# Patient Record
Sex: FEMALE | Race: ASIAN | NOT HISPANIC OR LATINO | URBAN - METROPOLITAN AREA
[De-identification: names, ages, dates, MRNs, and addresses within clinical notes are randomized per-mention and may not be internally consistent; named-entity substitution may affect disease eponyms.]

---

## 2021-06-26 ENCOUNTER — INPATIENT (INPATIENT)
Facility: HOSPITAL | Age: 51
LOS: 1 days | Discharge: HOME | DRG: 392 | End: 2021-06-28
Attending: HOSPITALIST | Admitting: HOSPITALIST
Payer: MEDICAID

## 2021-06-26 VITALS
HEART RATE: 80 BPM | SYSTOLIC BLOOD PRESSURE: 148 MMHG | WEIGHT: 154.1 LBS | TEMPERATURE: 98 F | RESPIRATION RATE: 16 BRPM | OXYGEN SATURATION: 100 % | DIASTOLIC BLOOD PRESSURE: 88 MMHG

## 2021-06-26 LAB
ALBUMIN SERPL ELPH-MCNC: 3.1 G/DL — LOW (ref 3.5–5)
ALP SERPL-CCNC: 78 U/L — SIGNIFICANT CHANGE UP (ref 40–120)
ALT FLD-CCNC: 20 U/L DA — SIGNIFICANT CHANGE UP (ref 10–60)
ANION GAP SERPL CALC-SCNC: 9 MMOL/L — SIGNIFICANT CHANGE UP (ref 5–17)
ANISOCYTOSIS BLD QL: SLIGHT — SIGNIFICANT CHANGE UP
APPEARANCE UR: CLEAR — SIGNIFICANT CHANGE UP
AST SERPL-CCNC: 12 U/L — SIGNIFICANT CHANGE UP (ref 10–40)
BACTERIA # UR AUTO: ABNORMAL /HPF
BASOPHILS # BLD AUTO: 0.07 K/UL — SIGNIFICANT CHANGE UP (ref 0–0.2)
BASOPHILS NFR BLD AUTO: 0.7 % — SIGNIFICANT CHANGE UP (ref 0–2)
BILIRUB SERPL-MCNC: 0.3 MG/DL — SIGNIFICANT CHANGE UP (ref 0.2–1.2)
BILIRUB UR-MCNC: NEGATIVE — SIGNIFICANT CHANGE UP
BUN SERPL-MCNC: 6 MG/DL — LOW (ref 7–18)
CALCIUM SERPL-MCNC: 8.9 MG/DL — SIGNIFICANT CHANGE UP (ref 8.4–10.5)
CHLORIDE SERPL-SCNC: 107 MMOL/L — SIGNIFICANT CHANGE UP (ref 96–108)
CO2 SERPL-SCNC: 25 MMOL/L — SIGNIFICANT CHANGE UP (ref 22–31)
COLOR SPEC: YELLOW — SIGNIFICANT CHANGE UP
CREAT SERPL-MCNC: 0.68 MG/DL — SIGNIFICANT CHANGE UP (ref 0.5–1.3)
D DIMER BLD IA.RAPID-MCNC: 201 NG/ML DDU — SIGNIFICANT CHANGE UP
DIFF PNL FLD: ABNORMAL
ELLIPTOCYTES BLD QL SMEAR: SLIGHT — SIGNIFICANT CHANGE UP
EOSINOPHIL # BLD AUTO: 0.37 K/UL — SIGNIFICANT CHANGE UP (ref 0–0.5)
EOSINOPHIL NFR BLD AUTO: 3.4 % — SIGNIFICANT CHANGE UP (ref 0–6)
EPI CELLS # UR: SIGNIFICANT CHANGE UP /HPF
GLUCOSE SERPL-MCNC: 106 MG/DL — HIGH (ref 70–99)
GLUCOSE UR QL: NEGATIVE — SIGNIFICANT CHANGE UP
HCT VFR BLD CALC: 30.2 % — LOW (ref 34.5–45)
HCT VFR BLD CALC: 31.6 % — LOW (ref 34.5–45)
HGB BLD-MCNC: 8.8 G/DL — LOW (ref 11.5–15.5)
HGB BLD-MCNC: 9.2 G/DL — LOW (ref 11.5–15.5)
HYPOCHROMIA BLD QL: SLIGHT — SIGNIFICANT CHANGE UP
IMM GRANULOCYTES NFR BLD AUTO: 0.5 % — SIGNIFICANT CHANGE UP (ref 0–1.5)
KETONES UR-MCNC: NEGATIVE — SIGNIFICANT CHANGE UP
LEUKOCYTE ESTERASE UR-ACNC: NEGATIVE — SIGNIFICANT CHANGE UP
LIDOCAIN IGE QN: 123 U/L — SIGNIFICANT CHANGE UP (ref 73–393)
LYMPHOCYTES # BLD AUTO: 28.2 % — SIGNIFICANT CHANGE UP (ref 13–44)
LYMPHOCYTES # BLD AUTO: 3.03 K/UL — SIGNIFICANT CHANGE UP (ref 1–3.3)
MAGNESIUM SERPL-MCNC: 2.5 MG/DL — SIGNIFICANT CHANGE UP (ref 1.6–2.6)
MANUAL SMEAR VERIFICATION: SIGNIFICANT CHANGE UP
MCHC RBC-ENTMCNC: 20.4 PG — LOW (ref 27–34)
MCHC RBC-ENTMCNC: 20.5 PG — LOW (ref 27–34)
MCHC RBC-ENTMCNC: 29.1 GM/DL — LOW (ref 32–36)
MCHC RBC-ENTMCNC: 29.1 GM/DL — LOW (ref 32–36)
MCV RBC AUTO: 70.2 FL — LOW (ref 80–100)
MCV RBC AUTO: 70.2 FL — LOW (ref 80–100)
MICROCYTES BLD QL: SLIGHT — SIGNIFICANT CHANGE UP
MONOCYTES # BLD AUTO: 1.01 K/UL — HIGH (ref 0–0.9)
MONOCYTES NFR BLD AUTO: 9.4 % — SIGNIFICANT CHANGE UP (ref 2–14)
NEUTROPHILS # BLD AUTO: 6.2 K/UL — SIGNIFICANT CHANGE UP (ref 1.8–7.4)
NEUTROPHILS NFR BLD AUTO: 57.8 % — SIGNIFICANT CHANGE UP (ref 43–77)
NITRITE UR-MCNC: NEGATIVE — SIGNIFICANT CHANGE UP
NRBC # BLD: 0 /100 WBCS — SIGNIFICANT CHANGE UP (ref 0–0)
NRBC # BLD: 0 /100 WBCS — SIGNIFICANT CHANGE UP (ref 0–0)
OB PNL STL: POSITIVE
OVALOCYTES BLD QL SMEAR: SLIGHT — SIGNIFICANT CHANGE UP
PH UR: 5 — SIGNIFICANT CHANGE UP (ref 5–8)
PLAT MORPH BLD: NORMAL — SIGNIFICANT CHANGE UP
PLATELET # BLD AUTO: 332 K/UL — SIGNIFICANT CHANGE UP (ref 150–400)
PLATELET # BLD AUTO: 362 K/UL — SIGNIFICANT CHANGE UP (ref 150–400)
POIKILOCYTOSIS BLD QL AUTO: SLIGHT — SIGNIFICANT CHANGE UP
POLYCHROMASIA BLD QL SMEAR: SLIGHT — SIGNIFICANT CHANGE UP
POTASSIUM SERPL-MCNC: 3.8 MMOL/L — SIGNIFICANT CHANGE UP (ref 3.5–5.3)
POTASSIUM SERPL-SCNC: 3.8 MMOL/L — SIGNIFICANT CHANGE UP (ref 3.5–5.3)
PROT SERPL-MCNC: 8.5 G/DL — HIGH (ref 6–8.3)
PROT UR-MCNC: NEGATIVE — SIGNIFICANT CHANGE UP
RBC # BLD: 4.3 M/UL — SIGNIFICANT CHANGE UP (ref 3.8–5.2)
RBC # BLD: 4.5 M/UL — SIGNIFICANT CHANGE UP (ref 3.8–5.2)
RBC # FLD: 17.4 % — HIGH (ref 10.3–14.5)
RBC # FLD: 17.4 % — HIGH (ref 10.3–14.5)
RBC BLD AUTO: ABNORMAL
RBC CASTS # UR COMP ASSIST: ABNORMAL /HPF (ref 0–2)
SODIUM SERPL-SCNC: 141 MMOL/L — SIGNIFICANT CHANGE UP (ref 135–145)
SP GR SPEC: 1.01 — SIGNIFICANT CHANGE UP (ref 1.01–1.02)
TROPONIN I SERPL-MCNC: <0.015 NG/ML — SIGNIFICANT CHANGE UP (ref 0–0.04)
UROBILINOGEN FLD QL: NEGATIVE — SIGNIFICANT CHANGE UP
WBC # BLD: 10.73 K/UL — HIGH (ref 3.8–10.5)
WBC # BLD: 9.67 K/UL — SIGNIFICANT CHANGE UP (ref 3.8–10.5)
WBC # FLD AUTO: 10.73 K/UL — HIGH (ref 3.8–10.5)
WBC # FLD AUTO: 9.67 K/UL — SIGNIFICANT CHANGE UP (ref 3.8–10.5)
WBC UR QL: SIGNIFICANT CHANGE UP /HPF (ref 0–5)

## 2021-06-26 PROCEDURE — 99285 EMERGENCY DEPT VISIT HI MDM: CPT

## 2021-06-26 PROCEDURE — 93010 ELECTROCARDIOGRAM REPORT: CPT

## 2021-06-26 PROCEDURE — 71045 X-RAY EXAM CHEST 1 VIEW: CPT | Mod: 26

## 2021-06-26 RX ORDER — KETOROLAC TROMETHAMINE 30 MG/ML
30 SYRINGE (ML) INJECTION ONCE
Refills: 0 | Status: DISCONTINUED | OUTPATIENT
Start: 2021-06-26 | End: 2021-06-26

## 2021-06-26 RX ORDER — IBUPROFEN 200 MG
1 TABLET ORAL
Qty: 21 | Refills: 0
Start: 2021-06-26 | End: 2021-07-02

## 2021-06-26 RX ORDER — FAMOTIDINE 10 MG/ML
20 INJECTION INTRAVENOUS ONCE
Refills: 0 | Status: COMPLETED | OUTPATIENT
Start: 2021-06-26 | End: 2021-06-26

## 2021-06-26 RX ORDER — FAMOTIDINE 10 MG/ML
1 INJECTION INTRAVENOUS
Qty: 60 | Refills: 0
Start: 2021-06-26 | End: 2021-07-25

## 2021-06-26 RX ORDER — ASPIRIN/CALCIUM CARB/MAGNESIUM 324 MG
162 TABLET ORAL ONCE
Refills: 0 | Status: COMPLETED | OUTPATIENT
Start: 2021-06-26 | End: 2021-06-26

## 2021-06-26 RX ADMIN — FAMOTIDINE 20 MILLIGRAM(S): 10 INJECTION INTRAVENOUS at 19:41

## 2021-06-26 RX ADMIN — Medication 30 MILLIGRAM(S): at 19:41

## 2021-06-26 RX ADMIN — Medication 162 MILLIGRAM(S): at 19:41

## 2021-06-26 RX ADMIN — Medication 30 MILLIGRAM(S): at 20:11

## 2021-06-26 NOTE — ED PROVIDER NOTE - PATIENT PORTAL LINK FT
You can access the FollowMyHealth Patient Portal offered by [facility] by registering at the following website: [email or web address]. By joining [employer]’s FollowMyHealth portal, you will also be able to view your health information using other applications (apps) compatible with our system.

## 2021-06-26 NOTE — ED PROVIDER NOTE - OBJECTIVE STATEMENT
50 y.o. female postmenopausal, c/o constant nonradiating MSCP tightness/burning since yest., pain worse with deep inspiration, dizziness, no sob, fever, coughing, palpitations, diaphoresis.  Pt completed COVID vaccine.  No FH of CAD.  Belching helps relief pain.  Pt had colonoscopy 3 yrs ago-neg. 50 y.o. female postmenopausal, c/o constant nonradiating MSCP tightness/burning since yest., pain worse with deep inspiration, dizziness, no sob, fever, coughing, palpitations, diaphoresis.  Pt completed COVID vaccine.  No FH of CAD.  Belching helps relief pain.  Pt had colonoscopy 3 yrs ago-?colitis, UC, pt doesn't know.

## 2021-06-26 NOTE — ED PROVIDER NOTE - CARE PLAN
Principal Discharge DX:	Atypical chest pain  Secondary Diagnosis:	GERD (gastroesophageal reflux disease)  Secondary Diagnosis:	Anemia   Principal Discharge DX:	Atypical chest pain  Secondary Diagnosis:	GERD (gastroesophageal reflux disease)  Secondary Diagnosis:	Anemia  Secondary Diagnosis:	GIB (gastrointestinal bleeding)   Principal Discharge DX:	GIB (gastrointestinal bleeding)  Secondary Diagnosis:	GERD (gastroesophageal reflux disease)  Secondary Diagnosis:	Anemia  Secondary Diagnosis:	Atypical chest pain

## 2021-06-26 NOTE — ED PROVIDER NOTE - PROGRESS NOTE DETAILS
pt in no distress, feeling much better, will d/c home.  Advised to f/u with PMD Pt with guaiac positive, case explained to pt's daughter, will repeat CBC, if stable, then GI consult next week. Hgb dropped to 8.8 from 9.2 in 4 hours, case d/w Dr. Art, will admit pt for GIB

## 2021-06-27 DIAGNOSIS — K92.2 GASTROINTESTINAL HEMORRHAGE, UNSPECIFIED: ICD-10-CM

## 2021-06-27 DIAGNOSIS — Z29.9 ENCOUNTER FOR PROPHYLACTIC MEASURES, UNSPECIFIED: ICD-10-CM

## 2021-06-27 DIAGNOSIS — J45.909 UNSPECIFIED ASTHMA, UNCOMPLICATED: ICD-10-CM

## 2021-06-27 DIAGNOSIS — R07.9 CHEST PAIN, UNSPECIFIED: ICD-10-CM

## 2021-06-27 LAB
ALBUMIN SERPL ELPH-MCNC: 3 G/DL — LOW (ref 3.5–5)
ALP SERPL-CCNC: 67 U/L — SIGNIFICANT CHANGE UP (ref 40–120)
ALT FLD-CCNC: 19 U/L DA — SIGNIFICANT CHANGE UP (ref 10–60)
ANION GAP SERPL CALC-SCNC: 10 MMOL/L — SIGNIFICANT CHANGE UP (ref 5–17)
AST SERPL-CCNC: 16 U/L — SIGNIFICANT CHANGE UP (ref 10–40)
BASOPHILS # BLD AUTO: 0.07 K/UL — SIGNIFICANT CHANGE UP (ref 0–0.2)
BASOPHILS NFR BLD AUTO: 0.7 % — SIGNIFICANT CHANGE UP (ref 0–2)
BILIRUB SERPL-MCNC: 0.4 MG/DL — SIGNIFICANT CHANGE UP (ref 0.2–1.2)
BUN SERPL-MCNC: 8 MG/DL — SIGNIFICANT CHANGE UP (ref 7–18)
CALCIUM SERPL-MCNC: 9.1 MG/DL — SIGNIFICANT CHANGE UP (ref 8.4–10.5)
CHLORIDE SERPL-SCNC: 108 MMOL/L — SIGNIFICANT CHANGE UP (ref 96–108)
CHOLEST SERPL-MCNC: 232 MG/DL — HIGH
CO2 SERPL-SCNC: 24 MMOL/L — SIGNIFICANT CHANGE UP (ref 22–31)
CREAT SERPL-MCNC: 0.64 MG/DL — SIGNIFICANT CHANGE UP (ref 0.5–1.3)
EOSINOPHIL # BLD AUTO: 0.29 K/UL — SIGNIFICANT CHANGE UP (ref 0–0.5)
EOSINOPHIL NFR BLD AUTO: 2.9 % — SIGNIFICANT CHANGE UP (ref 0–6)
FERRITIN SERPL-MCNC: 18 NG/ML — SIGNIFICANT CHANGE UP (ref 15–150)
FOLATE SERPL-MCNC: >20 NG/ML — SIGNIFICANT CHANGE UP
GLUCOSE SERPL-MCNC: 85 MG/DL — SIGNIFICANT CHANGE UP (ref 70–99)
HCG UR QL: NEGATIVE — SIGNIFICANT CHANGE UP
HCT VFR BLD CALC: 30.8 % — LOW (ref 34.5–45)
HDLC SERPL-MCNC: 85 MG/DL — SIGNIFICANT CHANGE UP
HGB BLD-MCNC: 8.9 G/DL — LOW (ref 11.5–15.5)
IMM GRANULOCYTES NFR BLD AUTO: 0.6 % — SIGNIFICANT CHANGE UP (ref 0–1.5)
IRON SATN MFR SERPL: 20 UG/DL — LOW (ref 40–150)
IRON SATN MFR SERPL: 6 % — LOW (ref 15–50)
LIPID PNL WITH DIRECT LDL SERPL: 137 MG/DL — HIGH
LYMPHOCYTES # BLD AUTO: 2.06 K/UL — SIGNIFICANT CHANGE UP (ref 1–3.3)
LYMPHOCYTES # BLD AUTO: 20.8 % — SIGNIFICANT CHANGE UP (ref 13–44)
MAGNESIUM SERPL-MCNC: 2.4 MG/DL — SIGNIFICANT CHANGE UP (ref 1.6–2.6)
MCHC RBC-ENTMCNC: 20.6 PG — LOW (ref 27–34)
MCHC RBC-ENTMCNC: 28.9 GM/DL — LOW (ref 32–36)
MCV RBC AUTO: 71.3 FL — LOW (ref 80–100)
MONOCYTES # BLD AUTO: 0.71 K/UL — SIGNIFICANT CHANGE UP (ref 0–0.9)
MONOCYTES NFR BLD AUTO: 7.2 % — SIGNIFICANT CHANGE UP (ref 2–14)
NEUTROPHILS # BLD AUTO: 6.73 K/UL — SIGNIFICANT CHANGE UP (ref 1.8–7.4)
NEUTROPHILS NFR BLD AUTO: 67.8 % — SIGNIFICANT CHANGE UP (ref 43–77)
NON HDL CHOLESTEROL: 147 MG/DL — HIGH
NRBC # BLD: 0 /100 WBCS — SIGNIFICANT CHANGE UP (ref 0–0)
PHOSPHATE SERPL-MCNC: 3.8 MG/DL — SIGNIFICANT CHANGE UP (ref 2.5–4.5)
PLATELET # BLD AUTO: 300 K/UL — SIGNIFICANT CHANGE UP (ref 150–400)
POTASSIUM SERPL-MCNC: 3.7 MMOL/L — SIGNIFICANT CHANGE UP (ref 3.5–5.3)
POTASSIUM SERPL-SCNC: 3.7 MMOL/L — SIGNIFICANT CHANGE UP (ref 3.5–5.3)
PROT SERPL-MCNC: 7.6 G/DL — SIGNIFICANT CHANGE UP (ref 6–8.3)
RBC # BLD: 4.32 M/UL — SIGNIFICANT CHANGE UP (ref 3.8–5.2)
RBC # BLD: 4.32 M/UL — SIGNIFICANT CHANGE UP (ref 3.8–5.2)
RBC # FLD: 17.6 % — HIGH (ref 10.3–14.5)
RETICS #: 65.2 K/UL — SIGNIFICANT CHANGE UP (ref 25–125)
RETICS/RBC NFR: 1.5 % — SIGNIFICANT CHANGE UP (ref 0.5–2.5)
SARS-COV-2 RNA SPEC QL NAA+PROBE: SIGNIFICANT CHANGE UP
SODIUM SERPL-SCNC: 142 MMOL/L — SIGNIFICANT CHANGE UP (ref 135–145)
TIBC SERPL-MCNC: 359 UG/DL — SIGNIFICANT CHANGE UP (ref 250–450)
TRIGL SERPL-MCNC: 50 MG/DL — SIGNIFICANT CHANGE UP
TROPONIN I SERPL-MCNC: <0.015 NG/ML — SIGNIFICANT CHANGE UP (ref 0–0.04)
TSH SERPL-MCNC: 1.47 UU/ML — SIGNIFICANT CHANGE UP (ref 0.34–4.82)
UIBC SERPL-MCNC: 339 UG/DL — SIGNIFICANT CHANGE UP (ref 110–370)
VIT B12 SERPL-MCNC: 291 PG/ML — SIGNIFICANT CHANGE UP (ref 232–1245)
WBC # BLD: 9.92 K/UL — SIGNIFICANT CHANGE UP (ref 3.8–10.5)
WBC # FLD AUTO: 9.92 K/UL — SIGNIFICANT CHANGE UP (ref 3.8–10.5)

## 2021-06-27 PROCEDURE — 99223 1ST HOSP IP/OBS HIGH 75: CPT

## 2021-06-27 PROCEDURE — 12345: CPT | Mod: NC

## 2021-06-27 PROCEDURE — 74177 CT ABD & PELVIS W/CONTRAST: CPT | Mod: 26

## 2021-06-27 RX ORDER — SODIUM CHLORIDE 9 MG/ML
1000 INJECTION INTRAMUSCULAR; INTRAVENOUS; SUBCUTANEOUS
Refills: 0 | Status: DISCONTINUED | OUTPATIENT
Start: 2021-06-27 | End: 2021-06-27

## 2021-06-27 RX ORDER — MONTELUKAST 4 MG/1
10 TABLET, CHEWABLE ORAL DAILY
Refills: 0 | Status: DISCONTINUED | OUTPATIENT
Start: 2021-06-27 | End: 2021-06-28

## 2021-06-27 RX ORDER — PANTOPRAZOLE SODIUM 20 MG/1
40 TABLET, DELAYED RELEASE ORAL EVERY 12 HOURS
Refills: 0 | Status: DISCONTINUED | OUTPATIENT
Start: 2021-06-27 | End: 2021-06-27

## 2021-06-27 RX ORDER — PANTOPRAZOLE SODIUM 20 MG/1
40 TABLET, DELAYED RELEASE ORAL
Refills: 0 | Status: DISCONTINUED | OUTPATIENT
Start: 2021-06-27 | End: 2021-06-28

## 2021-06-27 RX ORDER — ALBUTEROL 90 UG/1
2 AEROSOL, METERED ORAL EVERY 6 HOURS
Refills: 0 | Status: DISCONTINUED | OUTPATIENT
Start: 2021-06-27 | End: 2021-06-28

## 2021-06-27 RX ORDER — MONTELUKAST 4 MG/1
1 TABLET, CHEWABLE ORAL
Qty: 0 | Refills: 0 | DISCHARGE

## 2021-06-27 RX ORDER — IOHEXOL 300 MG/ML
30 INJECTION, SOLUTION INTRAVENOUS ONCE
Refills: 0 | Status: COMPLETED | OUTPATIENT
Start: 2021-06-27 | End: 2021-06-27

## 2021-06-27 RX ORDER — FLUTICASONE PROPIONATE AND SALMETEROL 50; 250 UG/1; UG/1
1 POWDER ORAL; RESPIRATORY (INHALATION)
Qty: 0 | Refills: 0 | DISCHARGE

## 2021-06-27 RX ORDER — SODIUM CHLORIDE 9 MG/ML
1000 INJECTION INTRAMUSCULAR; INTRAVENOUS; SUBCUTANEOUS
Refills: 0 | Status: DISCONTINUED | OUTPATIENT
Start: 2021-06-27 | End: 2021-06-28

## 2021-06-27 RX ORDER — ALBUTEROL 90 UG/1
2 AEROSOL, METERED ORAL
Qty: 0 | Refills: 0 | DISCHARGE

## 2021-06-27 RX ADMIN — SODIUM CHLORIDE 80 MILLILITER(S): 9 INJECTION INTRAMUSCULAR; INTRAVENOUS; SUBCUTANEOUS at 01:14

## 2021-06-27 RX ADMIN — MONTELUKAST 10 MILLIGRAM(S): 4 TABLET, CHEWABLE ORAL at 11:52

## 2021-06-27 RX ADMIN — IOHEXOL 30 MILLILITER(S): 300 INJECTION, SOLUTION INTRAVENOUS at 16:24

## 2021-06-27 RX ADMIN — PANTOPRAZOLE SODIUM 40 MILLIGRAM(S): 20 TABLET, DELAYED RELEASE ORAL at 06:06

## 2021-06-27 NOTE — CONSULT NOTE ADULT - NEGATIVE GASTROINTESTINAL SYMPTOMS
no nausea/no vomiting/no diarrhea/no abdominal pain/no melena/no hematochezia/no steatorrhea/no jaundice

## 2021-06-27 NOTE — CONSULT NOTE ADULT - NEGATIVE ENMT SYMPTOMS
no hearing difficulty/no vertigo/no sinus symptoms/no nose bleeds/no gum bleeding/no dry mouth/no throat pain/no dysphagia

## 2021-06-27 NOTE — H&P ADULT - PROBLEM SELECTOR PLAN 2
p/w hb 9.2>8.8  ? H/O colitis   FOBT positive  Vitals stable  Started on protonix 40 iv bid  f/u anemia panel  Monitor CBC daily   GI consult p/w hb 9.2>8.8  FOBT positive  Vitals stable  H/O Ulcerative Colitis diagnosed 15 yrs ago, last colonoscopy 3-4 yrs ago  Started on protonix 40 iv bid  f/u anemia panel  Monitor CBC daily   GI consult p/w hb 9.2>8.8  FOBT positive  Vitals stable  H/O Ulcerative Colitis diagnosed 15 yrs ago, last colonoscopy 3-4 yrs ago  Started on protonix 40 iv bid  f/u anemia panel  Monitor CBC daily   GI

## 2021-06-27 NOTE — CONSULT NOTE ADULT - ASSESSMENT
1. Anemia  2. Positive occult blood in stool  3. Ulcerative colitis  4. R/o peptic ulcer disease  5. R/o colorectal neoplasm    Suggestions:    1. Monitor H/H  2. Transfuse PRBC as needed  3. Protonix 40mg daily  4. Avoid NSAID  5. EGD and colonoscopy out patient  6. Diet as tolerated  7. DVT prophylaxis 1. Anemia  2. Positive occult blood in stool  3. Ulcerative colitis  4. R/o peptic ulcer disease  5. R/o colorectal neoplasm    Suggestions:    1. Monitor H/H  2. Transfuse PRBC as needed  3. Protonix 40mg daily  4. Avoid NSAID  5. EGD and colonoscopy out patient  6. Diet as tolerated  7. CT-Scan of abdomen and pelvis  8. DVT prophylaxis

## 2021-06-27 NOTE — H&P ADULT - ATTENDING COMMENTS
Patient is a 50 year old female with a PMH of ?Colitis who presented with a chief complaint of chest pain.  Patient states that beginning on the day of current presentation she began to experience left upper chest pain, radiating to her mid-chest and down to her upper abdomen, worsened with laying in certain positions and relieved with belching.    Of note, patient states that she has intermittently noticed her stools being blood-streaked.  Patient endorses that she followed-up with GI as an outpatient 3-4 years ago and was told she has "colitis".    At time of examination in the ED, patient denies any headache, dizziness, palpitations, shortness of breath, abdominal pain, nausea/vomiting/diarrhea.    T(C): 36.9 (06-26-21 @ 22:42), Max: 36.9 (06-26-21 @ 22:42)  T(F): 98.5 (06-26-21 @ 22:42), Max: 98.5 (06-26-21 @ 22:42)  HR: 75 (06-26-21 @ 22:42) (75 - 80)  BP: 140/80 (06-26-21 @ 22:42) (140/80 - 148/88)  RR: 18 (06-26-21 @ 22:42) (16 - 18)  SpO2: 99% (06-26-21 @ 22:42) (99% - 100%)  Wt(kg): --    P/E: As above MAR    A/P:    Atypical Chest Pain r/o ACS:  -HEART Score= 0, Low Score, Risk of MACE of 0.9-1.7%.  -FLORENCIO Score= 0 points, 5% risk at 14 days of: all-cause mortality, new or recurrent MI, or severe recurrent ischemia requiring urgent revascularization.  -Will admit to Telemetry for continuous cardiac monitoring  -Will continue to trend troponin with simultaneous acquisition of EKG  -TTE  -In light of possible AGIB, will HOLD Aspirin until patient is evaluated by GI    GI Bleeding:  -FOBT= Positive  -Hgb= 8.8 (Baseline= 9.2 on 6/26/2021)  -Will send Iron Panel (Iron, TIBC, Ferritin) and Retic Count as add-ons to already resulted CBC  -Will continue with IVF hydration  -Will send H.pylori stool antigen testing  -PPI BID (should ideally hold until testing for H.pylori sent)  -Will ask GI to evaluate patient for further recommendations such as possible EGD/Colonoscopy    Microcytic Anemia:  -As above    Uncontrolled Blood Glucose:  -Hemoglobin A1c with AM labs  -Blood Glucose Monitoring ACHS:  -Regular Insulin Sliding Scale ACHS  -NPO (except meds), for now until Speech and Swallow eval    Elevated Total Protein:  -Gamma Gap= 5.4 (Total Protein= 8.5, Albumin= 3.1)  -If clinically warranted, can consider testing for HIV, Blood Cell Dyscrasias, etc.    GI/DVT PPx:  -PPI BID (should ideally hold until testing for H.pylori sent)  -Intermittent Compression Stockings

## 2021-06-27 NOTE — H&P ADULT - ASSESSMENT
50 year old female with a PMH of ?Colitis and Asthma presents to ED with complaint of chest pain.        Pt is admitted for GI Bleed and Chest pain  50 year old female with a PMH of ?Ulcerative Colitis (diagnosed 15 yrs ago, last colonoscopy 3-4 yrs ago) and Asthma presents to ED with complaint of chest pain since 1 day.        Pt is admitted for GI Bleed and Chest pain

## 2021-06-27 NOTE — H&P ADULT - HISTORY OF PRESENT ILLNESS
50 year old female with a PMH of ?Colitis and Asthma presents to ED with complaint of chest pain. Pt reports left sided chest pain that began yesterday, radiates to mid chest, aggravates with laying down and gets relieved with belching. Pt states she has occasionally noticed blood streaked stools. Pt denies any cough, fever, palpitations, shortness of breath, N/V/D, abdominal pain, urinary symptoms or any other acute complaints.  50 year old female with a PMH of ?Ulcerative Colitis (diagnosed 15 yrs ago, last colonoscopy 3-4 yrs ago) and Asthma presents to ED with complaint of chest pain since 1 day. Pt reports left sided burning chest pain that began yesterday, radiates to mid chest, aggravates with laying down on the left side and gets relieved with belching. Pt took aspirin which help relieve the pain temporarily. Pt states she has occasionally noticed blood streaked stools. Pt was diagnosed with ulcerative colitis 15 yrs ago and had 3 colonoscopies with last colonoscopy 3-4 yrs ago.Pt denies any cough, fever, palpitations, shortness of breath, N/V/D, abdominal pain, urinary symptoms or any other acute complaints.

## 2021-06-27 NOTE — CONSULT NOTE ADULT - SUBJECTIVE AND OBJECTIVE BOX
[  ] STAT REQUEST              [  ]ROUTINE REQUEST    Patient is a 50 year old female with anemia and positive occult blood stool. GI consulted to evaluate.        HPI:  50 year old female with past medical history significant for Ulcerative Colitis and Asthma presents to ED with complaint of chest pain since 1 day. Pt reports left sided burning chest pain that began yesterday, radiates to mid chest, aggravates with laying down on the left side and gets relieved with belching. Pt took aspirin which help relieve the pain temporarily. Pt states she has occasionally noticed blood streaked stools. Pt was diagnosed with ulcerative colitis 15 yrs ago and had 3 colonoscopies with last colonoscopy 3-4 yrs ago. Pt denies abdominal pain, nausea, vomiting, hematemesis, melena, fever, chills, palpitations, shortness of breath, hematuria, dysuria or diarrhea.         PAIN MANAGEMENT:  Pain Scale:                0 /10  Pain Location:      Prior Colonoscopy:    PAST MEDICAL HISTORY  Asthma  Ulcerative colitis        PAST SURGICAL HISTORY  No significant past surgical history        Allergies    No Known Allergies    Intolerances  None         MEDICATIONS  (STANDING):  montelukast 10 milliGRAM(s) Oral daily  pantoprazole  Injectable 40 milliGRAM(s) IV Push every 12 hours  sodium chloride 0.9%. 1000 milliLiter(s) (80 mL/Hr) IV Continuous <Continuous>    MEDICATIONS  (PRN):  ALBUTerol    90 MICROgram(s) HFA Inhaler 2 Puff(s) Inhalation every 6 hours PRN Shortness of Breath and/or Wheezing      SOCIAL HISTORY  Advanced Directives:       [ X ] Full Code       [  ] DNR  Marital Status:         [  ] M      [ X ] S      [  ] D       [  ] W  Children:       [X  ] Yes      [  ] No  Occupation:        [  ] Employed       [ X ] Unemployed       [  ] Retired  Diet:       [ X ] Regular       [  ] PEG feeding          [  ] NG tube feeding  Drug Use:           [ X ] Patient denied          [  ] Yes  Alcohol:           [X  ] No             [  ] Yes (socially)         [  ] Yes (chronic)  Tobacco:           [  ] Yes           [ X ] No      FAMILY HISTORY  [ X ] Heart Disease            [ X ] Diabetes             [ X ] HTN             [  ] Colon Cancer             [  ] Stomach Cancer              [  ] Pancreatic Cancer      VITAL SIGNS   Vital Signs Last 24 Hrs  T(C): 36.3 (2021 04:55), Max: 36.9 (2021 22:42)  T(F): 97.4 (2021 04:55), Max: 98.5 (2021 22:42)  HR: 63 (2021 04:55) (61 - 80)  BP: 124/79 (2021 04:55) (124/79 - 148/88)   RR: 18 (2021 04:55) (16 - 18)  SpO2: 98% (2021 04:55) (98% - 100%)  Daily Weight in k.1 (2021 04:55)         CBC Full  -  ( 2021 07:54 )  WBC Count : 9.92 K/uL  RBC Count : 4.32 M/uL  Hemoglobin : 8.9 g/dL  Hematocrit : 30.8 %  Platelet Count - Automated : 300 K/uL  Mean Cell Volume : 71.3 fl  Mean Cell Hemoglobin : 20.6 pg  Mean Cell Hemoglobin Concentration : 28.9 gm/dL  Auto Neutrophil # : 6.73 K/uL  Auto Lymphocyte # : 2.06 K/uL  Auto Monocyte # : 0.71 K/uL  Auto Eosinophil # : 0.29 K/uL  Auto Basophil # : 0.07 K/uL  Auto Neutrophil % : 67.8 %  Auto Lymphocyte % : 20.8 %  Auto Monocyte % : 7.2 %  Auto Eosinophil % : 2.9 %  Auto Basophil % : 0.7 %          142  |  108  |  8   ----------------------------<  85  3.7   |  24  |  0.64    Ca    9.1      2021 07:54  Phos  3.8       Mg     2.4         TPro  7.6  /  Alb  3.0<L>  /  TBili  0.4  /  DBili  x   /  AST  16  /  ALT  19  /  AlkPhos  67      Lipase, Serum: 123 U/L ( @ 19:22)        Occult Blood, Feces (21 @ 21:57)   Occult Blood, Feces: Positive Iron with Total Binding Capacity (21 @ 07:54)   % Saturation, Iron: 6 %   Iron - Total Binding Capacity.: 359 ug/dL   Iron Total, Serum: 20 ug/dL   Unsaturated Iron Binding Capacity: 339 ug/dL     Urinalysis (21 @ 19:54)   Glucose Qualitative, Urine: Negative   Blood, Urine: Trace   pH Urine: 5.0   Color: Yellow   Urine Appearance: Clear   Bilirubin: Negative   Ketone - Urine: Negative   Specific Gravity: 1.010   Protein, Urine: Negative   Urobilinogen: Negative   Nitrite: Negative   Leukocyte Esterase Concentration: Negative     `    RADIOLOGY/IMAGING                EXAM:  XR CHEST PORTABLE URGENT 1V                            PROCEDURE DATE:  2021          INTERPRETATION:  Chest pain.    AP chest.    Heart and mediastinum normal.  Lungs clear.  Costophrenic angles sharp bilaterally.    IMPRESSION: Normal chest

## 2021-06-27 NOTE — H&P ADULT - PROBLEM SELECTOR PLAN 4
RISK                                                          Points  [  ] Previous VTE                                                3  [  ] Thrombophilia                                             2  [  ] Lower limb paralysis                                   2        (unable to hold up >15 seconds)    [  ] Current Cancer                                             2         (within 6 months)  [ x ] Immobilization > 24 hrs                              1  [  ] ICU/CCU stay > 24 hours                             1  [ ] Age > 60                                                         1    IMPROVE VTE Score: 1  will hold chemiprophylaxis due to GI bleed  scd boots

## 2021-06-27 NOTE — CHART NOTE - NSCHARTNOTEFT_GEN_A_CORE
BRIEF NOTE:      Admission H&P from this morning reviewed. Patient admitted for chest pain, which appears GERD like, and lower GI bleed. GI consulted, Dr. Mcwilliams, recommending oral PPI, CT a/p, and outpatient EGD/Colonscopy. CT a/p with IV and PO contrast ordered, will start on clear liquid diet today and advance as tolerated tomorrow. Hold aspirin, monitoring on tele, serial trops, and obtain echo.      Rest as per admission H&P

## 2021-06-27 NOTE — H&P ADULT - PROBLEM SELECTOR PLAN 1
p/w atypical left sided chest pain  Troponin X1 negative  EKG NSR  FLORENCIO Score= 0 points, 5% risk at 14 days of: all-cause mortality, new or recurrent MI, or severe recurrent ischemia requiring urgent revascularization.  Will hold aspirin due to GI bleed  f/u serial troponins p/w atypical left sided chest pain since 1 day that worsens with laying on left sode and improves with belching  Troponin X1 negative  EKG NSR  FLORENCIO Score= 0 points, 5% risk at 14 days of: all-cause mortality, new or recurrent MI, or severe recurrent ischemia requiring urgent revascularization.  Will hold aspirin due to GI bleed  f/u serial troponins

## 2021-06-28 VITALS
DIASTOLIC BLOOD PRESSURE: 70 MMHG | RESPIRATION RATE: 18 BRPM | TEMPERATURE: 98 F | HEART RATE: 77 BPM | OXYGEN SATURATION: 100 % | SYSTOLIC BLOOD PRESSURE: 115 MMHG

## 2021-06-28 LAB
ALBUMIN SERPL ELPH-MCNC: 2.8 G/DL — LOW (ref 3.5–5)
ALP SERPL-CCNC: 68 U/L — SIGNIFICANT CHANGE UP (ref 40–120)
ALT FLD-CCNC: 17 U/L DA — SIGNIFICANT CHANGE UP (ref 10–60)
ANION GAP SERPL CALC-SCNC: 6 MMOL/L — SIGNIFICANT CHANGE UP (ref 5–17)
AST SERPL-CCNC: 12 U/L — SIGNIFICANT CHANGE UP (ref 10–40)
BASOPHILS # BLD AUTO: 0.07 K/UL — SIGNIFICANT CHANGE UP (ref 0–0.2)
BASOPHILS NFR BLD AUTO: 0.9 % — SIGNIFICANT CHANGE UP (ref 0–2)
BILIRUB SERPL-MCNC: 0.4 MG/DL — SIGNIFICANT CHANGE UP (ref 0.2–1.2)
BUN SERPL-MCNC: 6 MG/DL — LOW (ref 7–18)
CALCIUM SERPL-MCNC: 9 MG/DL — SIGNIFICANT CHANGE UP (ref 8.4–10.5)
CHLORIDE SERPL-SCNC: 106 MMOL/L — SIGNIFICANT CHANGE UP (ref 96–108)
CO2 SERPL-SCNC: 27 MMOL/L — SIGNIFICANT CHANGE UP (ref 22–31)
COVID-19 SPIKE DOMAIN AB INTERP: POSITIVE
COVID-19 SPIKE DOMAIN ANTIBODY RESULT: >250 U/ML — HIGH
CREAT SERPL-MCNC: 0.6 MG/DL — SIGNIFICANT CHANGE UP (ref 0.5–1.3)
EOSINOPHIL # BLD AUTO: 0.38 K/UL — SIGNIFICANT CHANGE UP (ref 0–0.5)
EOSINOPHIL NFR BLD AUTO: 4.9 % — SIGNIFICANT CHANGE UP (ref 0–6)
GLUCOSE SERPL-MCNC: 74 MG/DL — SIGNIFICANT CHANGE UP (ref 70–99)
HCT VFR BLD CALC: 31.1 % — LOW (ref 34.5–45)
HGB BLD-MCNC: 9.1 G/DL — LOW (ref 11.5–15.5)
IMM GRANULOCYTES NFR BLD AUTO: 0.4 % — SIGNIFICANT CHANGE UP (ref 0–1.5)
LYMPHOCYTES # BLD AUTO: 2.2 K/UL — SIGNIFICANT CHANGE UP (ref 1–3.3)
LYMPHOCYTES # BLD AUTO: 28.5 % — SIGNIFICANT CHANGE UP (ref 13–44)
MAGNESIUM SERPL-MCNC: 2.3 MG/DL — SIGNIFICANT CHANGE UP (ref 1.6–2.6)
MCHC RBC-ENTMCNC: 20.5 PG — LOW (ref 27–34)
MCHC RBC-ENTMCNC: 29.3 GM/DL — LOW (ref 32–36)
MCV RBC AUTO: 70 FL — LOW (ref 80–100)
MONOCYTES # BLD AUTO: 0.62 K/UL — SIGNIFICANT CHANGE UP (ref 0–0.9)
MONOCYTES NFR BLD AUTO: 8 % — SIGNIFICANT CHANGE UP (ref 2–14)
NEUTROPHILS # BLD AUTO: 4.41 K/UL — SIGNIFICANT CHANGE UP (ref 1.8–7.4)
NEUTROPHILS NFR BLD AUTO: 57.3 % — SIGNIFICANT CHANGE UP (ref 43–77)
NRBC # BLD: 0 /100 WBCS — SIGNIFICANT CHANGE UP (ref 0–0)
PHOSPHATE SERPL-MCNC: 4.3 MG/DL — SIGNIFICANT CHANGE UP (ref 2.5–4.5)
PLATELET # BLD AUTO: 321 K/UL — SIGNIFICANT CHANGE UP (ref 150–400)
POTASSIUM SERPL-MCNC: 4.1 MMOL/L — SIGNIFICANT CHANGE UP (ref 3.5–5.3)
POTASSIUM SERPL-SCNC: 4.1 MMOL/L — SIGNIFICANT CHANGE UP (ref 3.5–5.3)
PROT SERPL-MCNC: 7.7 G/DL — SIGNIFICANT CHANGE UP (ref 6–8.3)
RBC # BLD: 4.44 M/UL — SIGNIFICANT CHANGE UP (ref 3.8–5.2)
RBC # FLD: 17.3 % — HIGH (ref 10.3–14.5)
SARS-COV-2 IGG+IGM SERPL QL IA: >250 U/ML — HIGH
SARS-COV-2 IGG+IGM SERPL QL IA: POSITIVE
SODIUM SERPL-SCNC: 139 MMOL/L — SIGNIFICANT CHANGE UP (ref 135–145)
WBC # BLD: 7.71 K/UL — SIGNIFICANT CHANGE UP (ref 3.8–10.5)
WBC # FLD AUTO: 7.71 K/UL — SIGNIFICANT CHANGE UP (ref 3.8–10.5)

## 2021-06-28 PROCEDURE — 85045 AUTOMATED RETICULOCYTE COUNT: CPT

## 2021-06-28 PROCEDURE — 96374 THER/PROPH/DIAG INJ IV PUSH: CPT

## 2021-06-28 PROCEDURE — 82272 OCCULT BLD FECES 1-3 TESTS: CPT

## 2021-06-28 PROCEDURE — 82728 ASSAY OF FERRITIN: CPT

## 2021-06-28 PROCEDURE — 96375 TX/PRO/DX INJ NEW DRUG ADDON: CPT

## 2021-06-28 PROCEDURE — 99285 EMERGENCY DEPT VISIT HI MDM: CPT

## 2021-06-28 PROCEDURE — 71045 X-RAY EXAM CHEST 1 VIEW: CPT

## 2021-06-28 PROCEDURE — 93306 TTE W/DOPPLER COMPLETE: CPT

## 2021-06-28 PROCEDURE — 87635 SARS-COV-2 COVID-19 AMP PRB: CPT

## 2021-06-28 PROCEDURE — 80061 LIPID PANEL: CPT

## 2021-06-28 PROCEDURE — 99239 HOSP IP/OBS DSCHRG MGMT >30: CPT | Mod: GC

## 2021-06-28 PROCEDURE — 83735 ASSAY OF MAGNESIUM: CPT

## 2021-06-28 PROCEDURE — 82607 VITAMIN B-12: CPT

## 2021-06-28 PROCEDURE — 85379 FIBRIN DEGRADATION QUANT: CPT

## 2021-06-28 PROCEDURE — 84443 ASSAY THYROID STIM HORMONE: CPT

## 2021-06-28 PROCEDURE — 86769 SARS-COV-2 COVID-19 ANTIBODY: CPT

## 2021-06-28 PROCEDURE — 83550 IRON BINDING TEST: CPT

## 2021-06-28 PROCEDURE — 74177 CT ABD & PELVIS W/CONTRAST: CPT

## 2021-06-28 PROCEDURE — 93005 ELECTROCARDIOGRAM TRACING: CPT

## 2021-06-28 PROCEDURE — 84484 ASSAY OF TROPONIN QUANT: CPT

## 2021-06-28 PROCEDURE — 82746 ASSAY OF FOLIC ACID SERUM: CPT

## 2021-06-28 PROCEDURE — 80053 COMPREHEN METABOLIC PANEL: CPT

## 2021-06-28 PROCEDURE — 81001 URINALYSIS AUTO W/SCOPE: CPT

## 2021-06-28 PROCEDURE — 85027 COMPLETE CBC AUTOMATED: CPT

## 2021-06-28 PROCEDURE — 83690 ASSAY OF LIPASE: CPT

## 2021-06-28 PROCEDURE — 84100 ASSAY OF PHOSPHORUS: CPT

## 2021-06-28 PROCEDURE — 36415 COLL VENOUS BLD VENIPUNCTURE: CPT

## 2021-06-28 PROCEDURE — 83540 ASSAY OF IRON: CPT

## 2021-06-28 PROCEDURE — 85025 COMPLETE CBC W/AUTO DIFF WBC: CPT

## 2021-06-28 PROCEDURE — 81025 URINE PREGNANCY TEST: CPT

## 2021-06-28 RX ORDER — ATORVASTATIN CALCIUM 80 MG/1
40 TABLET, FILM COATED ORAL AT BEDTIME
Refills: 0 | Status: DISCONTINUED | OUTPATIENT
Start: 2021-06-28 | End: 2021-06-28

## 2021-06-28 RX ORDER — PANTOPRAZOLE SODIUM 20 MG/1
1 TABLET, DELAYED RELEASE ORAL
Qty: 30 | Refills: 0
Start: 2021-06-28 | End: 2021-07-27

## 2021-06-28 RX ORDER — FERROUS SULFATE 325(65) MG
1 TABLET ORAL
Qty: 60 | Refills: 0
Start: 2021-06-28 | End: 2021-07-27

## 2021-06-28 RX ADMIN — MONTELUKAST 10 MILLIGRAM(S): 4 TABLET, CHEWABLE ORAL at 12:00

## 2021-06-28 RX ADMIN — PANTOPRAZOLE SODIUM 40 MILLIGRAM(S): 20 TABLET, DELAYED RELEASE ORAL at 06:52

## 2021-06-28 NOTE — PROGRESS NOTE ADULT - ASSESSMENT
1. Anemia  2. Positive occult blood in stool  3. Ulcerative colitis  4. R/o peptic ulcer disease  5. R/o colorectal neoplasm    Suggestions:    1. Monitor H/H  2. Transfuse PRBC as needed  3. Protonix 40mg daily  4. Avoid NSAID  5. EGD and colonoscopy out patient  6. Diet as tolerated  7. CT-Scan of abdomen and pelvis  8. DVT prophylaxis 1. Anemia  2. Positive occult blood in stool  3. Ulcerative colitis       Suggestions:    1. Monitor H/H  2. Transfuse PRBC as needed  3. Protonix 40mg daily  4. Antibiotics Cipro and flagyl  5. EGD and colonoscopy out patient  6. Diet as tolerated  7. Resume mesalamine therapy (Asacol HD 800mg TID)  8. DVT prophylaxis

## 2021-06-28 NOTE — DISCHARGE NOTE PROVIDER - NSDCMRMEDTOKEN_GEN_ALL_CORE_FT
ferrous sulfate 324 mg (65 mg elemental iron) oral delayed release tablet: 1 tab(s) orally 2 times a day   fluticasone-salmeterol 55 mcg-14 mcg/inh inhalation powder: 1 puff(s) inhaled 2 times a day  montelukast 10 mg oral tablet: 1 tab(s) orally once a day  pantoprazole 40 mg oral delayed release tablet: 1 tab(s) orally once a day (before a meal)

## 2021-06-28 NOTE — DISCHARGE NOTE PROVIDER - NSDCCPCAREPLAN_GEN_ALL_CORE_FT
PRINCIPAL DISCHARGE DIAGNOSIS  Diagnosis: Chest pain  Assessment and Plan of Treatment: You came in with chest pain. Your hcest pain was not cardiac in nature. You likely have gastroesophageal reflux which is causing your chest pain. You were started on protonix during your hospitalization. You chest pain has not since recurred. You will need to follow up with your PCP and Gastroenterologist after your dishcarge for further evaluation. You will need an ENDOSCOPY (done by a gastroenterologist).      SECONDARY DISCHARGE DIAGNOSES  Diagnosis: Anemia  Assessment and Plan of Treatment: You were found to have microcytic and hypochromic anemia consistent with IRON DEFICIENCY ANEMIA. You are treated with iron supplement (FERROUS SULFATE). Please take 1 tab twice daily. This medication may turn your stool a dark color. This may also cause some constipation. You make take stool softeners if you experience this side effect.    Diagnosis: Uterine fibroid  Assessment and Plan of Treatment: Uterine fibroids were found on your CT scan of the abdomen. Please follow up with an OB/GYN for further management and work up of this uterine fibroid. This may be contributing to your anemia as uterine fibroids may cause heavy menstrual bleeding.    Diagnosis: Hyperlipidemia  Assessment and Plan of Treatment: You were found to have high cholesterol levels. Please Maintain a healthy diet that consist of low sugar, low fat, low sodium. Decrease the amount of fried foods that you consume. Exercise frequently if possible. Please follow up with  your primary care provider within 1 week of your discharge for further management of your hyperlipidemia.   You are recommended a DASH Diet that emphasizes mostly vegetables, fruits, and fat-free or low-fat dairy products. Please limit intake of sodium, sweets, beverages w/ high sugar/carbohydrate content.       PRINCIPAL DISCHARGE DIAGNOSIS  Diagnosis: Chest pain  Assessment and Plan of Treatment: You came in with chest pain. Your hcest pain was not cardiac in nature. You likely have gastroesophageal reflux which is causing your chest pain. You were started on protonix during your hospitalization. You chest pain has not since recurred. You will need to follow up with your PCP and Gastroenterologist after your dishcarge for further evaluation. You will need an ENDOSCOPY (done by a gastroenterologist).      SECONDARY DISCHARGE DIAGNOSES  Diagnosis: Anemia  Assessment and Plan of Treatment: You were found to have microcytic and hypochromic anemia consistent with IRON DEFICIENCY ANEMIA. You are treated with iron supplement (FERROUS SULFATE). Please take 1 tab twice daily. This medication may turn your stool a dark color. This may also cause some constipation. You make take stool softeners if you experience this side effect.    Diagnosis: Uterine fibroid  Assessment and Plan of Treatment: Uterine fibroids were found on your CT scan of the abdomen. Please follow up with an OB/GYN for further management and work up of this uterine fibroid. This may be contributing to your anemia as uterine fibroids may cause heavy menstrual bleeding.    Diagnosis: Hyperlipidemia  Assessment and Plan of Treatment: You were found to have high cholesterol levels. Please Maintain a healthy diet that consist of low sugar, low fat, low sodium. Decrease the amount of fried foods that you consume. Exercise frequently if possible. Please follow up with  your primary care provider within 1 week of your discharge for further management of your hyperlipidemia.   You are recommended a DASH Diet that emphasizes mostly vegetables, fruits, and fat-free or low-fat dairy products. Please limit intake of sodium, sweets, beverages w/ high sugar/carbohydrate content.      Diagnosis: Ulcerative colitis  Assessment and Plan of Treatment: You have ulcerative colitis. You will need to follow up with your gastroenterologist within 2 weeks of your discharge. Please continue to take your ulcerative colitis medications as prescribed.

## 2021-06-28 NOTE — DISCHARGE NOTE PROVIDER - CARE PROVIDER_API CALL
JULIOCESAR FALCON  [id number]  [id number] [place] [address]  Phone: [phone]  Fax: [phone]  Follow Up Time: 1-3 days

## 2021-06-28 NOTE — DISCHARGE NOTE PROVIDER - ATTENDING COMMENTS
Patient seen and examined on day of discharge 6/28. Vitals, labs, and imaging reviewed. On exam, patient comfortable, lungs clears, cardiac wnl, abdomen soft and nontender. Hg is stable. Patient was admitted for epigastric pain, which appears secondary to GERD. Had concerns of GI bleed. GI consulted, Dr. Mcwilliams, recommending oral PPI, CT a/p which showed sigmoid colonic and rectal wall thickening without surrounding inflammation, Dr. Mcwilliams recommend outpatient EGD/Colonscopy. Diet was advanced as tolerated, tolerated well, epigastric pain resolved. Stable for discharge home, will hold aspirin on discharge and have patient f/up with GI as outpatient.    Discharge time spent: 33 minutes

## 2021-06-28 NOTE — DISCHARGE NOTE PROVIDER - HOSPITAL COURSE
50 year old female with a PMH of ?Ulcerative Colitis (diagnosed 15 yrs ago, last colonoscopy 3-4 yrs ago) and Asthma presents to ED with complaint of chest pain since 1 day.Pt is admitted for GI Bleed and Chest pain     Chest pain  Pt p/w atypical left sided chest pain since 1 day that worsens with laying on left sode and improves with belching. Troponin x2 neg. Echo shows normal ef, normal diastolic function. EKG NSR. Pt chest pain resolved after starting protonix. Pt to follow up with GI as outpatient for EGD + Colonoscopy.     Anemia, Iron Deficiency  Pt with Anemia Hb 8-9. Pt reports gross blood streaks. H/O Ulcerative Colitis diagnosed 15 yrs ago, last colonoscopy 3-4 yrs ago. Iron tabs Started on protonix 40 iv bid. Microcytic hypochromic anemia. Anemia panel shows low iron, normal TIBC. GI .    Hyperlipidemia  Elevated cholesterol, ldl. Low ASCVD risk, not started on statin as inpatient. Pt recommended diet changes. Pt to f/u with pcp day after discharge.    Pt diet advanced to regular prior to discharge. Pt able to ambulate independently. Patient is stable for discharge per attending and is advised to follow up with PCP as outpatient  Please refer to patient's complete medical chart with documents for a full hospital course, for this is only a brief summary.

## 2021-06-28 NOTE — DISCHARGE NOTE NURSING/CASE MANAGEMENT/SOCIAL WORK - PATIENT PORTAL LINK FT
You can access the FollowMyHealth Patient Portal offered by [facility] by registering at the following website: [email or web address]. By joining [employer]’s FollowMyHealth portal, you will also be able to view your health information using other applications (apps) compatible with our system.

## 2021-06-28 NOTE — PROGRESS NOTE ADULT - SUBJECTIVE AND OBJECTIVE BOX
[   ] ICU                                          [   ] CCU                                      [ X  ] Medical Floor    Patient is a 50 year old female with anemia and positive occult blood stool. GI consulted to evaluate.         50 year old female with past medical history significant for Ulcerative Colitis and Asthma presents to ED with complaint of chest pain since 1 day. Pt reports left sided burning chest pain that began yesterday, radiates to mid chest, aggravates with laying down on the left side and gets relieved with belching. Pt took aspirin which help relieve the pain temporarily. Pt states she has occasionally noticed blood streaked stools. Pt was diagnosed with ulcerative colitis 15 yrs ago and had 3 colonoscopies with last colonoscopy 3-4 yrs ago. Pt denies abdominal pain, nausea, vomiting, hematemesis, melena, fever, chills, palpitations, shortness of breath, hematuria, dysuria or diarrhea.       Today patient appears comfortable. No new complaints reported, No abdominal pain, N/V, hematemesis, hematochezia, melena, fever, chills, chest pain, SOB, cough or diarrhea reported.      PAIN MANAGEMENT:  Pain Scale:                0 /10  Pain Location:      Prior Colonoscopy:  Unknown    PAST MEDICAL HISTORY  Asthma  Ulcerative colitis        PAST SURGICAL HISTORY  No significant past surgical history        Allergies    No Known Allergies    Intolerances  None         MEDICATIONS  (STANDING):  montelukast 10 milliGRAM(s) Oral daily  pantoprazole  Injectable 40 milliGRAM(s) IV Push every 12 hours  sodium chloride 0.9%. 1000 milliLiter(s) (80 mL/Hr) IV Continuous <Continuous>    MEDICATIONS  (PRN):  ALBUTerol    90 MICROgram(s) HFA Inhaler 2 Puff(s) Inhalation every 6 hours PRN Shortness of Breath and/or Wheezing      SOCIAL HISTORY  Advanced Directives:       [ X ] Full Code       [  ] DNR  Marital Status:         [  ] M      [ X ] S      [  ] D       [  ] W  Children:       [X  ] Yes      [  ] No  Occupation:        [  ] Employed       [ X ] Unemployed       [  ] Retired  Diet:       [ X ] Regular       [  ] PEG feeding          [  ] NG tube feeding  Drug Use:           [ X ] Patient denied          [  ] Yes  Alcohol:           [X  ] No             [  ] Yes (socially)         [  ] Yes (chronic)  Tobacco:           [  ] Yes           [ X ] No      FAMILY HISTORY  [ X ] Heart Disease            [ X ] Diabetes             [ X ] HTN             [  ] Colon Cancer             [  ] Stomach Cancer              [  ] Pancreatic Cancer        VITALS  Vital Signs Last 24 Hrs  T(C): 36.8 (28 Jun 2021 05:12), Max: 36.8 (28 Jun 2021 05:12)  T(F): 98.2 (28 Jun 2021 05:12), Max: 98.2 (28 Jun 2021 05:12)  HR: 68 (28 Jun 2021 05:12) (60 - 68)  BP: 127/76 (28 Jun 2021 05:12) (116/74 - 132/79)   RR: 18 (28 Jun 2021 05:12) (18 - 18)  SpO2: 98% (28 Jun 2021 05:12) (98% - 99%)       MEDICATIONS  (STANDING):  montelukast 10 milliGRAM(s) Oral daily  pantoprazole    Tablet 40 milliGRAM(s) Oral before breakfast  sodium chloride 0.9%. 1000 milliLiter(s) (80 mL/Hr) IV Continuous <Continuous>    MEDICATIONS  (PRN):  ALBUTerol    90 MICROgram(s) HFA Inhaler 2 Puff(s) Inhalation every 6 hours PRN Shortness of Breath and/or Wheezing                            9.1    7.71  )-----------( 321      ( 28 Jun 2021 06:37 )             31.1       06-28    139  |  106  |  6<L>  ----------------------------<  74  4.1   |  27  |  0.60    Ca    9.0      28 Jun 2021 06:37  Phos  4.3     06-28  Mg     2.3     06-28    TPro  7.7  /  Alb  2.8<L>  /  TBili  0.4  /  DBili  x   /  AST  12  /  ALT  17  /  AlkPhos  68  06-28        EXAM:  CT ABDOMEN AND PELVIS OC                             PROCEDURE DATE:  06/27/2021          INTERPRETATION:  CLINICAL INFORMATION: Gastrointestinal hemorrhage. History of ulcerative colitis.    COMPARISON: None.    CONTRAST/COMPLICATIONS:  IV Contrast: Omnipaque 350  90 cc administered   10 cc discarded  Oral Contrast: Omnipaque 300  Complications: None reported at time of study completion    PROCEDURE:  CT of the Abdomen and Pelvis was performed.  Sagittal and coronal reformats were performed.    FINDINGS:  LOWER CHEST: Within normal limits.    LIVER: Within normal limits.  BILE DUCTS: Normal caliber.  GALLBLADDER: Within normal limits.  SPLEEN: Within normal limits.  PANCREAS: Within normal limits.  ADRENALS: Within normal limits.  KIDNEYS/URETERS: Within normal limits.    BLADDER: Within normal limits.  REPRODUCTIVE ORGANS: Enlarged mildly lobulated appearing uterus, which may reflect underlying fibroids.    BOWEL: No bowel obstruction. Oral contrast material reaches the distal transverse colon. Presence of oral contrast material precludes evaluation for intraluminal extravasation of intravenous contrast. Mild sigmoid colonic and rectal wall thickening without significant pericolonic inflammation. Appendix is normal.  PERITONEUM: No ascites.  VESSELS: Within normal limits.  RETROPERITONEUM/LYMPH NODES: No lymphadenopathy.  ABDOMINAL WALL: Within normal limits.  BONES: Sclerotic focus within the left femoral head, may represent a bone island.    IMPRESSION:  Sigmoid colonic and rectal wall thickening without surrounding inflammation. Findings may represent sequelae of patient's known inflammatory bowel disease.

## 2022-03-16 NOTE — ED ADULT NURSE NOTE - DOES PATIENT HAVE ADVANCE DIRECTIVE
Detail Level: Generalized Quality 226: Preventive Care And Screening: Tobacco Use: Screening And Cessation Intervention: Patient screened for tobacco use and is an ex/non-smoker No

## 2024-12-17 ENCOUNTER — INPATIENT (INPATIENT)
Facility: HOSPITAL | Age: 54
LOS: 6 days | Discharge: HOME | DRG: 812 | End: 2024-12-24
Attending: INTERNAL MEDICINE | Admitting: INTERNAL MEDICINE
Payer: COMMERCIAL

## 2024-12-17 VITALS
WEIGHT: 141.76 LBS | SYSTOLIC BLOOD PRESSURE: 122 MMHG | OXYGEN SATURATION: 99 % | DIASTOLIC BLOOD PRESSURE: 75 MMHG | TEMPERATURE: 99 F | HEART RATE: 94 BPM | HEIGHT: 65 IN | RESPIRATION RATE: 18 BRPM

## 2024-12-17 DIAGNOSIS — D64.9 ANEMIA, UNSPECIFIED: ICD-10-CM

## 2024-12-17 DIAGNOSIS — E87.6 HYPOKALEMIA: ICD-10-CM

## 2024-12-17 DIAGNOSIS — J10.1 INFLUENZA DUE TO OTHER IDENTIFIED INFLUENZA VIRUS WITH OTHER RESPIRATORY MANIFESTATIONS: ICD-10-CM

## 2024-12-17 DIAGNOSIS — R18.8 OTHER ASCITES: ICD-10-CM

## 2024-12-17 DIAGNOSIS — J45.909 UNSPECIFIED ASTHMA, UNCOMPLICATED: ICD-10-CM

## 2024-12-17 DIAGNOSIS — K63.9 DISEASE OF INTESTINE, UNSPECIFIED: ICD-10-CM

## 2024-12-17 DIAGNOSIS — N94.89 OTHER SPECIFIED CONDITIONS ASSOCIATED WITH FEMALE GENITAL ORGANS AND MENSTRUAL CYCLE: ICD-10-CM

## 2024-12-17 DIAGNOSIS — D75.838 OTHER THROMBOCYTOSIS: ICD-10-CM

## 2024-12-17 DIAGNOSIS — Z29.9 ENCOUNTER FOR PROPHYLACTIC MEASURES, UNSPECIFIED: ICD-10-CM

## 2024-12-17 DIAGNOSIS — D75.839 THROMBOCYTOSIS, UNSPECIFIED: ICD-10-CM

## 2024-12-17 DIAGNOSIS — K51.90 ULCERATIVE COLITIS, UNSPECIFIED, WITHOUT COMPLICATIONS: ICD-10-CM

## 2024-12-17 LAB
ALBUMIN SERPL ELPH-MCNC: 3.1 G/DL — LOW (ref 3.5–5)
ALP SERPL-CCNC: 66 U/L — SIGNIFICANT CHANGE UP (ref 40–120)
ALT FLD-CCNC: 12 U/L DA — SIGNIFICANT CHANGE UP (ref 10–60)
ANION GAP SERPL CALC-SCNC: 7 MMOL/L — SIGNIFICANT CHANGE UP (ref 5–17)
APTT BLD: 26.6 SEC — SIGNIFICANT CHANGE UP (ref 24.5–35.6)
AST SERPL-CCNC: 6 U/L — LOW (ref 10–40)
BASOPHILS # BLD AUTO: 0.05 K/UL — SIGNIFICANT CHANGE UP (ref 0–0.2)
BASOPHILS NFR BLD AUTO: 0.5 % — SIGNIFICANT CHANGE UP (ref 0–2)
BILIRUB SERPL-MCNC: 0.3 MG/DL — SIGNIFICANT CHANGE UP (ref 0.2–1.2)
BUN SERPL-MCNC: 6 MG/DL — LOW (ref 7–18)
CALCIUM SERPL-MCNC: 8.9 MG/DL — SIGNIFICANT CHANGE UP (ref 8.4–10.5)
CHLORIDE SERPL-SCNC: 104 MMOL/L — SIGNIFICANT CHANGE UP (ref 96–108)
CO2 SERPL-SCNC: 26 MMOL/L — SIGNIFICANT CHANGE UP (ref 22–31)
CREAT SERPL-MCNC: 0.56 MG/DL — SIGNIFICANT CHANGE UP (ref 0.5–1.3)
EGFR: 108 ML/MIN/1.73M2 — SIGNIFICANT CHANGE UP
EOSINOPHIL # BLD AUTO: 0 K/UL — SIGNIFICANT CHANGE UP (ref 0–0.5)
EOSINOPHIL NFR BLD AUTO: 0 % — SIGNIFICANT CHANGE UP (ref 0–6)
FLUAV AG NPH QL: SIGNIFICANT CHANGE UP
FLUBV AG NPH QL: DETECTED
GLUCOSE SERPL-MCNC: 102 MG/DL — HIGH (ref 70–99)
HCT VFR BLD CALC: 19.5 % — CRITICAL LOW (ref 34.5–45)
HGB BLD-MCNC: 5.3 G/DL — CRITICAL LOW (ref 11.5–15.5)
IMM GRANULOCYTES NFR BLD AUTO: 0.8 % — SIGNIFICANT CHANGE UP (ref 0–0.9)
INR BLD: 1.09 RATIO — SIGNIFICANT CHANGE UP (ref 0.85–1.16)
LIDOCAIN IGE QN: 20 U/L — SIGNIFICANT CHANGE UP (ref 13–75)
LYMPHOCYTES # BLD AUTO: 1.54 K/UL — SIGNIFICANT CHANGE UP (ref 1–3.3)
LYMPHOCYTES # BLD AUTO: 14.1 % — SIGNIFICANT CHANGE UP (ref 13–44)
MAGNESIUM SERPL-MCNC: 2 MG/DL — SIGNIFICANT CHANGE UP (ref 1.6–2.6)
MCHC RBC-ENTMCNC: 16.4 PG — LOW (ref 27–34)
MCHC RBC-ENTMCNC: 27.2 G/DL — LOW (ref 32–36)
MCV RBC AUTO: 60.2 FL — LOW (ref 80–100)
MONOCYTES # BLD AUTO: 1.41 K/UL — HIGH (ref 0–0.9)
MONOCYTES NFR BLD AUTO: 12.9 % — SIGNIFICANT CHANGE UP (ref 2–14)
NEUTROPHILS # BLD AUTO: 7.81 K/UL — HIGH (ref 1.8–7.4)
NEUTROPHILS NFR BLD AUTO: 71.7 % — SIGNIFICANT CHANGE UP (ref 43–77)
NRBC # BLD: 0 /100 WBCS — SIGNIFICANT CHANGE UP (ref 0–0)
PLATELET # BLD AUTO: 416 K/UL — HIGH (ref 150–400)
POTASSIUM SERPL-MCNC: 3.2 MMOL/L — LOW (ref 3.5–5.3)
POTASSIUM SERPL-SCNC: 3.2 MMOL/L — LOW (ref 3.5–5.3)
PROT SERPL-MCNC: 7.3 G/DL — SIGNIFICANT CHANGE UP (ref 6–8.3)
PROTHROM AB SERPL-ACNC: 12.6 SEC — SIGNIFICANT CHANGE UP (ref 9.9–13.4)
RBC # BLD: 3.24 M/UL — LOW (ref 3.8–5.2)
RBC # FLD: 17.6 % — HIGH (ref 10.3–14.5)
RSV RNA NPH QL NAA+NON-PROBE: SIGNIFICANT CHANGE UP
SARS-COV-2 RNA SPEC QL NAA+PROBE: SIGNIFICANT CHANGE UP
SODIUM SERPL-SCNC: 137 MMOL/L — SIGNIFICANT CHANGE UP (ref 135–145)
WBC # BLD: 10.9 K/UL — HIGH (ref 3.8–10.5)
WBC # FLD AUTO: 10.9 K/UL — HIGH (ref 3.8–10.5)

## 2024-12-17 PROCEDURE — 99285 EMERGENCY DEPT VISIT HI MDM: CPT

## 2024-12-17 PROCEDURE — 74177 CT ABD & PELVIS W/CONTRAST: CPT | Mod: 26,MC

## 2024-12-17 RX ORDER — IPRATROPIUM BROMIDE AND ALBUTEROL SULFATE .5; 2.5 MG/3ML; MG/3ML
3 SOLUTION RESPIRATORY (INHALATION) EVERY 6 HOURS
Refills: 0 | Status: DISCONTINUED | OUTPATIENT
Start: 2024-12-17 | End: 2024-12-24

## 2024-12-17 RX ORDER — ONDANSETRON 4 MG/1
4 TABLET ORAL ONCE
Refills: 0 | Status: COMPLETED | OUTPATIENT
Start: 2024-12-17 | End: 2024-12-17

## 2024-12-17 RX ORDER — SODIUM CHLORIDE 9 MG/ML
1000 INJECTION, SOLUTION INTRAMUSCULAR; INTRAVENOUS; SUBCUTANEOUS ONCE
Refills: 0 | Status: COMPLETED | OUTPATIENT
Start: 2024-12-17 | End: 2024-12-17

## 2024-12-17 RX ORDER — ONDANSETRON 4 MG/1
4 TABLET ORAL EVERY 8 HOURS
Refills: 0 | Status: DISCONTINUED | OUTPATIENT
Start: 2024-12-17 | End: 2024-12-17

## 2024-12-17 RX ORDER — KETOROLAC TROMETHAMINE 30 MG/ML
15 INJECTION INTRAMUSCULAR; INTRAVENOUS ONCE
Refills: 0 | Status: DISCONTINUED | OUTPATIENT
Start: 2024-12-17 | End: 2024-12-17

## 2024-12-17 RX ORDER — PANTOPRAZOLE 40 MG/1
40 TABLET, DELAYED RELEASE ORAL
Refills: 0 | Status: DISCONTINUED | OUTPATIENT
Start: 2024-12-17 | End: 2024-12-24

## 2024-12-17 RX ORDER — FLUTICASONE FUROATE AND VILANTEROL TRIFENATATE 200; 25 UG/1; UG/1
1 POWDER RESPIRATORY (INHALATION)
Refills: 0 | DISCHARGE

## 2024-12-17 RX ORDER — POTASSIUM CHLORIDE 600 MG/1
40 TABLET, FILM COATED, EXTENDED RELEASE ORAL
Refills: 0 | Status: COMPLETED | OUTPATIENT
Start: 2024-12-17 | End: 2024-12-17

## 2024-12-17 RX ORDER — ACETAMINOPHEN 80 MG/.8ML
650 SOLUTION/ DROPS ORAL EVERY 6 HOURS
Refills: 0 | Status: DISCONTINUED | OUTPATIENT
Start: 2024-12-17 | End: 2024-12-24

## 2024-12-17 RX ORDER — ONDANSETRON 4 MG/1
4 TABLET ORAL EVERY 8 HOURS
Refills: 0 | Status: DISCONTINUED | OUTPATIENT
Start: 2024-12-17 | End: 2024-12-24

## 2024-12-17 RX ORDER — ENOXAPARIN SODIUM 60 MG/.6ML
40 INJECTION INTRAVENOUS; SUBCUTANEOUS EVERY 24 HOURS
Refills: 0 | Status: DISCONTINUED | OUTPATIENT
Start: 2024-12-17 | End: 2024-12-22

## 2024-12-17 RX ORDER — OSELTAMIVIR 75 MG/1
75 CAPSULE ORAL
Refills: 0 | Status: COMPLETED | OUTPATIENT
Start: 2024-12-17 | End: 2024-12-22

## 2024-12-17 RX ADMIN — POTASSIUM CHLORIDE 40 MILLIEQUIVALENT(S): 600 TABLET, FILM COATED, EXTENDED RELEASE ORAL at 18:39

## 2024-12-17 RX ADMIN — KETOROLAC TROMETHAMINE 15 MILLIGRAM(S): 30 INJECTION INTRAMUSCULAR; INTRAVENOUS at 20:58

## 2024-12-17 RX ADMIN — SODIUM CHLORIDE 1000 MILLILITER(S): 9 INJECTION, SOLUTION INTRAMUSCULAR; INTRAVENOUS; SUBCUTANEOUS at 13:22

## 2024-12-17 RX ADMIN — IPRATROPIUM BROMIDE AND ALBUTEROL SULFATE 3 MILLILITER(S): .5; 2.5 SOLUTION RESPIRATORY (INHALATION) at 22:59

## 2024-12-17 RX ADMIN — ONDANSETRON 4 MILLIGRAM(S): 4 TABLET ORAL at 13:22

## 2024-12-17 RX ADMIN — POTASSIUM CHLORIDE 40 MILLIEQUIVALENT(S): 600 TABLET, FILM COATED, EXTENDED RELEASE ORAL at 21:42

## 2024-12-17 RX ADMIN — KETOROLAC TROMETHAMINE 15 MILLIGRAM(S): 30 INJECTION INTRAMUSCULAR; INTRAVENOUS at 13:22

## 2024-12-17 RX ADMIN — ACETAMINOPHEN 650 MILLIGRAM(S): 80 SOLUTION/ DROPS ORAL at 20:02

## 2024-12-17 NOTE — H&P ADULT - PROBLEM SELECTOR PLAN 1
p/w headache, dizziness  likely iso bloody stool x 2 weeks  Hb 5.3 on admission  transfused 1U pRBC in ED  - will transfuse another unit pRBC  - f/u post-transfusion CBC  - f/u iron studies   - maintain active T&S, 2 large IV bore cannula

## 2024-12-17 NOTE — ED PROVIDER NOTE - CARDIAC, MLM
SOCIAL WORK NOTE:/Event Note Normal rate, regular rhythm.  Heart sounds S1, S2.  No murmurs, rubs or gallops.

## 2024-12-17 NOTE — H&P ADULT - NSICDXFAMHXNEG_GEN_ALL
asthma/atrial fibrillation/congestive heart failure/COPD/diabetes/heart disease/hypertension/irritable bowel syndrome/stroke

## 2024-12-17 NOTE — ED PROVIDER NOTE - OBJECTIVE STATEMENT
54-year-old female past medical history of colitis presents ED with abdominal pain, nausea, vomiting, and dizziness.  Son at bedside to give collateral information.  As per patient she has had the vomiting and diarrhea for the last 1 week.  Patient last vomited yesterday and last had diarrhea yesterday.  Patient reports 5-6 episodes of diarrhea yesterday with no blood noted in the vomiting or diarrhea.  Patient went to go see her PMD today and was vies to come to the ER for further evaluation.  Patient saying she is not feeling headache and dizziness associated with her symptoms.  No fever, no cough, no chest pain, no shortness of breath 54-year-old female past medical history of colitis presents ED with abdominal pain, nausea, vomiting, and dizziness.  Son at bedside to give collateral information.  As per patient she has had the vomiting and diarrhea for the last 1 week.  Patient last vomited yesterday and last had diarrhea yesterday.  Patient reports 5-6 episodes of diarrhea yesterday with no blood noted in the vomiting but with blood in stool.  Patient went to go see her PMD today and was vies to come to the ER for further evaluation.  Patient saying she is not feeling headache and dizziness associated with her symptoms.  No fever, no cough, no chest pain, no shortness of breath

## 2024-12-17 NOTE — ED ADULT TRIAGE NOTE - SOURCE OF INFORMATION
We are committed to providing you with the best care possible. In order to help us achieve these goals please remember to bring all medications, herbal products, and over the counter supplements with you to each visit. If your provider has ordered testing for you, please be sure to follow up with our office if you have not received results within 7 days after the testing took place. *If you receive a survey after visiting one of our offices, please take time to share your experience concerning your physician office visit. These surveys are confidential and no health information about you is shared. We are eager to improve for you and we are counting on your feedback to help make that happen.
Patient

## 2024-12-17 NOTE — H&P ADULT - NSHPPHYSICALEXAM_GEN_ALL_CORE
GENERAL: NAD, well-groomed, well-developed, lying in bed comfortably  HEAD:  Atraumatic, normocephalic  EYES: Conjunctiva and sclera clear  ENT: Moist mucous membranes  NECK: Supple, normal appearance  CHEST/LUNG: Clear to auscultation bilaterally; no rales, rhonchi, wheezing, or rubs. No respiratory distress, no use of accessory muscles  HEART: Regular rate and rhythm; no murmurs, rubs, or gallops  ABDOMEN: Soft, nontender, nondistended; no rebound, no guarding, no palpable masses; bowel sounds present  GENITOURINARY: No suprapubic tenderness  EXTREMITIES: 2+ peripheral pulses, brisk capillary refill; no clubbing, cyanosis, or edema  NERVOUS SYSTEM:  Alert & Oriented X3, speech clear, no deficits  MSK: FROM all 4 extremities, full and equal 5/5 strength  LYMPH: No lymphadenopathy noted  SKIN: No rashes or lesions    Vital Signs Last 24 Hrs  T(C): 36.7 (17 Dec 2024 18:05), Max: 37.2 (17 Dec 2024 16:29)  T(F): 98 (17 Dec 2024 18:05), Max: 99 (17 Dec 2024 16:29)  HR: 73 (17 Dec 2024 18:05) (73 - 94)  BP: 127/68 (17 Dec 2024 18:05) (119/71 - 127/68)  BP(mean): --  RR: 18 (17 Dec 2024 16:29) (18 - 18)  SpO2: 97% (17 Dec 2024 16:29) (97% - 99%)    Parameters below as of 17 Dec 2024 11:45  Patient On (Oxygen Delivery Method): room air

## 2024-12-17 NOTE — H&P ADULT - NSHPREVIEWOFSYSTEMS_GEN_ALL_CORE
REVIEW OF SYSTEMS:  CONSTITUTIONAL: Weakness. Denies fevers or chills  HEENT: Headache, dizziness. Denies visual changes, vertigo, throat pain   RESPIRATORY: Denies cough, wheezing, denies shortness of breath  CARDIOVASCULAR: Denies chest pain or palpitations  GASTROINTESTINAL: Lower abdominal pain, nausea, vomiting, diarrhea, bloody stool. Denies constipation.  GENITOURINARY: Denies dysuria, frequency or hematuria  NEUROLOGICAL: Denies numbness or weakness  SKIN: Denies itching, rashes

## 2024-12-17 NOTE — H&P ADULT - PROBLEM SELECTOR PLAN 2
p/w lower abdominal pain x 2 weeks, nausea and vomiting  CTAP: 7.0 x 2.6 x 3.2 cm cystic R adnexal lesion, possibly ovarian or tubular malignancy  ascites and omental carcinomatosis consistent with metastatic disease  - consult heme/onc  - c/w zofran prn

## 2024-12-17 NOTE — H&P ADULT - PROBLEM SELECTOR PLAN 3
hx of UC, diagnosed in 2006  colonoscopy in 2022, unremarkable  bloody stool x 2 weeks  CTAP: irregular thickening of sigmoid colon also concerning for malignancy  - consult GI

## 2024-12-17 NOTE — ED PROVIDER NOTE - PROGRESS NOTE DETAILS
Hemoglobin of 5.3 noted.  Patient does report seeing blood in her stool.  Patient consented for transfusion and packed red cells ordered.  Patient also found to be flu positive.  CT scan with Ascites and omental carcinomatosis consistent with metastatic disease.  All results reviewed with patient.  Patient to be admitted

## 2024-12-17 NOTE — PATIENT PROFILE ADULT - FALL HARM RISK - RISK INTERVENTIONS

## 2024-12-17 NOTE — ED PROVIDER NOTE - CLINICAL SUMMARY MEDICAL DECISION MAKING FREE TEXT BOX
54-year-old female history of colitis presents ED with abdominal pain associated nausea and vomiting.  Nausea patient with headache and dizziness likely related to dehydration.  Afebrile here in ED tender to lower abdomen.  Concern for possible colitis for diverticulitis.  Will get labs, CAT scan, fluids, UA, reassess

## 2024-12-17 NOTE — PATIENT PROFILE ADULT - NSPROEXTENSIONSOFSELF_GEN_A_NUR
Fall Risk Factors  Recent falls and Weakness    Fall Prevention Strategies   Bed in lowest position, Call light within reach, Non-slip footwear, Risk for fall identifier placed and Side rails up x 2    [facility] Fall Risk Score  11 (11/28/17 2057)   none

## 2024-12-17 NOTE — H&P ADULT - ASSESSMENT
54 F, from home, PMHx asthma and UC [diagnosed in 2006] presented to the ED for abdominal pain x 2 weeks, headache, nausea, and vomiting for 1 week and dizziness for 1 day. Hb 5.3 Admitted to medicine for symptomatic anemia and malignancy work-up.     Ascites and omental carcinomatosis consistent with metastatic disease.    7.0 x 2.6 x 3.2 cm cystic right adnexal lesion, possibly ovarian or   tubular malignancy. Recommend pelvic MRI.    Irregular thickening of the sigmoid colon also concerning for malignancy.   Recommend colonoscopy. 54 F, from home, PMHx asthma and UC [diagnosed in 2006] presented to the ED for abdominal pain x 2 weeks, headache, nausea, and vomiting for 1 week and dizziness for 1 day. Hb 5.3 Admitted to medicine for symptomatic anemia and malignancy work-up.      54 F, from home, PMHx asthma and UC [diagnosed in 2006] presented to the ED for abdominal pain x 2 weeks, headache, nausea, and vomiting for 1 week and dizziness for 1 day. Hb 5.3 Admitted to medicine for symptomatic anemia and malignancy work-up.

## 2024-12-17 NOTE — H&P ADULT - HISTORY OF PRESENT ILLNESS
54 F, from home, PMHx asthma and UC [diagnosed in 2006] presented to the ED for abdominal pain x 2 weeks, headache, nausea, and vomiting for 1 week and dizziness for 1 day. Endorses non-radiating, intermittent sharp lower abdominal pain. Also complained on a generalized headache, and nausea and nonbloody, nonbilious vomiting with decreased PO intake. Patient noted to have bloody stool for past 2 weeks and diarrhea that started yesterday. Patient states she feels very weak. Most recent colonoscopy was in 2022, which was unremarkable. Patient saw PCP Dr. Jenae Wiggins recently who recommended a repeat colonoscopy which was scheduled for next month. Denies fever, chills, CP. SOB, palpitations, constipation or urinary Sx.

## 2024-12-17 NOTE — H&P ADULT - ATTENDING COMMENTS
54 F, from home, PMHx asthma and UC [diagnosed in 2006] presented to the ED for abdominal pain x 2 weeks, headache, nausea, and vomiting for 1 week and dizziness for 1 day. Endorses non-radiating, intermittent sharp lower abdominal pain. Also complained on a generalized headache, and nausea and nonbloody, nonbilious vomiting with decreased PO intake. Patient noted to have bloody stool for past 2 weeks and diarrhea that started yesterday. Patient states she feels very weak. Most recent colonoscopy was in 2022, which was unremarkable. Patient saw PCP Dr. Jenae Wiggins recently who recommended a repeat colonoscopy which was scheduled for next month. Denies fever, chills, CP. SOB, palpitations, constipation or urinary Sx.         < from: CT Abdomen and Pelvis w/ IV Cont (12.17.24 @ 15:14) >    IMPRESSION:  Ascites and omental carcinomatosis consistent with metastatic disease.    7.0 x 2.6 x 3.2 cm cystic right adnexal lesion, possibly ovarian or   tubular malignancy. Recommend pelvic MRI.    Irregular thickening of the sigmoid colon also concerning for malignancy.   Recommend colonoscopy.    < end of copied text >          assessment  --  symptomatic anemia, omental carcinomatosis likely metastatic disease, r/o colon ca, adnexal mass r/o ovarian or tubular malig, hypokalemia, influenza b, h/o  asthma and UC [diagnosed in 2006]     plan  --  admit to med, transfuse 2 units prbc, supplement potassium, tamiflu x 5 days, cont preadmit home meds, gi and dvt prophylaxis  cbc, bmp, mg, phos, lipids, tsh, ca 125, ca19-9, cea, iron studies      mri abd pelv     gyn conc  gi cons  surg cons

## 2024-12-18 LAB
ALBUMIN SERPL ELPH-MCNC: 3 G/DL — LOW (ref 3.5–5)
ALP SERPL-CCNC: 66 U/L — SIGNIFICANT CHANGE UP (ref 40–120)
ALT FLD-CCNC: 11 U/L DA — SIGNIFICANT CHANGE UP (ref 10–60)
ANION GAP SERPL CALC-SCNC: 4 MMOL/L — LOW (ref 5–17)
APPEARANCE UR: CLEAR — SIGNIFICANT CHANGE UP
AST SERPL-CCNC: 9 U/L — LOW (ref 10–40)
BILIRUB SERPL-MCNC: 0.3 MG/DL — SIGNIFICANT CHANGE UP (ref 0.2–1.2)
BILIRUB UR-MCNC: NEGATIVE — SIGNIFICANT CHANGE UP
BUN SERPL-MCNC: 6 MG/DL — LOW (ref 7–18)
CALCIUM SERPL-MCNC: 8.9 MG/DL — SIGNIFICANT CHANGE UP (ref 8.4–10.5)
CANCER AG125 SERPL-ACNC: 165 U/ML — HIGH
CANCER AG19-9 SERPL-ACNC: 527 U/ML — HIGH
CEA SERPL-MCNC: 14 NG/ML — HIGH (ref 0–3.8)
CHLORIDE SERPL-SCNC: 107 MMOL/L — SIGNIFICANT CHANGE UP (ref 96–108)
CO2 SERPL-SCNC: 26 MMOL/L — SIGNIFICANT CHANGE UP (ref 22–31)
COLOR SPEC: YELLOW — SIGNIFICANT CHANGE UP
CREAT SERPL-MCNC: 0.57 MG/DL — SIGNIFICANT CHANGE UP (ref 0.5–1.3)
DIFF PNL FLD: NEGATIVE — SIGNIFICANT CHANGE UP
EGFR: 108 ML/MIN/1.73M2 — SIGNIFICANT CHANGE UP
FERRITIN SERPL-MCNC: 8 NG/ML — LOW (ref 13–330)
GLUCOSE SERPL-MCNC: 104 MG/DL — HIGH (ref 70–99)
GLUCOSE UR QL: NEGATIVE MG/DL — SIGNIFICANT CHANGE UP
HCT VFR BLD CALC: 28.7 % — LOW (ref 34.5–45)
HGB BLD-MCNC: 8.7 G/DL — LOW (ref 11.5–15.5)
IRON SATN MFR SERPL: 15 UG/DL — LOW (ref 40–150)
IRON SATN MFR SERPL: 4 % — LOW (ref 15–50)
KETONES UR-MCNC: NEGATIVE MG/DL — SIGNIFICANT CHANGE UP
LEUKOCYTE ESTERASE UR-ACNC: NEGATIVE — SIGNIFICANT CHANGE UP
MAGNESIUM SERPL-MCNC: 2.1 MG/DL — SIGNIFICANT CHANGE UP (ref 1.6–2.6)
MCHC RBC-ENTMCNC: 20.1 PG — LOW (ref 27–34)
MCHC RBC-ENTMCNC: 30.3 G/DL — LOW (ref 32–36)
MCV RBC AUTO: 66.4 FL — LOW (ref 80–100)
NITRITE UR-MCNC: NEGATIVE — SIGNIFICANT CHANGE UP
NRBC # BLD: 0 /100 WBCS — SIGNIFICANT CHANGE UP (ref 0–0)
PH UR: 7.5 — SIGNIFICANT CHANGE UP (ref 5–8)
PHOSPHATE SERPL-MCNC: 4 MG/DL — SIGNIFICANT CHANGE UP (ref 2.5–4.5)
PLATELET # BLD AUTO: 376 K/UL — SIGNIFICANT CHANGE UP (ref 150–400)
POTASSIUM SERPL-MCNC: 4.2 MMOL/L — SIGNIFICANT CHANGE UP (ref 3.5–5.3)
POTASSIUM SERPL-SCNC: 4.2 MMOL/L — SIGNIFICANT CHANGE UP (ref 3.5–5.3)
PROT SERPL-MCNC: 7.5 G/DL — SIGNIFICANT CHANGE UP (ref 6–8.3)
PROT UR-MCNC: NEGATIVE MG/DL — SIGNIFICANT CHANGE UP
RBC # BLD: 4.32 M/UL — SIGNIFICANT CHANGE UP (ref 3.8–5.2)
RBC # FLD: 24.9 % — HIGH (ref 10.3–14.5)
SODIUM SERPL-SCNC: 137 MMOL/L — SIGNIFICANT CHANGE UP (ref 135–145)
SP GR SPEC: 1.01 — SIGNIFICANT CHANGE UP (ref 1–1.03)
TIBC SERPL-MCNC: 344 UG/DL — SIGNIFICANT CHANGE UP (ref 250–450)
UIBC SERPL-MCNC: 329 UG/DL — SIGNIFICANT CHANGE UP (ref 110–370)
UROBILINOGEN FLD QL: 0.2 MG/DL — SIGNIFICANT CHANGE UP (ref 0.2–1)
WBC # BLD: 9 K/UL — SIGNIFICANT CHANGE UP (ref 3.8–10.5)
WBC # FLD AUTO: 9 K/UL — SIGNIFICANT CHANGE UP (ref 3.8–10.5)

## 2024-12-18 RX ORDER — ACETAMINOPHEN 80 MG/.8ML
650 SOLUTION/ DROPS ORAL ONCE
Refills: 0 | Status: COMPLETED | OUTPATIENT
Start: 2024-12-18 | End: 2024-12-18

## 2024-12-18 RX ORDER — METHYLPREDNISOLONE 4 MG/1
60 TABLET ORAL EVERY 6 HOURS
Refills: 0 | Status: DISCONTINUED | OUTPATIENT
Start: 2024-12-18 | End: 2024-12-21

## 2024-12-18 RX ORDER — GUAIFENESIN 100 MG/5ML
100 SYRUP ORAL EVERY 6 HOURS
Refills: 0 | Status: DISCONTINUED | OUTPATIENT
Start: 2024-12-18 | End: 2024-12-24

## 2024-12-18 RX ORDER — MONTELUKAST SODIUM 10 MG/1
10 TABLET, FILM COATED ORAL DAILY
Refills: 0 | Status: DISCONTINUED | OUTPATIENT
Start: 2024-12-18 | End: 2024-12-24

## 2024-12-18 RX ADMIN — ACETAMINOPHEN 650 MILLIGRAM(S): 80 SOLUTION/ DROPS ORAL at 07:51

## 2024-12-18 RX ADMIN — IPRATROPIUM BROMIDE AND ALBUTEROL SULFATE 3 MILLILITER(S): .5; 2.5 SOLUTION RESPIRATORY (INHALATION) at 02:42

## 2024-12-18 RX ADMIN — IPRATROPIUM BROMIDE AND ALBUTEROL SULFATE 3 MILLILITER(S): .5; 2.5 SOLUTION RESPIRATORY (INHALATION) at 08:17

## 2024-12-18 RX ADMIN — Medication 100 MILLIGRAM(S): at 08:50

## 2024-12-18 RX ADMIN — METHYLPREDNISOLONE 60 MILLIGRAM(S): 4 TABLET ORAL at 23:35

## 2024-12-18 RX ADMIN — MONTELUKAST SODIUM 10 MILLIGRAM(S): 10 TABLET, FILM COATED ORAL at 18:11

## 2024-12-18 RX ADMIN — ACETAMINOPHEN 650 MILLIGRAM(S): 80 SOLUTION/ DROPS ORAL at 08:51

## 2024-12-18 RX ADMIN — IPRATROPIUM BROMIDE AND ALBUTEROL SULFATE 3 MILLILITER(S): .5; 2.5 SOLUTION RESPIRATORY (INHALATION) at 20:10

## 2024-12-18 RX ADMIN — IPRATROPIUM BROMIDE AND ALBUTEROL SULFATE 3 MILLILITER(S): .5; 2.5 SOLUTION RESPIRATORY (INHALATION) at 15:57

## 2024-12-18 RX ADMIN — ONDANSETRON 4 MILLIGRAM(S): 4 TABLET ORAL at 07:51

## 2024-12-18 RX ADMIN — OSELTAMIVIR 75 MILLIGRAM(S): 75 CAPSULE ORAL at 05:59

## 2024-12-18 RX ADMIN — PANTOPRAZOLE 40 MILLIGRAM(S): 40 TABLET, DELAYED RELEASE ORAL at 05:59

## 2024-12-18 RX ADMIN — ACETAMINOPHEN 650 MILLIGRAM(S): 80 SOLUTION/ DROPS ORAL at 14:16

## 2024-12-18 RX ADMIN — OSELTAMIVIR 75 MILLIGRAM(S): 75 CAPSULE ORAL at 18:08

## 2024-12-18 RX ADMIN — Medication 100 MILLIGRAM(S): at 18:07

## 2024-12-18 RX ADMIN — ACETAMINOPHEN 650 MILLIGRAM(S): 80 SOLUTION/ DROPS ORAL at 18:08

## 2024-12-18 RX ADMIN — METHYLPREDNISOLONE 60 MILLIGRAM(S): 4 TABLET ORAL at 18:07

## 2024-12-18 NOTE — POST DISCHARGE NOTE - NOTIFICATION:
Notified Dr. Danish Davison of patient's CT findings.  Please contact [email or web address] or [phone] or select “Cancer Care Direct” on the discharge disposition sheet when the patient is close to discharge for assistance in outpatient care.

## 2024-12-18 NOTE — PROGRESS NOTE ADULT - ASSESSMENT
54 year old, F, from home, w/ PMH Asthma and UC [diagnosed in 2006].  Presented to the ED for abdominal pain x 2 weeks, headache, nausea, and vomiting x 1 week and dizziness x 1 day. Endorses non-radiating, intermittent sharp lower abdominal pain. Also complained on a generalized headache, and nausea and nonbloody, nonbilious vomiting with decreased PO intake. Patient noted to have bloody stool for past  2 weeks and diarrhea that started yesterday. Patient states she feels very weak. Most recent colonoscopy was in 2022, which was unremarkable. Patient saw PCP Dr. Jenae Wiggins recently who recommended a repeat colonoscopy which was scheduled for next month.    Admitted to medicine for Symptomatic anemia and malignancy molly     54 year old, F, from home, w/ PMH Asthma and UC [diagnosed in 2006].  Presented to the ED for abdominal pain x 2 weeks, headache, nausea, and vomiting x 1 week and dizziness x 1 day. Endorses non-radiating, intermittent sharp lower abdominal pain. Also complained on a generalized headache, and nausea and nonbloody, nonbilious vomiting with decreased PO intake. Patient noted to have bloody stool for past  2 weeks and diarrhea that started yesterday. Patient states she feels very weak. Most recent colonoscopy was in 2022, which was unremarkable. Patient saw PCP Dr. Jenae Wiggins recently who recommended a repeat colonoscopy which was scheduled for next month.    Admitted for Symptomatic anemia and malignancy molly

## 2024-12-18 NOTE — PROGRESS NOTE ADULT - SUBJECTIVE AND OBJECTIVE BOX
NP Note discussed with  primary attending    Patient is a 54y old  Female who presents with a chief complaint of Abdominal pain (18 Dec 2024 11:27)      INTERVAL HPI/OVERNIGHT EVENTS: Pt seen w/  at bedside.  Denies HA, SOB, CP or worsened abdominal pain.  Denies BM at this time.  Agreeable to MR.       MEDICATIONS  (STANDING):  albuterol/ipratropium for Nebulization 3 milliLiter(s) Nebulizer every 6 hours  enoxaparin Injectable 40 milliGRAM(s) SubCutaneous every 24 hours  oseltamivir 75 milliGRAM(s) Oral two times a day  pantoprazole    Tablet 40 milliGRAM(s) Oral before breakfast    MEDICATIONS  (PRN):  acetaminophen     Tablet .. 650 milliGRAM(s) Oral every 6 hours PRN Temp greater or equal to 38C (100.4F), Mild Pain (1 - 3)  guaiFENesin Oral Liquid (Sugar-Free) 100 milliGRAM(s) Oral every 6 hours PRN Cough  ondansetron Injectable 4 milliGRAM(s) IV Push every 8 hours PRN Nausea and/or Vomiting      __________________________________________________  REVIEW OF SYSTEMS:    CONSTITUTIONAL: No fever  EYES: No acute visual disturbances  NECK: No pain or stiffness  RESPIRATORY: No cough; No shortness of breath  CARDIOVASCULAR: No chest pain, no palpitations  GASTROINTESTINAL: (+) Abdominal pain. No nausea or vomiting.  No diarrhea   NEUROLOGICAL: No headache or numbness, no tremors  MUSCULOSKELETAL: No joint pain, no muscle pain  GENITOURINARY: No dysuria, no frequency, no hesitancy  PSYCHIATRY: No depression, no anxiety  ALL OTHER  ROS negative        Vital Signs Last 24 Hrs  T(C): 38.1 (18 Dec 2024 13:42), Max: 38.1 (18 Dec 2024 13:42)  T(F): 100.5 (18 Dec 2024 13:42), Max: 100.5 (18 Dec 2024 13:42)  HR: 88 (18 Dec 2024 13:42) (73 - 88)  BP: 122/74 (18 Dec 2024 13:42) (119/71 - 136/60)  BP(mean): 97 (18 Dec 2024 04:20) (86 - 97)  RR: 17 (18 Dec 2024 13:42) (17 - 18)  SpO2: 96% (18 Dec 2024 13:42) (96% - 99%)    Parameters below as of 18 Dec 2024 13:42  Patient On (Oxygen Delivery Method): room air        ________________________________________________  PHYSICAL EXAM:  GENERAL: NAD  HEENT: Normocephalic;  conjunctivae and sclerae clear; moist mucous membranes   NECK : Supple  CHEST/LUNG: Clear to auscultation bilaterally with good air entry   HEART: S1 S2  regular; no murmurs, gallops or rubs  ABDOMEN: Soft, Mild tenderness on palpation, Mild distention; Bowel sounds present x 4 quad.  No rebound or guarding noted.    EXTREMITIES: No cyanosis; no edema; no calf tenderness  SKIN: Warm and dry; no rash  NERVOUS SYSTEM:  Awake and alert; Oriented to place, person and time; no new deficits    _________________________________________________  LABS:                        8.7    9.00  )-----------( 376      ( 18 Dec 2024 05:18 )             28.7     12-18    137  |  107  |  6[L]  ----------------------------<  104[H]  4.2   |  26  |  0.57    Ca    8.9      18 Dec 2024 05:18  Phos  4.0     12-18  Mg     2.1     12-18    TPro  7.5  /  Alb  3.0[L]  /  TBili  0.3  /  DBili  x   /  AST  9[L]  /  ALT  11  /  AlkPhos  66  12-18    PT/INR - ( 17 Dec 2024 14:43 )   PT: 12.6 sec;   INR: 1.09 ratio         PTT - ( 17 Dec 2024 14:43 )  PTT:26.6 sec  Urinalysis Basic - ( 18 Dec 2024 07:57 )    Color: Yellow / Appearance: Clear / S.012 / pH: x  Gluc: x / Ketone: Negative mg/dL  / Bili: Negative / Urobili: 0.2 mg/dL   Blood: x / Protein: Negative mg/dL / Nitrite: Negative   Leuk Esterase: Negative / RBC: x / WBC x   Sq Epi: x / Non Sq Epi: x / Bacteria: x      CAPILLARY BLOOD GLUCOSE            RADIOLOGY & ADDITIONAL TESTS:    Imaging Personally Reviewed:  YES/NO    Consultant(s) Notes Reviewed:   YES/ No    Care Discussed with Consultants :     Plan of care was discussed with patient and /or primary care giver; all questions and concerns were addressed and care was aligned with patient's wishes.     NP Note discussed with  primary attending    Patient is a 54y old  Female who presents with a chief complaint of Abdominal pain (18 Dec 2024 11:27)      INTERVAL HPI/OVERNIGHT EVENTS: Pt seen w/  at bedside.  Denies HA, SOB, CP or worsened abdominal pain.  Reported intermittent cough, non productive.  Denies BM at this time.  Agreeable to MR.       MEDICATIONS  (STANDING):  albuterol/ipratropium for Nebulization 3 milliLiter(s) Nebulizer every 6 hours  enoxaparin Injectable 40 milliGRAM(s) SubCutaneous every 24 hours  oseltamivir 75 milliGRAM(s) Oral two times a day  pantoprazole    Tablet 40 milliGRAM(s) Oral before breakfast    MEDICATIONS  (PRN):  acetaminophen     Tablet .. 650 milliGRAM(s) Oral every 6 hours PRN Temp greater or equal to 38C (100.4F), Mild Pain (1 - 3)  guaiFENesin Oral Liquid (Sugar-Free) 100 milliGRAM(s) Oral every 6 hours PRN Cough  ondansetron Injectable 4 milliGRAM(s) IV Push every 8 hours PRN Nausea and/or Vomiting      __________________________________________________  REVIEW OF SYSTEMS:    CONSTITUTIONAL: No fever  EYES: No acute visual disturbances  NECK: No pain or stiffness  RESPIRATORY: (+) Cough; No shortness of breath  CARDIOVASCULAR: No chest pain, no palpitations  GASTROINTESTINAL: (+) Abdominal pain. No nausea or vomiting.  No diarrhea   NEUROLOGICAL: No headache or numbness, no tremors  MUSCULOSKELETAL: No joint pain, no muscle pain  GENITOURINARY: No dysuria, no frequency, no hesitancy  PSYCHIATRY: No depression, no anxiety  ALL OTHER  ROS negative        Vital Signs Last 24 Hrs  T(C): 38.1 (18 Dec 2024 13:42), Max: 38.1 (18 Dec 2024 13:42)  T(F): 100.5 (18 Dec 2024 13:42), Max: 100.5 (18 Dec 2024 13:42)  HR: 88 (18 Dec 2024 13:42) (73 - 88)  BP: 122/74 (18 Dec 2024 13:42) (119/71 - 136/60)  BP(mean): 97 (18 Dec 2024 04:20) (86 - 97)  RR: 17 (18 Dec 2024 13:42) (17 - 18)  SpO2: 96% (18 Dec 2024 13:42) (96% - 99%)    Parameters below as of 18 Dec 2024 13:42  Patient On (Oxygen Delivery Method): room air        ________________________________________________  PHYSICAL EXAM:  GENERAL: NAD  HEENT: Normocephalic;  conjunctivae and sclerae clear; moist mucous membranes   NECK : Supple  CHEST/LUNG: Clear to auscultation bilaterally with good air entry   HEART: S1 S2  regular; no murmurs, gallops or rubs  ABDOMEN: Soft, Mild tenderness on palpation, Mild distention; Bowel sounds present x 4 quad.  No rebound or guarding noted.    EXTREMITIES: No cyanosis; no edema; no calf tenderness  SKIN: Warm and dry; no rash  NERVOUS SYSTEM:  Awake and alert; Oriented to place, person and time; no new deficits    _________________________________________________  LABS:                        8.7    9.00  )-----------( 376      ( 18 Dec 2024 05:18 )             28.7     12-18    137  |  107  |  6[L]  ----------------------------<  104[H]  4.2   |  26  |  0.57    Ca    8.9      18 Dec 2024 05:18  Phos  4.0     12-18  Mg     2.1     12-18    TPro  7.5  /  Alb  3.0[L]  /  TBili  0.3  /  DBili  x   /  AST  9[L]  /  ALT  11  /  AlkPhos  66  12-18    PT/INR - ( 17 Dec 2024 14:43 )   PT: 12.6 sec;   INR: 1.09 ratio         PTT - ( 17 Dec 2024 14:43 )  PTT:26.6 sec  Urinalysis Basic - ( 18 Dec 2024 07:57 )    Color: Yellow / Appearance: Clear / S.012 / pH: x  Gluc: x / Ketone: Negative mg/dL  / Bili: Negative / Urobili: 0.2 mg/dL   Blood: x / Protein: Negative mg/dL / Nitrite: Negative   Leuk Esterase: Negative / RBC: x / WBC x   Sq Epi: x / Non Sq Epi: x / Bacteria: x      CAPILLARY BLOOD GLUCOSE            RADIOLOGY & ADDITIONAL TESTS:    Imaging Personally Reviewed:  YES/NO    Consultant(s) Notes Reviewed:   YES/ No    Care Discussed with Consultants :     Plan of care was discussed with patient and /or primary care giver; all questions and concerns were addressed and care was aligned with patient's wishes.

## 2024-12-18 NOTE — PROGRESS NOTE ADULT - SUBJECTIVE AND OBJECTIVE BOX
Patient is a 54y old  Female who presents with a chief complaint of   pt seen in icu [  ], reg med floor [   ], bed [  ], chair at bedside [   ], a+o x3 [  ], lethargic [  ],  nad [  ]    valentine [  ], ngt [  ], peg [  ], et tube [  ], cent line [  ], picc line [  ]        Allergies    No Known Allergies        Vitals    T(F): 98.9 (12-18-24 @ 04:20), Max: 99.6 (12-17-24 @ 20:45)  HR: 84 (12-18-24 @ 04:20) (73 - 94)  BP: 131/80 (12-18-24 @ 04:20) (119/71 - 136/60)  RR: 18 (12-18-24 @ 04:20) (18 - 18)  SpO2: 98% (12-18-24 @ 04:20) (97% - 99%)  Wt(kg): --  CAPILLARY BLOOD GLUCOSE          Labs                          8.7    9.00  )-----------( 376      ( 18 Dec 2024 05:18 )             28.7       12-18    x   |  x   |  x   ----------------------------<  x   x    |  x   |  0.57    Ca    8.9      17 Dec 2024 13:24  Phos  4.0     12-18  Mg     2.1     12-18    TPro  7.5  /  Alb  x   /  TBili  0.3  /  DBili  x   /  AST  9[L]  /  ALT  11  /  AlkPhos  66  12-18                Radiology Results      Meds    MEDICATIONS  (STANDING):  albuterol/ipratropium for Nebulization 3 milliLiter(s) Nebulizer every 6 hours  enoxaparin Injectable 40 milliGRAM(s) SubCutaneous every 24 hours  oseltamivir 75 milliGRAM(s) Oral two times a day  pantoprazole    Tablet 40 milliGRAM(s) Oral before breakfast      MEDICATIONS  (PRN):  acetaminophen     Tablet .. 650 milliGRAM(s) Oral every 6 hours PRN Temp greater or equal to 38C (100.4F), Mild Pain (1 - 3)  ondansetron Injectable 4 milliGRAM(s) IV Push every 8 hours PRN Nausea and/or Vomiting      Physical Exam    Neuro :  no focal deficits  Respiratory: CTA B/L  CV: RRR, S1S2, no murmurs,   Abdominal: Soft, NT, ND +BS,  Extremities: No edema, + peripheral pulses    ASSESSMENT    Anemia    Asthma    Ulcerative colitis    No significant past surgical history        PLAN     Patient is a 54y old  Female who presents with a chief complaint of abdominal pain, headache, nausea, vomiting and dizziness     pt seen in icu [  ], reg med floor [ x  ], bed [ x ], chair at bedside [   ], a+o x3 [ x ], lethargic [  ],  nad [ x ]      Allergies    No Known Allergies        Vitals    T(F): 98.9 (12-18-24 @ 04:20), Max: 99.6 (12-17-24 @ 20:45)  HR: 84 (12-18-24 @ 04:20) (73 - 94)  BP: 131/80 (12-18-24 @ 04:20) (119/71 - 136/60)  RR: 18 (12-18-24 @ 04:20) (18 - 18)  SpO2: 98% (12-18-24 @ 04:20) (97% - 99%)  Wt(kg): --  CAPILLARY BLOOD GLUCOSE          Labs                          8.7    9.00  )-----------( 376      ( 18 Dec 2024 05:18 )             28.7       12-18    x   |  x   |  x   ----------------------------<  x   x    |  x   |  0.57    Ca    8.9      17 Dec 2024 13:24  Phos  4.0     12-18  Mg     2.1     12-18    TPro  7.5  /  Alb  x   /  TBili  0.3  /  DBili  x   /  AST  9[L]  /  ALT  11  /  AlkPhos  66  12-18                Radiology Results      Meds    MEDICATIONS  (STANDING):  albuterol/ipratropium for Nebulization 3 milliLiter(s) Nebulizer every 6 hours  enoxaparin Injectable 40 milliGRAM(s) SubCutaneous every 24 hours  oseltamivir 75 milliGRAM(s) Oral two times a day  pantoprazole    Tablet 40 milliGRAM(s) Oral before breakfast      MEDICATIONS  (PRN):  acetaminophen     Tablet .. 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, r/o colon ca, adnexal mass r/o ovarian or tubular malig, hypokalemia, influenza b, h/o  asthma and UC [diagnosed in 2006]       Asthma    Ulcerative colitis    No significant past surgical history        PLAN    s/p transfuse 2 units prbc,   hgb improved appropriately  f/u   ca 125, ca19-9, cea, iron studies   gi cons  protonix   surg cons  mri abd pelv   gyn onc cons   supplemented potassium,   cont tamiflu x 2/5 days,   pulm cons   cont bronchodilators cont singulair   cont current meds         Patient is a 54y old  Female who presents with a chief complaint of abdominal pain, headache, nausea, vomiting and dizziness     pt seen in icu [  ], reg med floor [ x  ], bed [ x ], chair at bedside [   ], a+o x3 [ x ], lethargic [  ],  nad [ x ]      Allergies    No Known Allergies        Vitals    T(F): 98.9 (12-18-24 @ 04:20), Max: 99.6 (12-17-24 @ 20:45)  HR: 84 (12-18-24 @ 04:20) (73 - 94)  BP: 131/80 (12-18-24 @ 04:20) (119/71 - 136/60)  RR: 18 (12-18-24 @ 04:20) (18 - 18)  SpO2: 98% (12-18-24 @ 04:20) (97% - 99%)  Wt(kg): --  CAPILLARY BLOOD GLUCOSE          Labs                          8.7    9.00  )-----------( 376      ( 18 Dec 2024 05:18 )             28.7       12-18    x   |  x   |  x   ----------------------------<  x   x    |  x   |  0.57    Ca    8.9      17 Dec 2024 13:24  Phos  4.0     12-18  Mg     2.1     12-18    TPro  7.5  /  Alb  x   /  TBili  0.3  /  DBili  x   /  AST  9[L]  /  ALT  11  /  AlkPhos  66  12-18                Radiology Results      Meds    MEDICATIONS  (STANDING):  albuterol/ipratropium for Nebulization 3 milliLiter(s) Nebulizer every 6 hours  enoxaparin Injectable 40 milliGRAM(s) SubCutaneous every 24 hours  oseltamivir 75 milliGRAM(s) Oral two times a day  pantoprazole    Tablet 40 milliGRAM(s) Oral before breakfast      MEDICATIONS  (PRN):  acetaminophen     Tablet .. 650 milliGRAM(s) Oral every 6 hours PRN Temp greater or equal to 38C (100.4F), Mild Pain (1 - 3)  ondansetron Injectable 4 milliGRAM(s) IV Push every 8 hours PRN Nausea and/or Vomiting      Physical Exam    Neuro :  no focal deficits  Respiratory: CTA B/L  CV: RRR, S1S2, no murmurs,   Abdominal: Soft, NT, ND +BS,  Extremities: No edema, + peripheral pulses      ASSESSMENT    symptomatic anemia,   omental carcinomatosis likely metastatic disease,   r/o colon ca,   adnexal mass r/o ovarian or tubular malig,   hypokalemia,   influenza b,   h/o  asthma   UC [diagnosed in 2006]         PLAN    s/p transfuse 2 units prbc,   hgb improved appropriately  f/u ca 125, ca19-9, cea, iron studies   gi cons  protonix   surg cons  mri abd pelv   gyn onc cons   supplemented potassium,   cont tamiflu x 2/5 days,   pulm cons   cont bronchodilators   cont singulair   cont current meds

## 2024-12-18 NOTE — CONSULT NOTE ADULT - SUBJECTIVE AND OBJECTIVE BOX
[  ] STAT REQUEST              [  ]ROUTINE REQUEST    Patient is a 54 year old female with abdominal pain. GI consulted to evaluate.         HPI:  54 year old female from home, with past medical history significant for asthma and Ulcerative Colitis presented to the emergency room with 2 weeks history of progressively worsening intermittent 8/10 intensity non radiating sharp lower abdominal pain associated with bloody watery diarrhea associated with nausea, non bloody vomiting, headache and loss of appetite.        PAIN MANAGEMENT:  Pain Scale:                 8/10  Pain Location:  Lower abdominal pain    Prior Colonoscopy:  2022      PAST MEDICAL HISTORY    Asthma    Ulcerative colitis        PAST SURGICAL HISTORY    No significant past surgical history reported        Allergies    No Known Allergies    Intolerances  None         MEDICATIONS  (STANDING):  albuterol/ipratropium for Nebulization 3 milliLiter(s) Nebulizer every 6 hours  enoxaparin Injectable 40 milliGRAM(s) SubCutaneous every 24 hours  oseltamivir 75 milliGRAM(s) Oral two times a day  pantoprazole    Tablet 40 milliGRAM(s) Oral before breakfast    MEDICATIONS  (PRN):  acetaminophen     Tablet .. 650 milliGRAM(s) Oral every 6 hours PRN Temp greater or equal to 38C (100.4F), Mild Pain (1 - 3)  guaiFENesin Oral Liquid (Sugar-Free) 100 milliGRAM(s) Oral every 6 hours PRN Cough  ondansetron Injectable 4 milliGRAM(s) IV Push every 8 hours PRN Nausea and/or Vomiting      SOCIAL HISTORY  Advanced Directives:       [ X ] Full Code       [  ] DNR  Marital Status:         [  ] M      [  ] S      [  ] D       [  ] W  Children:       [ X ] Yes      [  ] No  Occupation:        [  ] Employed       [X  ] Unemployed       [  ] Retired  Diet:       [ X ] Regular       [  ] PEG feeding          [  ] NG tube feeding  Drug Use:           [X  ] No          [  ] Yes  Alcohol:           [X  ] No             [  ] Yes (socially)         [  ] Yes (chronic)  Tobacco:           [  ] Yes           [ X ] No      FAMILY HISTORY  [ X ] Heart Disease            [ X ] Diabetes             [ X ] HTN             [  ] Colon Cancer             [  ] Stomach Cancer              [  ] Pancreatic Cancer      VITAL SIGNS   Vital Signs Last 24 Hrs  T(C): 37.2 (12-18-24 @ 04:20), Max: 37.6 (12-17-24 @ 20:45)  T(F): 98.9 (12-18-24 @ 04:20), Max: 99.6 (12-17-24 @ 20:45)  HR: 88 (12-18-24 @ 13:42) (73 - 88)  BP: 122/74 (12-18-24 @ 13:42) (119/71 - 136/60)  BP(mean): 97 (12-18-24 @ 04:20) (86 - 97)  RR: 17 (12-18-24 @ 13:42) (17 - 18)  SpO2: 96% (12-18-24 @ 13:42) (96% - 99%)           CBC Full  -  ( 18 Dec 2024 05:18 )  WBC Count : 9.00 K/uL  RBC Count : 4.32 M/uL  Hemoglobin : 8.7 g/dL  Hematocrit : 28.7 %  Platelet Count - Automated : 376 K/uL  Mean Cell Volume : 66.4 fl  Mean Cell Hemoglobin : 20.1 pg  Mean Cell Hemoglobin Concentration : 30.3 g/dL  Auto Neutrophil # : x  Auto Lymphocyte # : x  Auto Monocyte # : x  Auto Eosinophil # : x  Auto Basophil # : x  Auto Neutrophil % : x  Auto Lymphocyte % : x  Auto Monocyte % : x  Auto Eosinophil % : x  Auto Basophil % : x      12-18    137  |  107  |  6[L]  ----------------------------<  104[H]  4.2   |  26  |  0.57    Ca    8.9      18 Dec 2024 05:18  Phos  4.0     12-18  Mg     2.1     12-18    TPro  7.5  /  Alb  3.0[L]  /  TBili  0.3  /  DBili  x   /  AST  9[L]  /  ALT  11  /  AlkPhos  66  12-18     PT/INR - ( 17 Dec 2024 14:43 )   PT: 12.6 sec;   INR: 1.09 ratio       PTT - ( 17 Dec 2024 14:43 )  PTT:26.6 sec    Iron with Total Binding Capacity in AM (12.18.24 @ 05:18)   Iron - Total Binding Capacity.: 344 ug/dL  % Saturation, Iron: 4 %  Iron Total: 15 ug/dL  Unsaturated Iron Binding Capacity: 329 ug/dL    Urinalysis with Rflx Culture (12.18.24 @ 07:57)   Urine Appearance: Clear  Color: Yellow  Specific Gravity: 1.012  pH Urine: 7.5  Protein, Urine: Negative mg/dL  Glucose Qualitative, Urine: Negative mg/dL  Ketone - Urine: Negative mg/dL  Blood, Urine: Negative  Bilirubin: Negative  Urobilinogen: 0.2 mg/dL  Leukocyte Esterase Concentration: Negative  Nitrite: Negative    < from: 12 Lead ECG (06.26.21 @ 17:45) >    Ventricular Rate 78 BPM    Atrial Rate 78 BPM    P-R Interval 146 ms    QRS Duration 98 ms    Q-T Interval 400 ms    QTC Calculation(Bazett) 456 ms    P Axis 41 degrees    R Axis 22 degrees    T Axis 9 degrees    Diagnosis Line Normal sinus rhythm  Incomplete right bundle branch block  Nonspecific T wave abnormality  Abnormal ECG    < end of copied text >      RADIOLOGY/IMAGING                ACC: [id number] EXAM:  CT ABDOMEN AND PELVIS IC   ORDERED BY: JEFFREY HERNÁNDEZ     PROCEDURE DATE:  12/17/2024          INTERPRETATION:  CLINICAL INFORMATION: 54 years  Female with lower   abdominal pain, diarrhea/ r/o colitis.    COMPARISON: CT abdomen and pelvis 6/27/2021    CONTRAST/COMPLICATIONS:  IV Contrast: Omnipaque 350  90 cc administered   10 cc discarded  Oral Contrast: NONE  .    PROCEDURE:  CT of the Abdomen and Pelvis was performed.  Sagittal and coronal reformats were performed.    FINDINGS:  LOWER CHEST: Stable 1 cm right middle lobe bulla.    LIVER: Within normal limits.  BILE DUCTS: Normal caliber.  GALLBLADDER: Within normal limits.  SPLEEN: Within normal limits.  PANCREAS: Within normal limits.  ADRENALS: Within normal limits.  KIDNEYS/URETERS: Within normal limits.    BLADDER: Within normal limits.  REPRODUCTIVE ORGANS: Unremarkable uterus. 7.0 x 2.6 x 3.2 cm tubular   collection in the right adnexa.    BOWEL: No bowel obstruction. Appendix  . Irregular nonspecific thickening   of the sigmoid colon.  PERITONEUM/RETROPERITONEUM: Small ascites is new. Extensive nodular soft   tissue infiltration throughout the omentum consistent with carcinomatosis.  VESSELS: Within normal limits.  LYMPH NODES: No lymphadenopathy.  ABDOMINAL WALL: Within normal limits.  BONES: Within normal limits.    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    7.0 x 2.6 x 3.2 cm cystic right adnexal lesion, possibly ovarian or   tubular malignancy. Recommend pelvic MRI.    Irregular thickening of the sigmoid colon also concerning for malignancy.   Recommend colonoscopy.        EXAM:  CT ABDOMEN AND PELVIS OC IC                            PROCEDURE DATE:  06/27/2021          INTERPRETATION:  CLINICAL INFORMATION: Gastrointestinal hemorrhage. History of ulcerative colitis.    COMPARISON: None.    CONTRAST/COMPLICATIONS:  IV Contrast: Omnipaque 350  90 cc administered   10 cc discarded  Oral Contrast: Omnipaque 300  Complications: None reported at time of study completion    PROCEDURE:  CT of the Abdomen and Pelvis was performed.  Sagittal and coronal reformats were performed.    FINDINGS:  LOWER CHEST: Within normal limits.    LIVER: Within normal limits.  BILE DUCTS: Normal caliber.  GALLBLADDER: Within normal limits.  SPLEEN: Within normal limits.  PANCREAS: Within normal limits.  ADRENALS: Within normal limits.  KIDNEYS/URETERS: Within normal limits.    BLADDER: Within normal limits.  REPRODUCTIVE ORGANS: Enlarged mildly lobulated appearing uterus, which may reflect underlying fibroids.    BOWEL: No bowel obstruction. Oral contrast material reaches the distal transverse colon. Presence of oral contrast material precludes evaluation for intraluminal extravasation of intravenous contrast. Mild sigmoid colonic and rectal wall thickening without significant pericolonic inflammation. Appendix is normal.  PERITONEUM: No ascites.  VESSELS: Within normal limits.  RETROPERITONEUM/LYMPH NODES: No lymphadenopathy.  ABDOMINAL WALL: Within normal limits.  BONES: Sclerotic focus within the left femoral head, may represent a bone island.    IMPRESSION:  Sigmoid colonic and rectal wall thickening without surrounding inflammation. Findings may represent sequelae of patient's known inflammatory bowel disease.

## 2024-12-18 NOTE — CONSULT NOTE ADULT - PROBLEM SELECTOR RECOMMENDATION 9
s/p transfuse 2 units prbc,   hgb improved appropriately  CBC follow up s/p transfusion of 2 units PRBCs  hgb improved  monitor CBC  anemia panel  Stool for occult blood

## 2024-12-18 NOTE — PROGRESS NOTE ADULT - PROBLEM SELECTOR PLAN 2
- P/w Abdominal pain x 2 weeks, nausea and vomiting  - CTAP showed a 7.0 x 2.6 x 3.2 cm cystic R adnexal lesion, possibly ovarian or tubular malignancy, ascites and omental carcinomatosis c/w metastatic disease  - MR A/P pending-f/u results.  MR Safety form sent to MR Dept.   - GYN consult pending-f/u rec's   - Sx consult pending-f/u rec's

## 2024-12-18 NOTE — PROGRESS NOTE ADULT - PROBLEM SELECTOR PLAN 4
- See above - Per GI, Solumedrol 60mg q6h  - See above - Temp noted, likely 2/2 malignancy vs. UC.  Bld cx in AM-f/u results.   - Per GI, Solumedrol 60mg q6h  - See above

## 2024-12-18 NOTE — PROGRESS NOTE ADULT - PROBLEM SELECTOR PLAN 1
- P/w HA & dizziness  - Symptomatic Hgb 5.3. S/p 2U PRBc's   - Per Attending monitor CBC daily  - S/p TIBC wnl  - Ferritin pending-f/u results - P/w HA & dizziness  - Symptomatic Hgb 5.3. S/p 2U PRBC's   - Per Attending monitor CBC daily  - S/p TIBC wnl  - Ferritin pending-f/u results

## 2024-12-18 NOTE — CONSULT NOTE ADULT - SUBJECTIVE AND OBJECTIVE BOX
HPI: 54F, from home, PMHx asthma and UC [diagnosed in ] presented to the ED for abdominal pain x 2 weeks, headache, nausea, and vomiting for 1 week and dizziness for 1 day. Endorses non-radiating, intermittent sharp lower abdominal pain. Also complained on a generalized headache, and nausea and nonbloody, nonbilious vomiting with decreased PO intake. Patient noted to have bloody stool for past 2 weeks and diarrhea that started yesterday. Patient states she feels very weak. Most recent colonoscopy was in , which was unremarkable. Patient saw PCP Dr. Jenae Wiggins recently who recommended a repeat colonoscopy which was scheduled for next month. Denies fever, chills, CP. SOB, palpitations, constipation or urinary Sx.         PAST MEDICAL & SURGICAL HISTORY:  Asthma      Ulcerative colitis      No significant past surgical history          MEDICATIONS  (STANDING):  albuterol/ipratropium for Nebulization 3 milliLiter(s) Nebulizer every 6 hours  enoxaparin Injectable 40 milliGRAM(s) SubCutaneous every 24 hours  oseltamivir 75 milliGRAM(s) Oral two times a day  pantoprazole    Tablet 40 milliGRAM(s) Oral before breakfast    MEDICATIONS  (PRN):  acetaminophen     Tablet .. 650 milliGRAM(s) Oral every 6 hours PRN Temp greater or equal to 38C (100.4F), Mild Pain (1 - 3)  guaiFENesin Oral Liquid (Sugar-Free) 100 milliGRAM(s) Oral every 6 hours PRN Cough  ondansetron Injectable 4 milliGRAM(s) IV Push every 8 hours PRN Nausea and/or Vomiting      Allergies    No Known Allergies    Intolerances        ROS: Negative except per HPI.     SOCIAL HISTORY:  Smoking history   ETOH history    OBJECTIVE:    Vital Signs Last 24 Hrs  T(C): 38.1 (18 Dec 2024 13:42), Max: 38.1 (18 Dec 2024 13:42)  T(F): 100.5 (18 Dec 2024 13:42), Max: 100.5 (18 Dec 2024 13:42)  HR: 88 (18 Dec 2024 13:42) (73 - 88)  BP: 122/74 (18 Dec 2024 13:42) (122/74 - 136/60)  BP(mean): 97 (18 Dec 2024 04:20) (86 - 97)  RR: 17 (18 Dec 2024 13:42) (17 - 18)  SpO2: 96% (18 Dec 2024 13:42) (96% - 99%)    Parameters below as of 18 Dec 2024 13:42  Patient On (Oxygen Delivery Method): room air        I&O's Summary      LABS:                        8.7    9.00  )-----------( 376      ( 18 Dec 2024 05:18 )             28.7     12-18    137  |  107  |  6[L]  ----------------------------<  104[H]  4.2   |  26  |  0.57    Ca    8.9      18 Dec 2024 05:18  Phos  4.0       Mg     2.1     18    TPro  7.5  /  Alb  3.0[L]  /  TBili  0.3  /  DBili  x   /  AST  9[L]  /  ALT  11  /  AlkPhos  66  1218    PT/INR - ( 17 Dec 2024 14:43 )   PT: 12.6 sec;   INR: 1.09 ratio         PTT - ( 17 Dec 2024 14:43 )  PTT:26.6 sec  Urinalysis Basic - ( 18 Dec 2024 07:57 )    Color: Yellow / Appearance: Clear / S.012 / pH: x  Gluc: x / Ketone: Negative mg/dL  / Bili: Negative / Urobili: 0.2 mg/dL   Blood: x / Protein: Negative mg/dL / Nitrite: Negative   Leuk Esterase: Negative / RBC: x / WBC x   Sq Epi: x / Non Sq Epi: x / Bacteria: x      CAPILLARY BLOOD GLUCOSE        LIVER FUNCTIONS - ( 18 Dec 2024 05:18 )  Alb: 3.0 g/dL / Pro: 7.5 g/dL / ALK PHOS: 66 U/L / ALT: 11 U/L DA / AST: 9 U/L / GGT: x             Cultures:      PHYSICAL EXAM:   General: AOx3, NAD.   HEENT: NC/AT. EOMI. Trachea midline.  Cardio: RR  Pulm: Normal chest rise and expansion. Non-labored breathing.  GI: Abdomen soft, nondistended, nontender. No rebound tenderness.   : Circumcised/ uncircumsised male. Testicles descended bilaterally, soft. No scrotal edema. [place]   Extremities: Warm, dry with 3 sec cap refill. No edema or swelling noted b/l.  No overlying skin changes noted. No calf tenderness b/l.   Vascular:  Carotid pulse   Brachial   Radial  Femoral   Popliteal  PT   DP  Back: No CVAT b/l.     RADIOLOGY & ADDITIONAL STUDIES:  < from: CT Abdomen and Pelvis w/ IV Cont (24 @ 15:14) >    IMPRESSION:  Ascites and omental carcinomatosis consistent with metastatic disease.    7.0 x 2.6 x 3.2 cm cystic right adnexal lesion, possibly ovarian or   tubular malignancy. Recommend pelvic MRI.    Irregular thickening of the sigmoid colon also concerning for malignancy.   Recommend colonoscopy.        --- End of Report ---    < end of copied text >       HPI: 54F, from home, PMHx asthma and UC [diagnosed in ] presented to the ED for abdominal pain x 2 weeks, headache, nausea, and vomiting for 1 week and dizziness for 1 day. Also complained on a generalized headache, and nausea and nonbloody, nonbilious vomiting with decreased PO intake. Reports ~10lb weight loss over the last few weeks. Patient noted to have bloody stool 2 weeks ago and diarrhea that started yesterday. Patient states she feels very weak. Most recent colonoscopy was in , which she states showed inflammation but no mass or polyps. Patient saw PCP Dr. Jenae Wiggins recently who recommended a repeat colonoscopy which was scheduled for next month. Denies fever, chills, CP. SOB, palpitations, recent hematochezia or urinary Sx.   Denies family history, smoking history.         PAST MEDICAL & SURGICAL HISTORY:  Asthma      Ulcerative colitis      No significant past surgical history          MEDICATIONS  (STANDING):  albuterol/ipratropium for Nebulization 3 milliLiter(s) Nebulizer every 6 hours  enoxaparin Injectable 40 milliGRAM(s) SubCutaneous every 24 hours  oseltamivir 75 milliGRAM(s) Oral two times a day  pantoprazole    Tablet 40 milliGRAM(s) Oral before breakfast    MEDICATIONS  (PRN):  acetaminophen     Tablet .. 650 milliGRAM(s) Oral every 6 hours PRN Temp greater or equal to 38C (100.4F), Mild Pain (1 - 3)  guaiFENesin Oral Liquid (Sugar-Free) 100 milliGRAM(s) Oral every 6 hours PRN Cough  ondansetron Injectable 4 milliGRAM(s) IV Push every 8 hours PRN Nausea and/or Vomiting      Allergies    No Known Allergies    Intolerances        ROS: Negative except per HPI.     SOCIAL HISTORY:  Smoking history   ETOH history    OBJECTIVE:    Vital Signs Last 24 Hrs  T(C): 38.1 (18 Dec 2024 13:42), Max: 38.1 (18 Dec 2024 13:42)  T(F): 100.5 (18 Dec 2024 13:42), Max: 100.5 (18 Dec 2024 13:42)  HR: 88 (18 Dec 2024 13:42) (73 - 88)  BP: 122/74 (18 Dec 2024 13:42) (122/74 - 136/60)  BP(mean): 97 (18 Dec 2024 04:20) (86 - 97)  RR: 17 (18 Dec 2024 13:42) (17 - 18)  SpO2: 96% (18 Dec 2024 13:42) (96% - 99%)    Parameters below as of 18 Dec 2024 13:42  Patient On (Oxygen Delivery Method): room air        I&O's Summary      LABS:                        8.7    9.00  )-----------( 376      ( 18 Dec 2024 05:18 )             28.7         137  |  107  |  6[L]  ----------------------------<  104[H]  4.2   |  26  |  0.57    Ca    8.9      18 Dec 2024 05:18  Phos  4.0       Mg     2.1         TPro  7.5  /  Alb  3.0[L]  /  TBili  0.3  /  DBili  x   /  AST  9[L]  /  ALT  11  /  AlkPhos  66      PT/INR - ( 17 Dec 2024 14:43 )   PT: 12.6 sec;   INR: 1.09 ratio         PTT - ( 17 Dec 2024 14:43 )  PTT:26.6 sec  Urinalysis Basic - ( 18 Dec 2024 07:57 )    Color: Yellow / Appearance: Clear / S.012 / pH: x  Gluc: x / Ketone: Negative mg/dL  / Bili: Negative / Urobili: 0.2 mg/dL   Blood: x / Protein: Negative mg/dL / Nitrite: Negative   Leuk Esterase: Negative / RBC: x / WBC x   Sq Epi: x / Non Sq Epi: x / Bacteria: x      CAPILLARY BLOOD GLUCOSE        LIVER FUNCTIONS - ( 18 Dec 2024 05:18 )  Alb: 3.0 g/dL / Pro: 7.5 g/dL / ALK PHOS: 66 U/L / ALT: 11 U/L DA / AST: 9 U/L / GGT: x             Cultures:      PHYSICAL EXAM:   General: AOx3, NAD.   HEENT: NC/AT. EOMI. Trachea midline.  Cardio: RR  Pulm: Normal chest rise and expansion. Non-labored breathing.  GI: Abdomen soft, nondistended, nontender. No rebound tenderness. No palpable masses         RADIOLOGY & ADDITIONAL STUDIES:  < from: CT Abdomen and Pelvis w/ IV Cont (. @ 15:14) >    IMPRESSION:  Ascites and omental carcinomatosis consistent with metastatic disease.    7.0 x 2.6 x 3.2 cm cystic right adnexal lesion, possibly ovarian or   tubular malignancy. Recommend pelvic MRI.    Irregular thickening of the sigmoid colon also concerning for malignancy.   Recommend colonoscopy.        --- End of Report ---    < end of copied text >

## 2024-12-18 NOTE — CONSULT NOTE ADULT - PROBLEM SELECTOR RECOMMENDATION 2
cont bronchodilators   cont singulair  Oxygen supp PRN cont bronchodilators PRN  Breo Ellipta  cont singulair  Oxygen supp PRN

## 2024-12-18 NOTE — CONSULT NOTE ADULT - ASSESSMENT
1. Abdominal pain  2. Bloody diarrhea  3. Iron deficiency anemia  4. Ulcerative colitis exacerbation  5. Ascites  6. Peritoneal carcinomatosis  7. R/o colon cancer  8. R/o ovarian cancer    Suggestions:    1. Monitor H/H  2. Transfuse PRBC as needed  3. Check stool for culture and PCR  4. Solumedrol 60mg q6h  5. Colonoscopy  6. GYN evaluation  7. Check CEA and   8. Protonix 40mg daily  9. Avoid NSAID  10. Oncology evaluation  11. Diet as tolerated  12. Iron supplement IV  13. DVT prophylaxis

## 2024-12-18 NOTE — PROGRESS NOTE ADULT - PROBLEM SELECTOR PLAN 8
H/o Asthma on Breo ellipta  - Stable not in exacerbation   - Rest above   - C/w Duoneb H/o Asthma on Breo ellipta  - Stable not in exacerbation   - Rest above   - C/w Singulair & Duoneb

## 2024-12-18 NOTE — PROGRESS NOTE ADULT - PROBLEM SELECTOR PLAN 3
H/o UC, dx 2006, Reported [place] 2022, unremarkable  - P/w abdominal pain & bloody stool x 2 weeks  - CTAP showed irregular thickening of sigmoid colon also concerning for malignancy  - CEA, CA 19-9, & 125 pending-f/u results   - Rest above   - GI-Dr. Mcwilliams

## 2024-12-18 NOTE — CONSULT NOTE ADULT - ASSESSMENT
7.0 x 2.6 x 3.2 cm cystic right adnexal lesion, possibly ovarian or tubular malignancy.   Possible GYN malignancy at R adnexa

## 2024-12-18 NOTE — CONSULT NOTE ADULT - SUBJECTIVE AND OBJECTIVE BOX
PULMONARY CONSULT NOTE      RACIEL WINTER  MRN-[id number]    Patient is a 54y old  Female who presents with a chief complaint of abdominal pain, headache, nausea, vomiting and dizziness (18 Dec 2024 06:45)      HISTORY OF PRESENT ILLNESS:  54 F, from home, PMHx asthma and UC [diagnosed in ] presented to the ED for abdominal pain x 2 weeks, headache, nausea, and vomiting for 1 week and dizziness for 1 day. Endorses non-radiating, intermittent sharp lower abdominal pain. Also complained on a generalized headache, and nausea and nonbloody, nonbilious vomiting with decreased PO intake. Patient noted to have bloody stool for past 2 weeks and diarrhea that started yesterday. Patient states she feels very weak. Most recent colonoscopy was in , which was unremarkable. Patient saw PCP Dr. Jenae Wiggins recently who recommended a repeat colonoscopy which was scheduled for next month. Denies fever, chills, CP. SOB, palpitations, constipation or urinary Sx.     MEDICATIONS  (STANDING):  albuterol/ipratropium for Nebulization 3 milliLiter(s) Nebulizer every 6 hours  enoxaparin Injectable 40 milliGRAM(s) SubCutaneous every 24 hours  oseltamivir 75 milliGRAM(s) Oral two times a day  pantoprazole    Tablet 40 milliGRAM(s) Oral before breakfast      MEDICATIONS  (PRN):  acetaminophen     Tablet .. 650 milliGRAM(s) Oral every 6 hours PRN Temp greater or equal to 38C (100.4F), Mild Pain (1 - 3)  guaiFENesin Oral Liquid (Sugar-Free) 100 milliGRAM(s) Oral every 6 hours PRN Cough  ondansetron Injectable 4 milliGRAM(s) IV Push every 8 hours PRN Nausea and/or Vomiting      Allergies    No Known Allergies    Intolerances        PAST MEDICAL & SURGICAL HISTORY:  Asthma      Ulcerative colitis      No significant past surgical history          FAMILY HISTORY:  No pertinent family history in first degree relatives        SOCIAL HISTORY  Smoking History:     REVIEW OF SYSTEMS:    CONSTITUTIONAL:  No fevers, chills, sweats    HEENT:  Eyes:  No diplopia or blurred vision. ENT:  No earache, sore throat or runny nose.    CARDIOVASCULAR:  No pressure, squeezing, tightness, or heaviness about the chest; no palpitations.    RESPIRATORY:  Per HPI    GASTROINTESTINAL:  No abdominal pain, nausea, vomiting or diarrhea.    GENITOURINARY:  No dysuria, frequency or urgency.    NEUROLOGIC:  No paresthesias, fasciculations, seizures or weakness.    PSYCHIATRIC:  No disorder of thought or mood.    Vital Signs Last 24 Hrs  T(C): 37.2 (18 Dec 2024 04:20), Max: 37.6 (17 Dec 2024 20:45)  T(F): 98.9 (18 Dec 2024 04:20), Max: 99.6 (17 Dec 2024 20:45)  HR: 84 (18 Dec 2024 04:20) (73 - 94)  BP: 131/80 (18 Dec 2024 04:20) (119/71 - 136/60)  BP(mean): 97 (18 Dec 2024 04:20) (86 - 97)  RR: 18 (18 Dec 2024 04:20) (18 - 18)  SpO2: 98% (18 Dec 2024 04:20) (97% - 99%)    Parameters below as of 18 Dec 2024 04:20  Patient On (Oxygen Delivery Method): room air      I&O's Detail      PHYSICAL EXAMINATION:    GENERAL: The patient is a well-developed, well-nourished _____in no apparent distress.     HEENT: Head is normocephalic and atraumatic. Extraocular muscles are intact. Mucous membranes are moist.     NECK: Supple.     LUNGS: Clear to auscultation without wheezing, rales, or rhonchi. Respirations unlabored    HEART: Regular rate and rhythm without murmur.    ABDOMEN: Soft, nontender, and nondistended.  No hepatosplenomegaly is noted.    EXTREMITIES: Without any cyanosis, clubbing, rash, lesions or edema.    NEUROLOGIC: Grossly intact.      LABS:                        8.7    9.00  )-----------( 376      ( 18 Dec 2024 05:18 )             28.7     12-18    137  |  107  |  6[L]  ----------------------------<  104[H]  4.2   |  26  |  0.57    Ca    8.9      18 Dec 2024 05:18  Phos  4.0     12-18  Mg     2.1     12-18    TPro  7.5  /  Alb  3.0[L]  /  TBili  0.3  /  DBili  x   /  AST  9[L]  /  ALT  11  /  AlkPhos  66  12-18    PT/INR - ( 17 Dec 2024 14:43 )   PT: 12.6 sec;   INR: 1.09 ratio         PTT - ( 17 Dec 2024 14:43 )  PTT:26.6 sec  Urinalysis Basic - ( 18 Dec 2024 07:57 )    Color: Yellow / Appearance: Clear / S.012 / pH: x  Gluc: x / Ketone: Negative mg/dL  / Bili: Negative / Urobili: 0.2 mg/dL   Blood: x / Protein: Negative mg/dL / Nitrite: Negative   Leuk Esterase: Negative / RBC: x / WBC x   Sq Epi: x / Non Sq Epi: x / Bacteria: x                      MICROBIOLOGY:    RADIOLOGY & ADDITIONAL STUDIES:    CXR:  < from: CT Abdomen and Pelvis w/ IV Cont (24 @ 15:14) >  FINDINGS:  LOWER CHEST: Stable 1 cm right middle lobe bulla.    < end of copied text >  < from: CT Abdomen and Pelvis w/ IV Cont (24 @ 15:14) >  IMPRESSION:  Ascites and omental carcinomatosis consistent with metastatic disease.    7.0 x 2.6 x 3.2 cm cystic right adnexal lesion, possibly ovarian or   tubular malignancy. Recommend pelvic MRI.    Irregular thickening of the sigmoid colon also concerning for malignancy.   Recommend colonoscopy.        --- End of Report ---    < end of copied text >    Ct scan chest;    ekg;    echo:

## 2024-12-18 NOTE — CONSULT NOTE ADULT - PROBLEM SELECTOR RECOMMENDATION 9
- Follow up CEA, CA 19-9,    - Follow up MRI A/P   - Management per medicine team   - Discussed with Dr. Huang   - GYN intervention pending above

## 2024-12-18 NOTE — CONSULT NOTE ADULT - ASSESSMENT
54 year old, F, from home, w/ PMH Asthma and UC [diagnosed in 2006].  Presented to the ED for abdominal pain x 2 weeks, headache, nausea, and vomiting x 1 week and dizziness x 1 day. Endorses non-radiating, intermittent sharp lower abdominal pain. Also complained on a generalized headache, and nausea and nonbloody, nonbilious vomiting with decreased PO intake. Patient noted to have bloody stool for past  2 weeks and diarrhea that started yesterday. Patient states she feels very weak. Most recent colonoscopy was in 2022, which was unremarkable. Patient saw PCP Dr. Jenae Wiggins recently who recommended a repeat colonoscopy which was scheduled for next month.  Reported Colonoscopy 2022   Admitted to medicine for Symptomatic anemia and malignancy [place].    CTAP showed a 7.0 x 2.6 x 3.2 cm cystic R adnexal lesion, possibly ovarian or tubular malignancy, ascites and omental carcinomatosis c/w metastatic disease     PLAN   - F/U GI for colonoscopy   - f/u tumor markers   - f/u MRI results   - Heme/onc consult  - Gyn/onc consult   - Transfuse PRN s/p 2 u PRBCS 54 year old, F, from home, w/ PMH Asthma and UC [diagnosed in 2006].  Presented to the ED for abdominal pain x 2 weeks, headache, nausea, and vomiting x 1 week and dizziness x 1 day  Admitted to medicine for Symptomatic anemia and malignancy [place].    CTAP showed a 7.0 x 2.6 x 3.2 cm cystic R adnexal lesion, possibly ovarian or tubular malignancy, ascites and omental carcinomatosis c/w metastatic disease     PLAN   - No acute intervention   - F/U GI for colonoscopy   - f/u tumor markers   - f/u MRI results   - Heme/onc consult  - Gyn/onc consult   - Transfuse PRN s/p 2 u PRBCS    Discussed with Dr. Morel

## 2024-12-18 NOTE — CONSULT NOTE ADULT - SUBJECTIVE AND OBJECTIVE BOX
54 year old  w/ PMH Asthma and UC admitted for abdominal pain x 2 weeks, headache, nausea, and vomiting x 1 week and dizziness x 1 day,   GYN team consulted for 7 x 2.6 x 3.2 cystic R adnexal lesion. Denies vaginal bleeding, vaginal discharge, pelvic pain, chest pain, shortness of breath, dizziness, palpitations, d/c, fever, chills or any other complaints     pob/gynhx: , last saw gynecologist 2-3 years ago, reports LMP was 2 years ago   last mammogram normal in   Pt denies std's, abnormal pap smear, fibroids or ovarian cysts    REVIEW OF SYSTEMS: see HPI	  PE:  Vital Signs Last 24 Hrs  T(C): 38.6 (18 Dec 2024 14:30), Max: 38.6 (18 Dec 2024 14:30)  T(F): 101.5 (18 Dec 2024 14:30), Max: 101.5 (18 Dec 2024 14:30)  HR: 88 (18 Dec 2024 13:42) (73 - 88)  BP: 122/74 (18 Dec 2024 13:42) (122/74 - 136/60)  BP(mean): 97 (18 Dec 2024 04:20) (86 - 97)  RR: 17 (18 Dec 2024 13:42) (17 - 18)  SpO2: 96% (18 Dec 2024 13:42) (96% - 99%)  Parameters below as of 18 Dec 2024 13:42  Patient On (Oxygen Delivery Method): room air      Gen: A&Ox3, NAD  abd: soft, nontender, nondistended, no rebound or guarding; no cvat b/l  pelvis: no vaginal bleeding, no discharge     LABS:                        8.7    9.00  )-----------( 376      ( 18 Dec 2024 05:18 )             28.7     12-18    137  |  107  |  6[L]  ----------------------------<  104[H]  4.2   |  26  |  0.57    Ca    8.9      18 Dec 2024 05:18  Phos  4.0     12-18  Mg     2.1     12-18    TPro  7.5  /  Alb  3.0[L]  /  TBili  0.3  /  DBili  x   /  AST  9[L]  /  ALT  11  /  AlkPhos  66  12-18    PT/INR - ( 17 Dec 2024 14:43 )   PT: 12.6 sec;   INR: 1.09 ratio         PTT - ( 17 Dec 2024 14:43 )  PTT:26.6 sec  Urinalysis Basic - ( 18 Dec 2024 07:57 )    Color: Yellow / Appearance: Clear / S.012 / pH: x  Gluc: x / Ketone: Negative mg/dL  / Bili: Negative / Urobili: 0.2 mg/dL   Blood: x / Protein: Negative mg/dL / Nitrite: Negative   Leuk Esterase: Negative / RBC: x / WBC x   Sq Epi: x / Non Sq Epi: x / Bacteria: x    < from: CT Abdomen and Pelvis w/ IV Cont (24 @ 15:14) >    ACC: [id number] EXAM:  CT ABDOMEN AND PELVIS IC   ORDERED BY: JEFFREY HERNÁNDEZ     PROCEDURE DATE:  2024          INTERPRETATION:  CLINICAL INFORMATION: 54 years  Female with lower   abdominal pain, diarrhea/ r/o colitis.    COMPARISON: CT abdomen and pelvis 2021    CONTRAST/COMPLICATIONS:  IV Contrast: Omnipaque 350  90 cc administered   10 cc discarded  Oral Contrast: NONE  .    PROCEDURE:  CT of the Abdomen and Pelvis was performed.  Sagittal and coronal reformats were performed.    FINDINGS:  LOWER CHEST: Stable 1 cm right middle lobe bulla.    LIVER: Within normal limits.  BILE DUCTS: Normal caliber.  GALLBLADDER: Within normal limits.  SPLEEN: Within normal limits.  PANCREAS: Within normal limits.  ADRENALS: Within normal limits.  KIDNEYS/URETERS: Within normal limits.    BLADDER: Within normal limits.  REPRODUCTIVE ORGANS: Unremarkable uterus. 7.0 x 2.6 x 3.2 cm tubular   collection in the right adnexa.    BOWEL: No bowel obstruction. Appendix  . Irregular nonspecific thickening   of the sigmoid colon.  PERITONEUM/RETROPERITONEUM: Small ascites is new. Extensive nodular soft   tissue infiltration throughout the omentum consistent with carcinomatosis.  VESSELS: Within normal limits.  LYMPH NODES: No lymphadenopathy.  ABDOMINAL WALL: Within normal limits.  BONES: Within normal limits.    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    7.0 x 2.6 x 3.2 cm cystic right adnexal lesion, possibly ovarian or   tubular malignancy. Recommend pelvic MRI.    Irregular thickening of the sigmoid colon also concerning for malignancy.   Recommend colonoscopy.        --- End of Report ---            ROMEL SAAVEDRA MD; Attending Radiologist  This document has been electronically signed. Dec 17 2024  3:41PM    < end of copied text >

## 2024-12-19 DIAGNOSIS — Z75.8 OTHER PROBLEMS RELATED TO MEDICAL FACILITIES AND OTHER HEALTH CARE: ICD-10-CM

## 2024-12-19 LAB
ALBUMIN SERPL ELPH-MCNC: 3.3 G/DL — LOW (ref 3.5–5)
ALP SERPL-CCNC: 68 U/L — SIGNIFICANT CHANGE UP (ref 40–120)
ALT FLD-CCNC: 16 U/L DA — SIGNIFICANT CHANGE UP (ref 10–60)
ANION GAP SERPL CALC-SCNC: 7 MMOL/L — SIGNIFICANT CHANGE UP (ref 5–17)
ANISOCYTOSIS BLD QL: SLIGHT — SIGNIFICANT CHANGE UP
AST SERPL-CCNC: 13 U/L — SIGNIFICANT CHANGE UP (ref 10–40)
BASOPHILS # BLD AUTO: 0.01 K/UL — SIGNIFICANT CHANGE UP (ref 0–0.2)
BASOPHILS NFR BLD AUTO: 0.1 % — SIGNIFICANT CHANGE UP (ref 0–2)
BILIRUB SERPL-MCNC: 0.4 MG/DL — SIGNIFICANT CHANGE UP (ref 0.2–1.2)
BUN SERPL-MCNC: 7 MG/DL — SIGNIFICANT CHANGE UP (ref 7–18)
CALCIUM SERPL-MCNC: 9.3 MG/DL — SIGNIFICANT CHANGE UP (ref 8.4–10.5)
CHLORIDE SERPL-SCNC: 101 MMOL/L — SIGNIFICANT CHANGE UP (ref 96–108)
CO2 SERPL-SCNC: 25 MMOL/L — SIGNIFICANT CHANGE UP (ref 22–31)
CREAT SERPL-MCNC: 0.54 MG/DL — SIGNIFICANT CHANGE UP (ref 0.5–1.3)
EGFR: 109 ML/MIN/1.73M2 — SIGNIFICANT CHANGE UP
EOSINOPHIL # BLD AUTO: 0.01 K/UL — SIGNIFICANT CHANGE UP (ref 0–0.5)
EOSINOPHIL NFR BLD AUTO: 0.1 % — SIGNIFICANT CHANGE UP (ref 0–6)
GLUCOSE SERPL-MCNC: 197 MG/DL — HIGH (ref 70–99)
HCT VFR BLD CALC: 29.4 % — LOW (ref 34.5–45)
HGB BLD-MCNC: 9.1 G/DL — LOW (ref 11.5–15.5)
HYPOCHROMIA BLD QL: SIGNIFICANT CHANGE UP
IMM GRANULOCYTES NFR BLD AUTO: 0.4 % — SIGNIFICANT CHANGE UP (ref 0–0.9)
LYMPHOCYTES # BLD AUTO: 0.46 K/UL — LOW (ref 1–3.3)
LYMPHOCYTES # BLD AUTO: 6.3 % — LOW (ref 13–44)
MANUAL SMEAR VERIFICATION: SIGNIFICANT CHANGE UP
MCHC RBC-ENTMCNC: 20 PG — LOW (ref 27–34)
MCHC RBC-ENTMCNC: 31 G/DL — LOW (ref 32–36)
MCV RBC AUTO: 64.5 FL — LOW (ref 80–100)
MICROCYTES BLD QL: SLIGHT — SIGNIFICANT CHANGE UP
MONOCYTES # BLD AUTO: 0.1 K/UL — SIGNIFICANT CHANGE UP (ref 0–0.9)
MONOCYTES NFR BLD AUTO: 1.4 % — LOW (ref 2–14)
NEUTROPHILS # BLD AUTO: 6.74 K/UL — SIGNIFICANT CHANGE UP (ref 1.8–7.4)
NEUTROPHILS NFR BLD AUTO: 91.7 % — HIGH (ref 43–77)
NRBC # BLD: 0 /100 WBCS — SIGNIFICANT CHANGE UP (ref 0–0)
OVALOCYTES BLD QL SMEAR: SLIGHT — SIGNIFICANT CHANGE UP
PLAT MORPH BLD: NORMAL — SIGNIFICANT CHANGE UP
PLATELET # BLD AUTO: 395 K/UL — SIGNIFICANT CHANGE UP (ref 150–400)
PLATELET COUNT - ESTIMATE: NORMAL — SIGNIFICANT CHANGE UP
POIKILOCYTOSIS BLD QL AUTO: SLIGHT — SIGNIFICANT CHANGE UP
POTASSIUM SERPL-MCNC: 3.9 MMOL/L — SIGNIFICANT CHANGE UP (ref 3.5–5.3)
POTASSIUM SERPL-SCNC: 3.9 MMOL/L — SIGNIFICANT CHANGE UP (ref 3.5–5.3)
PROT SERPL-MCNC: 8.1 G/DL — SIGNIFICANT CHANGE UP (ref 6–8.3)
RBC # BLD: 4.56 M/UL — SIGNIFICANT CHANGE UP (ref 3.8–5.2)
RBC # FLD: 25.9 % — HIGH (ref 10.3–14.5)
RBC BLD AUTO: ABNORMAL
SODIUM SERPL-SCNC: 133 MMOL/L — LOW (ref 135–145)
SPHEROCYTES BLD QL SMEAR: SLIGHT — SIGNIFICANT CHANGE UP
STOMATOCYTES BLD QL SMEAR: SLIGHT — SIGNIFICANT CHANGE UP
WBC # BLD: 7.35 K/UL — SIGNIFICANT CHANGE UP (ref 3.8–10.5)
WBC # FLD AUTO: 7.35 K/UL — SIGNIFICANT CHANGE UP (ref 3.8–10.5)

## 2024-12-19 PROCEDURE — 72197 MRI PELVIS W/O & W/DYE: CPT | Mod: 26

## 2024-12-19 PROCEDURE — 99232 SBSQ HOSP IP/OBS MODERATE 35: CPT

## 2024-12-19 RX ADMIN — Medication 100 MILLIGRAM(S): at 12:11

## 2024-12-19 RX ADMIN — METHYLPREDNISOLONE 60 MILLIGRAM(S): 4 TABLET ORAL at 18:17

## 2024-12-19 RX ADMIN — ENOXAPARIN SODIUM 40 MILLIGRAM(S): 60 INJECTION INTRAVENOUS; SUBCUTANEOUS at 05:11

## 2024-12-19 RX ADMIN — METHYLPREDNISOLONE 60 MILLIGRAM(S): 4 TABLET ORAL at 12:11

## 2024-12-19 RX ADMIN — METHYLPREDNISOLONE 60 MILLIGRAM(S): 4 TABLET ORAL at 23:27

## 2024-12-19 RX ADMIN — IPRATROPIUM BROMIDE AND ALBUTEROL SULFATE 3 MILLILITER(S): .5; 2.5 SOLUTION RESPIRATORY (INHALATION) at 15:42

## 2024-12-19 RX ADMIN — IPRATROPIUM BROMIDE AND ALBUTEROL SULFATE 3 MILLILITER(S): .5; 2.5 SOLUTION RESPIRATORY (INHALATION) at 20:13

## 2024-12-19 RX ADMIN — MONTELUKAST SODIUM 10 MILLIGRAM(S): 10 TABLET, FILM COATED ORAL at 12:11

## 2024-12-19 RX ADMIN — METHYLPREDNISOLONE 60 MILLIGRAM(S): 4 TABLET ORAL at 05:10

## 2024-12-19 RX ADMIN — OSELTAMIVIR 75 MILLIGRAM(S): 75 CAPSULE ORAL at 18:18

## 2024-12-19 RX ADMIN — PANTOPRAZOLE 40 MILLIGRAM(S): 40 TABLET, DELAYED RELEASE ORAL at 05:10

## 2024-12-19 RX ADMIN — OSELTAMIVIR 75 MILLIGRAM(S): 75 CAPSULE ORAL at 05:11

## 2024-12-19 NOTE — PROGRESS NOTE ADULT - SUBJECTIVE AND OBJECTIVE BOX
Patient is a 54y old  Female who presents with a chief complaint of Abdominal pain (18 Dec 2024 11:27)    pt seen in icu [  ], reg med floor [ x  ], bed [ x ], chair at bedside [   ], a+o x3 [ x ], lethargic [  ],    nad [ x ]        Allergies    No Known Allergies        Vitals    T(F): 98.8 (12-19-24 @ 04:55), Max: 101.5 (12-18-24 @ 14:30)  HR: 79 (12-19-24 @ 04:55) (79 - 88)  BP: 135/70 (12-19-24 @ 04:55) (119/69 - 135/70)  RR: 18 (12-19-24 @ 04:55) (16 - 18)  SpO2: 94% (12-19-24 @ 04:55) (94% - 96%)  Wt(kg): --  CAPILLARY BLOOD GLUCOSE          Labs                          9.1    7.35  )-----------( 395      ( 19 Dec 2024 05:15 )             29.4       12-19    133[L]  |  101  |  7   ----------------------------<  197[H]  3.9   |  25  |  0.54    Ca    9.3      19 Dec 2024 05:15  Phos  4.0     12-18  Mg     2.1     12-18    TPro  8.1  /  Alb  3.3[L]  /  TBili  0.4  /  DBili  x   /  AST  13  /  ALT  16  /  AlkPhos  68  12-19                Radiology Results      Meds    MEDICATIONS  (STANDING):  albuterol/ipratropium for Nebulization 3 milliLiter(s) Nebulizer every 6 hours  enoxaparin Injectable 40 milliGRAM(s) SubCutaneous every 24 hours  methylPREDNISolone sodium succinate Injectable 60 milliGRAM(s) IV Push every 6 hours  montelukast 10 milliGRAM(s) Oral daily  oseltamivir 75 milliGRAM(s) Oral two times a day  pantoprazole    Tablet 40 milliGRAM(s) Oral before breakfast      MEDICATIONS  (PRN):  acetaminophen     Tablet .. 650 milliGRAM(s) Oral every 6 hours PRN Temp greater or equal to 38C (100.4F), Mild Pain (1 - 3)  guaiFENesin Oral Liquid (Sugar-Free) 100 milliGRAM(s) Oral every 6 hours PRN Cough  ondansetron Injectable 4 milliGRAM(s) IV Push every 8 hours PRN Nausea and/or Vomiting      Physical Exam    Neuro :  no focal deficits  Respiratory: CTA B/L  CV: RRR, S1S2, no murmurs,   Abdominal: Soft, NT, ND +BS,  Extremities: No edema, + peripheral pulses      ASSESSMENT    symptomatic anemia,   omental carcinomatosis likely metastatic disease,   r/o colon ca,   adnexal mass r/o ovarian or tubular malig,   hypokalemia,   influenza b,   h/o  asthma   UC [diagnosed in 2006]         PLAN    s/p transfuse 2 units prbc,   hgb improved appropriately  f/u ca 125, ca19-9, cea, iron studies   gi cons  protonix   surg cons  mri abd pelv   gyn onc cons   supplemented potassium,   cont tamiflu x 2/5 days,   pulm cons   cont bronchodilators   cont singulair   cont current meds         Patient is a 54y old  Female who presents with a chief complaint of Abdominal pain (18 Dec 2024 11:27)    pt seen in icu [  ], reg med floor [ x  ], bed [ x ], chair at bedside [   ], a+o x3 [ x ], lethargic [  ],    nad [ x ]        Allergies    No Known Allergies        Vitals    T(F): 98.8 (12-19-24 @ 04:55), Max: 101.5 (12-18-24 @ 14:30)  HR: 79 (12-19-24 @ 04:55) (79 - 88)  BP: 135/70 (12-19-24 @ 04:55) (119/69 - 135/70)  RR: 18 (12-19-24 @ 04:55) (16 - 18)  SpO2: 94% (12-19-24 @ 04:55) (94% - 96%)  Wt(kg): --  CAPILLARY BLOOD GLUCOSE          Labs                          9.1    7.35  )-----------( 395      ( 19 Dec 2024 05:15 )             29.4       12-19    133[L]  |  101  |  7   ----------------------------<  197[H]  3.9   |  25  |  0.54    Ca    9.3      19 Dec 2024 05:15  Phos  4.0     12-18  Mg     2.1     12-18    TPro  8.1  /  Alb  3.3[L]  /  TBili  0.4  /  DBili  x   /  AST  13  /  ALT  16  /  AlkPhos  68  12-19    Cancer Antigen, GI Ca 19-9 (12.18.24 @ 10:05)   Cancer Antigen, GI Ca 19-9: 527:   Cancer Antigen, 125 (12.18.24 @ 10:05)   Cancer Antigen, 125: 165  Carcinoembryonic Antigen (12.18.24 @ 10:05)   Carcinoembryonic Antigen: 14.0      Iron with Total Binding Capacity in AM (12.18.24 @ 05:18)   Iron - Total Binding Capacity.: 344 ug/dL  % Saturation, Iron: 4 %  Iron Total: 15 ug/dL  Unsaturated Iron Binding Capacity: 329 ug/dL        Radiology Results      Meds    MEDICATIONS  (STANDING):  albuterol/ipratropium for Nebulization 3 milliLiter(s) Nebulizer every 6 hours  enoxaparin Injectable 40 milliGRAM(s) SubCutaneous every 24 hours  methylPREDNISolone sodium succinate Injectable 60 milliGRAM(s) IV Push every 6 hours  montelukast 10 milliGRAM(s) Oral daily  oseltamivir 75 milliGRAM(s) Oral two times a day  pantoprazole    Tablet 40 milliGRAM(s) Oral before breakfast      MEDICATIONS  (PRN):  acetaminophen     Tablet .. 650 milliGRAM(s) Oral every 6 hours PRN Temp greater or equal to 38C (100.4F), Mild Pain (1 - 3)  guaiFENesin Oral Liquid (Sugar-Free) 100 milliGRAM(s) Oral every 6 hours PRN Cough  ondansetron Injectable 4 milliGRAM(s) IV Push every 8 hours PRN Nausea and/or Vomiting      Physical Exam    Neuro :  no focal deficits  Respiratory: CTA B/L  CV: RRR, S1S2, no murmurs,   Abdominal: Soft, NT, ND +BS,  Extremities: No edema, + peripheral pulses      ASSESSMENT    symptomatic anemia,   omental carcinomatosis likely metastatic disease,   r/o colon ca,   adnexal mass r/o ovarian or tubular malig,   hypokalemia,   influenza b,   h/o  asthma   UC [diagnosed in 2006]         PLAN    s/p transfuse 2 units prbc,   hgb improved   ca 125, ca19-9, cea, elevated noted above   iron studies with low iron noted above   gi f/u   pt with Ulcerative colitis exacerbation   r/o colon ca   Solumedrol 60mg q6h  Colonoscopy  protonix   surg f/u   - No acute intervention   - F/U GI for colonoscopy   gyn onc f/u   cystic right adnexal lesion, possibly ovarian or tubular malignancy.   Possible GYN malignancy at R adnexa   f/u mri abd pelv    supplemented potassium,   cont tamiflu x 3/5 days,   pulm f/u    cont bronchodilators   cont singulair   cont Breo Ellipta  cont current meds         Patient is a 54y old  Female who presents with a chief complaint of Abdominal pain (18 Dec 2024 11:27)    pt seen in icu [  ], reg med floor [ x  ], bed [ x ], chair at bedside [   ], a+o x3 [ x ], lethargic [  ],    nad [ x ]        Allergies    No Known Allergies        Vitals    T(F): 98.8 (12-19-24 @ 04:55), Max: 101.5 (12-18-24 @ 14:30)  HR: 79 (12-19-24 @ 04:55) (79 - 88)  BP: 135/70 (12-19-24 @ 04:55) (119/69 - 135/70)  RR: 18 (12-19-24 @ 04:55) (16 - 18)  SpO2: 94% (12-19-24 @ 04:55) (94% - 96%)  Wt(kg): --  CAPILLARY BLOOD GLUCOSE          Labs                          9.1    7.35  )-----------( 395      ( 19 Dec 2024 05:15 )             29.4       12-19    133[L]  |  101  |  7   ----------------------------<  197[H]  3.9   |  25  |  0.54    Ca    9.3      19 Dec 2024 05:15  Phos  4.0     12-18  Mg     2.1     12-18    TPro  8.1  /  Alb  3.3[L]  /  TBili  0.4  /  DBili  x   /  AST  13  /  ALT  16  /  AlkPhos  68  12-19    Cancer Antigen, GI Ca 19-9 (12.18.24 @ 10:05)   Cancer Antigen, GI Ca 19-9: 527:   Cancer Antigen, 125 (12.18.24 @ 10:05)   Cancer Antigen, 125: 165  Carcinoembryonic Antigen (12.18.24 @ 10:05)   Carcinoembryonic Antigen: 14.0      Iron with Total Binding Capacity in AM (12.18.24 @ 05:18)   Iron - Total Binding Capacity.: 344 ug/dL  % Saturation, Iron: 4 %  Iron Total: 15 ug/dL  Unsaturated Iron Binding Capacity: 329 ug/dL        Radiology Results      Meds    MEDICATIONS  (STANDING):  albuterol/ipratropium for Nebulization 3 milliLiter(s) Nebulizer every 6 hours  enoxaparin Injectable 40 milliGRAM(s) SubCutaneous every 24 hours  methylPREDNISolone sodium succinate Injectable 60 milliGRAM(s) IV Push every 6 hours  montelukast 10 milliGRAM(s) Oral daily  oseltamivir 75 milliGRAM(s) Oral two times a day  pantoprazole    Tablet 40 milliGRAM(s) Oral before breakfast      MEDICATIONS  (PRN):  acetaminophen     Tablet .. 650 milliGRAM(s) Oral every 6 hours PRN Temp greater or equal to 38C (100.4F), Mild Pain (1 - 3)  guaiFENesin Oral Liquid (Sugar-Free) 100 milliGRAM(s) Oral every 6 hours PRN Cough  ondansetron Injectable 4 milliGRAM(s) IV Push every 8 hours PRN Nausea and/or Vomiting      Physical Exam    Neuro :  no focal deficits  Respiratory: CTA B/L  CV: RRR, S1S2, no murmurs,   Abdominal: Soft, NT, ND +BS,  Extremities: No edema, + peripheral pulses      ASSESSMENT    symptomatic anemia,   omental carcinomatosis likely metastatic disease,   r/o colon ca,   adnexal mass r/o ovarian or tubular malig,   hypokalemia,   influenza b,   h/o  asthma   UC [diagnosed in 2006]         PLAN    s/p transfuse 2 units prbc,   hgb improved   ca 125, ca19-9, cea, elevated noted above   heme onc cons  iron studies with low iron noted above   gi f/u   pt with Ulcerative colitis exacerbation   r/o colon ca   Solumedrol 60mg q6h  Colonoscopy  protonix   surg f/u   - No acute intervention   - F/U GI for colonoscopy   gyn onc f/u   cystic right adnexal lesion, possibly ovarian or tubular malignancy.   Possible GYN malignancy at R adnexa   f/u mri abd pelv    supplemented potassium,   cont tamiflu x 3/5 days,   pulm f/u    cont bronchodilators   cont singulair   cont Breo Ellipta  cont current meds

## 2024-12-19 NOTE — PROGRESS NOTE ADULT - SUBJECTIVE AND OBJECTIVE BOX
Patient is a 54y old  Female who presents with a chief complaint of Abdominal pain (18 Dec 2024 11:27)  Patient is awake, alert, lying in bed in NAD.    INTERVAL HPI/OVERNIGHT EVENTS:      VITAL SIGNS:  T(F): 98.8 (12-19-24 @ 04:55)  HR: 79 (12-19-24 @ 04:55)  BP: 135/70 (12-19-24 @ 04:55)  RR: 18 (12-19-24 @ 04:55)  SpO2: 94% (12-19-24 @ 04:55)  Wt(kg): --  I&O's Detail          REVIEW OF SYSTEMS:    CONSTITUTIONAL:  No fevers, chills, sweats    HEENT:  Eyes:  No diplopia or blurred vision. ENT:  No earache, sore throat or runny nose.    CARDIOVASCULAR:  No pressure, squeezing, tightness, or heaviness about the chest; no palpitations.    RESPIRATORY:  Per HPI    GASTROINTESTINAL:  No abdominal pain, nausea, vomiting or diarrhea.    GENITOURINARY:  No dysuria, frequency or urgency.    NEUROLOGIC:  No paresthesias, fasciculations, seizures or weakness.    PSYCHIATRIC:  No disorder of thought or mood.      PHYSICAL EXAM:    Constitutional: Well developed and nourished  Eyes:Perrla  ENMT: normal  Neck:supple  Respiratory: good air entry  Cardiovascular: S1 S2 regular  Gastrointestinal: Soft, Non tender  Extremities: No edema  Vascular:normal  Neurological:Awake, alert,Ox3  Musculoskeletal:Normal      MEDICATIONS  (STANDING):  albuterol/ipratropium for Nebulization 3 milliLiter(s) Nebulizer every 6 hours  enoxaparin Injectable 40 milliGRAM(s) SubCutaneous every 24 hours  methylPREDNISolone sodium succinate Injectable 60 milliGRAM(s) IV Push every 6 hours  montelukast 10 milliGRAM(s) Oral daily  oseltamivir 75 milliGRAM(s) Oral two times a day  pantoprazole    Tablet 40 milliGRAM(s) Oral before breakfast    MEDICATIONS  (PRN):  acetaminophen     Tablet .. 650 milliGRAM(s) Oral every 6 hours PRN Temp greater or equal to 38C (100.4F), Mild Pain (1 - 3)  guaiFENesin Oral Liquid (Sugar-Free) 100 milliGRAM(s) Oral every 6 hours PRN Cough  ondansetron Injectable 4 milliGRAM(s) IV Push every 8 hours PRN Nausea and/or Vomiting      Allergies    No Known Allergies    Intolerances        LABS:                        9.1    7.35  )-----------( 395      ( 19 Dec 2024 05:15 )             29.4     12-19    133[L]  |  101  |  7   ----------------------------<  197[H]  3.9   |  25  |  0.54    Ca    9.3      19 Dec 2024 05:15  Phos  4.0     12-18  Mg     2.1     12-18    TPro  8.1  /  Alb  3.3[L]  /  TBili  0.4  /  DBili  x   /  AST  13  /  ALT  16  /  AlkPhos  68  12-19    PT/INR - ( 17 Dec 2024 14:43 )   PT: 12.6 sec;   INR: 1.09 ratio         PTT - ( 17 Dec 2024 14:43 )  PTT:26.6 sec  Urinalysis Basic - ( 19 Dec 2024 05:15 )    Color: x / Appearance: x / SG: x / pH: x  Gluc: 197 mg/dL / Ketone: x  / Bili: x / Urobili: x   Blood: x / Protein: x / Nitrite: x   Leuk Esterase: x / RBC: x / WBC x   Sq Epi: x / Non Sq Epi: x / Bacteria: x            CAPILLARY BLOOD GLUCOSE            RADIOLOGY & ADDITIONAL TESTS:    CXR:  < from: CT Abdomen and Pelvis w/ IV Cont (12.17.24 @ 15:14) >  IMPRESSION:  Ascites and omental carcinomatosis consistent with metastatic disease.    7.0 x 2.6 x 3.2 cm cystic right adnexal lesion, possibly ovarian or   tubular malignancy. Recommend pelvic MRI.    Irregular thickening of the sigmoid colon also concerning for malignancy.   Recommend colonoscopy.        --- End of Report ---    < end of copied text >    Ct scan chest:    ekg;    echo: Patient is a 54y old  Female who presents with a chief complaint of Abdominal pain (18 Dec 2024 11:27)  Patient is awake, alert, out of bed in NAD. No cough or Sob    INTERVAL HPI/OVERNIGHT EVENTS:      VITAL SIGNS:  T(F): 98.8 (12-19-24 @ 04:55)  HR: 79 (12-19-24 @ 04:55)  BP: 135/70 (12-19-24 @ 04:55)  RR: 18 (12-19-24 @ 04:55)  SpO2: 94% (12-19-24 @ 04:55)  Wt(kg): --  I&O's Detail          REVIEW OF SYSTEMS:    CONSTITUTIONAL:  No fevers, chills, sweats    HEENT:  Eyes:  No diplopia or blurred vision. ENT:  No earache, sore throat or runny nose.    CARDIOVASCULAR:  No pressure, squeezing, tightness, or heaviness about the chest; no palpitations.    RESPIRATORY:  Per HPI    GASTROINTESTINAL:  No abdominal pain, nausea, vomiting or diarrhea.    GENITOURINARY:  No dysuria, frequency or urgency.    NEUROLOGIC:  No paresthesias, fasciculations, seizures or weakness.    PSYCHIATRIC:  No disorder of thought or mood.      PHYSICAL EXAM:    Constitutional: Well developed and nourished  Eyes:Perrla  ENMT: normal  Neck:supple  Respiratory: good air entry  Cardiovascular: S1 S2 regular  Gastrointestinal: Soft, Non tender  Extremities: No edema  Vascular:normal  Neurological:Awake, alert,Ox3  Musculoskeletal:Normal      MEDICATIONS  (STANDING):  albuterol/ipratropium for Nebulization 3 milliLiter(s) Nebulizer every 6 hours  enoxaparin Injectable 40 milliGRAM(s) SubCutaneous every 24 hours  methylPREDNISolone sodium succinate Injectable 60 milliGRAM(s) IV Push every 6 hours  montelukast 10 milliGRAM(s) Oral daily  oseltamivir 75 milliGRAM(s) Oral two times a day  pantoprazole    Tablet 40 milliGRAM(s) Oral before breakfast    MEDICATIONS  (PRN):  acetaminophen     Tablet .. 650 milliGRAM(s) Oral every 6 hours PRN Temp greater or equal to 38C (100.4F), Mild Pain (1 - 3)  guaiFENesin Oral Liquid (Sugar-Free) 100 milliGRAM(s) Oral every 6 hours PRN Cough  ondansetron Injectable 4 milliGRAM(s) IV Push every 8 hours PRN Nausea and/or Vomiting      Allergies    No Known Allergies    Intolerances        LABS:                        9.1    7.35  )-----------( 395      ( 19 Dec 2024 05:15 )             29.4     12-19    133[L]  |  101  |  7   ----------------------------<  197[H]  3.9   |  25  |  0.54    Ca    9.3      19 Dec 2024 05:15  Phos  4.0     12-18  Mg     2.1     12-18    TPro  8.1  /  Alb  3.3[L]  /  TBili  0.4  /  DBili  x   /  AST  13  /  ALT  16  /  AlkPhos  68  12-19    PT/INR - ( 17 Dec 2024 14:43 )   PT: 12.6 sec;   INR: 1.09 ratio         PTT - ( 17 Dec 2024 14:43 )  PTT:26.6 sec  Urinalysis Basic - ( 19 Dec 2024 05:15 )    Color: x / Appearance: x / SG: x / pH: x  Gluc: 197 mg/dL / Ketone: x  / Bili: x / Urobili: x   Blood: x / Protein: x / Nitrite: x   Leuk Esterase: x / RBC: x / WBC x   Sq Epi: x / Non Sq Epi: x / Bacteria: x            CAPILLARY BLOOD GLUCOSE            RADIOLOGY & ADDITIONAL TESTS:    CXR:  < from: CT Abdomen and Pelvis w/ IV Cont (12.17.24 @ 15:14) >  IMPRESSION:  Ascites and omental carcinomatosis consistent with metastatic disease.    7.0 x 2.6 x 3.2 cm cystic right adnexal lesion, possibly ovarian or   tubular malignancy. Recommend pelvic MRI.    Irregular thickening of the sigmoid colon also concerning for malignancy.   Recommend colonoscopy.        --- End of Report ---    < end of copied text >    Ct scan chest:    ekg;    echo:

## 2024-12-19 NOTE — PROGRESS NOTE ADULT - ASSESSMENT
54 year old, F, from home, w/ PMH Asthma and UC [diagnosed in 2006].  Presented to the ED for abdominal pain x 2 weeks, headache, nausea, and vomiting x 1 week and dizziness x 1 day. Endorses non-radiating, intermittent sharp lower abdominal pain. Also complained on a generalized headache, and nausea and nonbloody, nonbilious vomiting with decreased PO intake. Patient noted to have bloody stool for past  2 weeks and diarrhea that started yesterday. Patient states she feels very weak. Most recent colonoscopy was in 2022, which was unremarkable. Patient saw PCP Dr. Jenae Wiggins recently who recommended a repeat colonoscopy which was scheduled for next month.  Admitted for Symptomatic anemia and malignancy [place].    CT A/P shows Ascites and omental carcinomatosis consistent with metastatic disease.                               7.0 x 2.6 x 3.2 cm cystic right adnexal lesion, possibly ovarian or tubular malignancy.                               Irregular thickening of the sigmoid colon also concerning for malignancy.   12/18 febrile 100.5- resolved   12/19 pending MR A/P, Bcx   GI following rec's Colonoscopy f/u date   GYN following recs MRI A/P for Adnexal mass, and  Follow up CEA, CA 19-9,    Surg following and rec's No surg intervention   Heme/onc following s/p 2U PRBC's rec's transfusion and maintain hb<7.   Pilm following Hx asthma + Influenza B. rec's to cont Tamiflu po BID for 5 days total.    54 year old, F, from home, w/ PMH Asthma and UC [diagnosed in 2006].  Presented to the ED for abdominal pain x 2 weeks, headache, nausea, and vomiting x 1 week and dizziness x 1 day. Endorses non-radiating, intermittent sharp lower abdominal pain. Also complained on a generalized headache, and nausea and nonbloody, nonbilious vomiting with decreased PO intake. Patient noted to have bloody stool for past  2 weeks and diarrhea that started yesterday. Patient states she feels very weak. Most recent colonoscopy was in 2022, which was unremarkable. Patient saw PCP Dr. Jenae Wiggins recently who recommended a repeat colonoscopy which was scheduled for next month.  Admitted for Symptomatic anemia and malignancy ventura.    CT A/P shows Ascites and omental carcinomatosis consistent with metastatic disease.                               7.0 x 2.6 x 3.2 cm cystic right adnexal lesion, possibly ovarian or tubular malignancy.                               Irregular thickening of the sigmoid colon also concerning for malignancy.   12/18 febrile 100.5- resolved   12/19 pending MR A/P, Bcx   GI following rec's Colonoscopy fonce pt is off isolation   GYN following recs MRI A/P for Adnexal mass, and  Follow up CEA, CA 19-9,    Surg following and rec's No surg intervention   Heme/onc following s/p 2U PRBC's rec's transfusion and maintain hb<7.   Pilm following Hx asthma + Influenza B. rec's to cont Tamiflu po BID for 5 days total.

## 2024-12-19 NOTE — PROGRESS NOTE ADULT - ASSESSMENT
1. Abdominal pain  2. Bloody diarrhea  3. Iron deficiency anemia  4. Ulcerative colitis exacerbation  5. Ascites  6. Peritoneal carcinomatosis  7. Sigmoid colon wall thickening   8. R/o colon cancer  9. R/o ovarian cancer    Suggestions:    1. Monitor H/H  2. Transfuse PRBC as needed  3. Check stool for culture and PCR  4. Solumedrol 60mg q6h  5. Colonoscopy  6. GYN evaluation  7. Check CEA and   8. Protonix 40mg daily  9. Avoid NSAID  10. Oncology evaluation  11. Diet as tolerated  12. Iron supplement IV  13. DVT prophylaxis

## 2024-12-19 NOTE — PROGRESS NOTE ADULT - PROBLEM SELECTOR PLAN 4
- Temp noted, likely 2/2 malignancy vs. UC.  Bld cx in AM-f/u results.   - Per GI, Solumedrol 60mg q6h  - See above

## 2024-12-19 NOTE — PROGRESS NOTE ADULT - PROBLEM SELECTOR PLAN 3
MRI abdomen and pelvis  surgery follow up  Gyn onco eval. MRI abdomen and pelvis  surgery follow up  Gyn onco Follow up.

## 2024-12-19 NOTE — PROGRESS NOTE ADULT - SUBJECTIVE AND OBJECTIVE BOX
NP Note discussed with  Primary Attending    Patient is a 54y old  Female who presents with a chief complaint of Abdominal pain (18 Dec 2024 11:27)      INTERVAL HPI/OVERNIGHT EVENTS: no new complaints    MEDICATIONS  (STANDING):  albuterol/ipratropium for Nebulization 3 milliLiter(s) Nebulizer every 6 hours  enoxaparin Injectable 40 milliGRAM(s) SubCutaneous every 24 hours  methylPREDNISolone sodium succinate Injectable 60 milliGRAM(s) IV Push every 6 hours  montelukast 10 milliGRAM(s) Oral daily  oseltamivir 75 milliGRAM(s) Oral two times a day  pantoprazole    Tablet 40 milliGRAM(s) Oral before breakfast    MEDICATIONS  (PRN):  acetaminophen     Tablet .. 650 milliGRAM(s) Oral every 6 hours PRN Temp greater or equal to 38C (100.4F), Mild Pain (1 - 3)  guaiFENesin Oral Liquid (Sugar-Free) 100 milliGRAM(s) Oral every 6 hours PRN Cough  ondansetron Injectable 4 milliGRAM(s) IV Push every 8 hours PRN Nausea and/or Vomiting      __________________________________________________  REVIEW OF SYSTEMS:    CONSTITUTIONAL: No fever,   EYES: no acute visual disturbances  NECK: No pain or stiffness  RESPIRATORY: No cough; No shortness of breath  CARDIOVASCULAR: No chest pain, no palpitations  GASTROINTESTINAL: No pain. No nausea or vomiting; No diarrhea   NEUROLOGICAL: No headache or numbness, no tremors  MUSCULOSKELETAL: No joint pain, no muscle pain  GENITOURINARY: no dysuria, no frequency, no hesitancy  PSYCHIATRY: no depression , no anxiety  ALL OTHER  ROS negative        Vital Signs Last 24 Hrs  T(C): 37.1 (19 Dec 2024 04:55), Max: 38.6 (18 Dec 2024 14:30)  T(F): 98.8 (19 Dec 2024 04:55), Max: 101.5 (18 Dec 2024 14:30)  HR: 79 (19 Dec 2024 04:55) (79 - 88)  BP: 135/70 (19 Dec 2024 04:55) (119/69 - 135/70)  BP(mean): --  RR: 18 (19 Dec 2024 04:55) (16 - 18)  SpO2: 94% (19 Dec 2024 04:55) (94% - 96%)    Parameters below as of 19 Dec 2024 04:55  Patient On (Oxygen Delivery Method): room air        ________________________________________________  PHYSICAL EXAM:  GENERAL: NAD  HEENT: Normocephalic;  conjunctivae and sclerae clear; moist mucous membranes;   NECK : supple  CHEST/LUNG: Clear to auscultation bilaterally with good air entry   HEART: S1 S2  regular; no murmurs, gallops or rubs  ABDOMEN: Soft, Nontender, Nondistended; Bowel sounds present  EXTREMITIES: no cyanosis; no edema; no calf tenderness  SKIN: warm and dry; no rash  NERVOUS SYSTEM:  Awake and alert; Oriented  to place, person and time ; no new deficits    _________________________________________________  LABS:                        9.1    7.35  )-----------( 395      ( 19 Dec 2024 05:15 )             29.4     12-19    133[L]  |  101  |  7   ----------------------------<  197[H]  3.9   |  25  |  0.54    Ca    9.3      19 Dec 2024 05:15  Phos  4.0     12-18  Mg     2.1     12-18    TPro  8.1  /  Alb  3.3[L]  /  TBili  0.4  /  DBili  x   /  AST  13  /  ALT  16  /  AlkPhos  68  12-19    PT/INR - ( 17 Dec 2024 14:43 )   PT: 12.6 sec;   INR: 1.09 ratio         PTT - ( 17 Dec 2024 14:43 )  PTT:26.6 sec  Urinalysis Basic - ( 19 Dec 2024 05:15 )    Color: x / Appearance: x / SG: x / pH: x  Gluc: 197 mg/dL / Ketone: x  / Bili: x / Urobili: x   Blood: x / Protein: x / Nitrite: x   Leuk Esterase: x / RBC: x / WBC x   Sq Epi: x / Non Sq Epi: x / Bacteria: x      CAPILLARY BLOOD GLUCOSE            RADIOLOGY & ADDITIONAL TESTS:  < from: CT Abdomen and Pelvis w/ IV Cont (12.17.24 @ 15:14) >    ACC: [id number] EXAM:  CT ABDOMEN AND PELVIS IC   ORDERED BY: JEFFREY HERNÁNDEZ     PROCEDURE DATE:  12/17/2024          INTERPRETATION:  CLINICAL INFORMATION: 54 years  Female with lower   abdominal pain, diarrhea/ r/o colitis.    COMPARISON: CT abdomen and pelvis 6/27/2021    CONTRAST/COMPLICATIONS:  IV Contrast: Omnipaque 350  90 cc administered   10 cc discarded  Oral Contrast: NONE  .    PROCEDURE:  CT of the Abdomen and Pelvis was performed.  Sagittal and coronal reformats were performed.    FINDINGS:  LOWER CHEST: Stable 1 cm right middle lobe bulla.    LIVER: Within normal limits.  BILE DUCTS: Normal caliber.  GALLBLADDER: Within normal limits.  SPLEEN: Within normal limits.  PANCREAS: Within normal limits.  ADRENALS: Within normal limits.  KIDNEYS/URETERS: Within normal limits.    BLADDER: Within normal limits.  REPRODUCTIVE ORGANS: Unremarkable uterus. 7.0 x 2.6 x 3.2 cm tubular   collection in the right adnexa.    BOWEL: No bowel obstruction. Appendix  . Irregular nonspecific thickening   of the sigmoid colon.  PERITONEUM/RETROPERITONEUM: Small ascites is new. Extensive nodular soft   tissue infiltration throughout the omentum consistent with carcinomatosis.  VESSELS: Within normal limits.  LYMPH NODES: No lymphadenopathy.  ABDOMINAL WALL: Within normal limits.  BONES: Within normal limits.    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    7.0 x 2.6 x 3.2 cm cystic right adnexal lesion, possibly ovarian or   tubular malignancy. Recommend pelvic MRI.    Irregular thickening of the sigmoid colon also concerning for malignancy.   Recommend colonoscopy.        --- End of Report ---            ROMEL SAAVEDRA MD; Attending Radiologist  This document has been electronically signed. Dec 17 2024  3:41PM    < end of copied text >    Imaging Personally Reviewed:  YES  Consultant(s) Notes Reviewed:   YES    Plan of care was discussed with patient and /or primary care giver; all questions and concerns were addressed and care was aligned with patient's wishes.

## 2024-12-19 NOTE — PROGRESS NOTE ADULT - PROBLEM SELECTOR PLAN 2
cont bronchodilators PRN  Breo Ellipta  cont singulair  Oxygen supp PRN. cont bronchodilators PRN  Breo Ellipta one inh po daily  cont singulair 10 mgs po Qhs  Oxygen supp PRN.

## 2024-12-19 NOTE — CONSULT NOTE ADULT - CONSULT REASON
Possible colon mass
peritoneal carcinomatosis
cystic R adnexal lesion
abdominal pain, bloody diarrhea and sever anemia
Asthma

## 2024-12-19 NOTE — CONSULT NOTE ADULT - SUBJECTIVE AND OBJECTIVE BOX
Reason for consult:    HPI:  54 F, from home, PMHx asthma and UC [diagnosed in 2006] presented to the ED for abdominal pain x 2 weeks, headache, nausea, and vomiting for 1 week and dizziness for 1 day. Endorses non-radiating, intermittent sharp lower abdominal pain. Also complained on a generalized headache, and nausea and nonbloody, nonbilious vomiting with decreased PO intake. Patient noted to have bloody stool for past 2 weeks and diarrhea that started yesterday. Patient states she feels very weak. Most recent colonoscopy was in 2022, which was unremarkable. Patient saw PCP Dr. Jenae Wiggins recently who recommended a repeat colonoscopy which was scheduled for next month. Denies fever, chills, CP. SOB, palpitations, constipation or urinary Sx.  (17 Dec 2024 18:48)      PAST MEDICAL & SURGICAL HISTORY:  Asthma      Ulcerative colitis      No significant past surgical history          FAMILY HISTORY:  No pertinent family history in first degree relatives        Alochol: Denied  Smoking: Nonsmoker  Drug Use: Denied  Marital Status:         Allergies    No Known Allergies    Intolerances        MEDICATIONS  (STANDING):  albuterol/ipratropium for Nebulization 3 milliLiter(s) Nebulizer every 6 hours  enoxaparin Injectable 40 milliGRAM(s) SubCutaneous every 24 hours  methylPREDNISolone sodium succinate Injectable 60 milliGRAM(s) IV Push every 6 hours  montelukast 10 milliGRAM(s) Oral daily  oseltamivir 75 milliGRAM(s) Oral two times a day  pantoprazole    Tablet 40 milliGRAM(s) Oral before breakfast    MEDICATIONS  (PRN):  acetaminophen     Tablet .. 650 milliGRAM(s) Oral every 6 hours PRN Temp greater or equal to 38C (100.4F), Mild Pain (1 - 3)  guaiFENesin Oral Liquid (Sugar-Free) 100 milliGRAM(s) Oral every 6 hours PRN Cough  ondansetron Injectable 4 milliGRAM(s) IV Push every 8 hours PRN Nausea and/or Vomiting      ROS  No fever, sweats, chills  No epistaxis, HA, sore throat  No CP, SOB, cough, sputum  No n/v/d, abd pain, melena, hematochezia  No edema  No rash  No anxiety  No back pain, joint pain  No bleeding, bruising  No dysuria, hematuria    T(C): 37.1 (12-19-24 @ 04:55), Max: 38.6 (12-18-24 @ 14:30)  HR: 79 (12-19-24 @ 04:55) (79 - 88)  BP: 135/70 (12-19-24 @ 04:55) (119/69 - 135/70)  RR: 18 (12-19-24 @ 04:55) (16 - 18)  SpO2: 94% (12-19-24 @ 04:55) (94% - 96%)  Wt(kg): --    PE  NAD  Awake, alert  Anicteric, MMM  RRR  CTAB  Abd soft, NT, ND  No c/c/e  No rash grossly  FROM                          9.1    7.35  )-----------( 395      ( 19 Dec 2024 05:15 )             29.4       12-19    133[L]  |  101  |  7   ----------------------------<  197[H]  3.9   |  25  |  0.54    Ca    9.3      19 Dec 2024 05:15  Phos  4.0     12-18  Mg     2.1     12-18    TPro  8.1  /  Alb  3.3[L]  /  TBili  0.4  /  DBili  x   /  AST  13  /  ALT  16  /  AlkPhos  68  12-19

## 2024-12-19 NOTE — PROGRESS NOTE ADULT - SUBJECTIVE AND OBJECTIVE BOX
INTERVAL HPI/OVERNIGHT EVENTS:  Pt seen/examined at bedside. No acute complaints. Last BM 2 days ago. No abdominal pain.     Vital Signs Last 24 Hrs  T(C): 37.1 (19 Dec 2024 04:55), Max: 38.6 (18 Dec 2024 14:30)  T(F): 98.8 (19 Dec 2024 04:55), Max: 101.5 (18 Dec 2024 14:30)  HR: 79 (19 Dec 2024 04:55) (79 - 88)  BP: 135/70 (19 Dec 2024 04:55) (119/69 - 135/70)  BP(mean): --  RR: 18 (19 Dec 2024 04:55) (16 - 18)  SpO2: 94% (19 Dec 2024 04:55) (94% - 96%)    Parameters below as of 19 Dec 2024 04:55  Patient On (Oxygen Delivery Method): room air      I&O's Detail      Medications:  oseltamivir 75 milliGRAM(s) Oral two times a day  pantoprazole    Tablet 40 milliGRAM(s) Oral before breakfast      Physical Exam:  General: AAOx3, No acute distress  HEENT: NC/AT, trachea midline  Respiratory: Nonlabored breathing, equal chest rise b/l   Abdomen: soft,  nondistended, nontender, no rebound tenderness, no guarding, no palpable masses        Labs:                        9.1    7.35  )-----------( 395      ( 19 Dec 2024 05:15 )             29.4     12-19    133[L]  |  101  |  7   ----------------------------<  197[H]  3.9   |  25  |  0.54    Ca    9.3      19 Dec 2024 05:15  Phos  4.0     12-18  Mg     2.1     12-18    TPro  8.1  /  Alb  3.3[L]  /  TBili  0.4  /  DBili  x   /  AST  13  /  ALT  16  /  AlkPhos  68  12-19    PT/INR - ( 17 Dec 2024 14:43 )   PT: 12.6 sec;   INR: 1.09 ratio         PTT - ( 17 Dec 2024 14:43 )  PTT:26.6 sec

## 2024-12-19 NOTE — PROGRESS NOTE ADULT - PROBLEM SELECTOR PLAN 1
- P/w HA & dizziness  - Symptomatic Hgb 5.3. S/p 2U PRBC's hgb 9.1  - Per Attending monitor CBC daily  - S/p TIBC wnl  - Ferritin 8  GI Dr Lugo following -f/u colonoscopy date - P/w HA & dizziness  - Symptomatic Hgb 5.3. S/p 2U PRBC's hgb 9.1  - Per Attending monitor CBC daily  - S/p TIBC wnl  - Ferritin 8  GI Dr Lugo following -colonoscopy when pt is off isolation

## 2024-12-19 NOTE — PROGRESS NOTE ADULT - PROBLEM SELECTOR PLAN 3
H/o UC, dx 2006, Reported [place] 2022, unremarkable  - P/w abdominal pain & bloody stool x 2 weeks  - CTAP showed irregular thickening of sigmoid colon also concerning for malignancy  - CEA, CA 19-9, & 125 pending-f/u results   - Rest above   - GI-Dr. Mcwilliams

## 2024-12-19 NOTE — PROGRESS NOTE ADULT - ASSESSMENT
54 year old, F, from home, w/ PMH Asthma and UC [diagnosed in 2006] admitted for symptomatic anemia and malignancy [place]; found to have ovarian cancer with дмитрий.   CTAP showed a 7.0 x 2.6 x 3.2 cm cystic R adnexal lesion, possibly ovarian or tubular malignancy, ascites and omental carcinomatosis c/w metastatic disease   Hg 9.1; s/p 2PRBC    - No acute intervention   - appreciate GI/GYN recs  - f/u tumor markers   - f/u MRI results   - Heme/onc consult  - Transfuse PRN

## 2024-12-19 NOTE — PROGRESS NOTE ADULT - PROBLEM SELECTOR PLAN 10
Pt from home   f/u CBC  f/u MR A/P   f/u Bcx Pt from home   colonoscopy with GI when pt is off isolation   f/u CBC  f/u MR A/P   f/u Bcx

## 2024-12-19 NOTE — PROGRESS NOTE ADULT - PROBLEM SELECTOR PLAN 2
- P/w Abdominal pain x 2 weeks, nausea and vomiting  - CTAP showed a 7.0 x 2.6 x 3.2 cm cystic R adnexal lesion, possibly ovarian or tubular malignancy, ascites and omental carcinomatosis c/w metastatic disease  - MR A/P pending-f/u results.  MR Safety form sent to MR Dept.   - GYN following

## 2024-12-19 NOTE — PROGRESS NOTE ADULT - PROBLEM SELECTOR PLAN 4
MRI abdomen and pelvis  surgery follow up.  Gastro eval. MRI abdomen and pelvis  surgery follow up.  Gastro Follow up

## 2024-12-19 NOTE — PROGRESS NOTE ADULT - SUBJECTIVE AND OBJECTIVE BOX
[   ] ICU                                          [   ] CCU                                      [ X  ] Medical Floor    Patient is a 54 year old female with abdominal pain. GI consulted to evaluate.          54 year old female from home, with past medical history significant for asthma and Ulcerative Colitis presented to the emergency room with 2 weeks history of progressively worsening intermittent 8/10 intensity non radiating sharp lower abdominal pain associated with bloody watery diarrhea associated with nausea, non bloody vomiting, headache and loss of appetite.      Patient appears comfortable. No new complaints reported, No nausea, vomiting, hematemesis, hematochezia, melena, fever, chills, chest pain, SOB, cough or diarrhea reported.       Prior Colonoscopy:  2022      PAST MEDICAL HISTORY    Asthma    Ulcerative colitis        PAST SURGICAL HISTORY    No significant past surgical history reported        Allergies    No Known Allergies    Intolerances  None         SOCIAL HISTORY  Advanced Directives:       [ X ] Full Code       [  ] DNR  Marital Status:         [  ] M      [  ] S      [  ] D       [  ] W  Children:       [ X ] Yes      [  ] No  Occupation:        [  ] Employed       [X  ] Unemployed       [  ] Retired  Diet:       [ X ] Regular       [  ] PEG feeding          [  ] NG tube feeding  Drug Use:           [X  ] No          [  ] Yes  Alcohol:           [X  ] No             [  ] Yes (socially)         [  ] Yes (chronic)  Tobacco:           [  ] Yes           [ X ] No      FAMILY HISTORY  [ X ] Heart Disease            [ X ] Diabetes             [ X ] HTN             [  ] Colon Cancer             [  ] Stomach Cancer              [  ] Pancreatic Cancer      VITALS   Vital Signs Last 24 Hrs  T(C): 36.9 (12-19-24 @ 14:05), Max: 38.6 (12-18-24 @ 14:30)  T(F): 98.5 (12-19-24 @ 14:05), Max: 101.5 (12-18-24 @ 14:30)  HR: 78 (12-19-24 @ 14:05) (78 - 81)  BP: 117/74 (12-19-24 @ 14:05) (117/74 - 135/70)   RR: 17 (12-19-24 @ 14:05) (16 - 18)  SpO2: 98% (12-19-24 @ 14:05) (94% - 98%)      MEDICATIONS  (STANDING):  albuterol/ipratropium for Nebulization 3 milliLiter(s) Nebulizer every 6 hours  enoxaparin Injectable 40 milliGRAM(s) SubCutaneous every 24 hours  methylPREDNISolone sodium succinate Injectable 60 milliGRAM(s) IV Push every 6 hours  montelukast 10 milliGRAM(s) Oral daily  oseltamivir 75 milliGRAM(s) Oral two times a day  pantoprazole    Tablet 40 milliGRAM(s) Oral before breakfast    MEDICATIONS  (PRN):  acetaminophen     Tablet .. 650 milliGRAM(s) Oral every 6 hours PRN Temp greater or equal to 38C (100.4F), Mild Pain (1 - 3)  guaiFENesin Oral Liquid (Sugar-Free) 100 milliGRAM(s) Oral every 6 hours PRN Cough  ondansetron Injectable 4 milliGRAM(s) IV Push every 8 hours PRN Nausea and/or Vomiting                            9.1    7.35  )-----------( 395      ( 19 Dec 2024 05:15 )             29.4       12-19    133[L]  |  101  |  7   ----------------------------<  197[H]  3.9   |  25  |  0.54    Ca    9.3      19 Dec 2024 05:15  Phos  4.0     12-18  Mg     2.1     12-18    TPro  8.1  /  Alb  3.3[L]  /  TBili  0.4  /  DBili  x   /  AST  13  /  ALT  16  /  AlkPhos  68  12-19      PT/INR - ( 17 Dec 2024 14:43 )   PT: 12.6 sec;   INR: 1.09 ratio         PTT - ( 17 Dec 2024 14:43 )  PTT:26.6 sec

## 2024-12-19 NOTE — CONSULT NOTE ADULT - ASSESSMENT
Ms Jha is a pleasant 54 year old female with history of UC, admitted for abdominal pain and influenza B, found to have multiple omental carcinomatosis, right adnexal mass and sigmoid colon thickening    1. Omental carcinomatosis  ·	This is highly suspicious for Gynecologic malignancy ie ovarian cancer with large right adnexal mass  ·	 165, CEA 14,  527.    ·	Awaiting MRI for further characterization  ·	Patient needs a CT chest to complete workup.  ·	Patient also found to have sigmoid colon thickening concerning for possible colon cancer especially with her history of UC.  ·	Will need a colonoscopy by GI  ·	Gyn onc consult  ·	Also would need a biopsy of the omental nodule for tissue diagnosis as this could be separate from the sigmoid colon thickening  ·	Will continue to monitor.     2. Anemia  ·	Hb improved from 5.3 to 9.1 from PRBC transfusions.  ·	possibly associated with GI bleed from the UC and sigmoid colon thickening  ·	follow GI recs  ·	daily cbc  ·	transfusion for hb<7.     Will continue to follow

## 2024-12-20 LAB
ALBUMIN SERPL ELPH-MCNC: 3.2 G/DL — LOW (ref 3.5–5)
ALP SERPL-CCNC: 63 U/L — SIGNIFICANT CHANGE UP (ref 40–120)
ALT FLD-CCNC: 14 U/L DA — SIGNIFICANT CHANGE UP (ref 10–60)
ANION GAP SERPL CALC-SCNC: 8 MMOL/L — SIGNIFICANT CHANGE UP (ref 5–17)
AST SERPL-CCNC: 11 U/L — SIGNIFICANT CHANGE UP (ref 10–40)
BILIRUB SERPL-MCNC: 0.3 MG/DL — SIGNIFICANT CHANGE UP (ref 0.2–1.2)
BUN SERPL-MCNC: 14 MG/DL — SIGNIFICANT CHANGE UP (ref 7–18)
CALCIUM SERPL-MCNC: 8.8 MG/DL — SIGNIFICANT CHANGE UP (ref 8.4–10.5)
CHLORIDE SERPL-SCNC: 104 MMOL/L — SIGNIFICANT CHANGE UP (ref 96–108)
CO2 SERPL-SCNC: 25 MMOL/L — SIGNIFICANT CHANGE UP (ref 22–31)
CREAT SERPL-MCNC: 0.56 MG/DL — SIGNIFICANT CHANGE UP (ref 0.5–1.3)
EGFR: 108 ML/MIN/1.73M2 — SIGNIFICANT CHANGE UP
GLUCOSE SERPL-MCNC: 180 MG/DL — HIGH (ref 70–99)
HCT VFR BLD CALC: 29.9 % — LOW (ref 34.5–45)
HGB BLD-MCNC: 9 G/DL — LOW (ref 11.5–15.5)
MAGNESIUM SERPL-MCNC: 2.5 MG/DL — SIGNIFICANT CHANGE UP (ref 1.6–2.6)
MCHC RBC-ENTMCNC: 20.1 PG — LOW (ref 27–34)
MCHC RBC-ENTMCNC: 30.1 G/DL — LOW (ref 32–36)
MCV RBC AUTO: 66.9 FL — LOW (ref 80–100)
NRBC # BLD: 0 /100 WBCS — SIGNIFICANT CHANGE UP (ref 0–0)
PHOSPHATE SERPL-MCNC: 4.2 MG/DL — SIGNIFICANT CHANGE UP (ref 2.5–4.5)
PLATELET # BLD AUTO: 357 K/UL — SIGNIFICANT CHANGE UP (ref 150–400)
POTASSIUM SERPL-MCNC: 3.6 MMOL/L — SIGNIFICANT CHANGE UP (ref 3.5–5.3)
POTASSIUM SERPL-SCNC: 3.6 MMOL/L — SIGNIFICANT CHANGE UP (ref 3.5–5.3)
PROT SERPL-MCNC: 7.8 G/DL — SIGNIFICANT CHANGE UP (ref 6–8.3)
RBC # BLD: 4.47 M/UL — SIGNIFICANT CHANGE UP (ref 3.8–5.2)
RBC # FLD: 25.9 % — HIGH (ref 10.3–14.5)
SODIUM SERPL-SCNC: 137 MMOL/L — SIGNIFICANT CHANGE UP (ref 135–145)
WBC # BLD: 8.21 K/UL — SIGNIFICANT CHANGE UP (ref 3.8–10.5)
WBC # FLD AUTO: 8.21 K/UL — SIGNIFICANT CHANGE UP (ref 3.8–10.5)

## 2024-12-20 PROCEDURE — 99222 1ST HOSP IP/OBS MODERATE 55: CPT

## 2024-12-20 RX ADMIN — IPRATROPIUM BROMIDE AND ALBUTEROL SULFATE 3 MILLILITER(S): .5; 2.5 SOLUTION RESPIRATORY (INHALATION) at 15:57

## 2024-12-20 RX ADMIN — METHYLPREDNISOLONE 60 MILLIGRAM(S): 4 TABLET ORAL at 05:58

## 2024-12-20 RX ADMIN — PANTOPRAZOLE 40 MILLIGRAM(S): 40 TABLET, DELAYED RELEASE ORAL at 05:59

## 2024-12-20 RX ADMIN — IPRATROPIUM BROMIDE AND ALBUTEROL SULFATE 3 MILLILITER(S): .5; 2.5 SOLUTION RESPIRATORY (INHALATION) at 20:18

## 2024-12-20 RX ADMIN — IPRATROPIUM BROMIDE AND ALBUTEROL SULFATE 3 MILLILITER(S): .5; 2.5 SOLUTION RESPIRATORY (INHALATION) at 08:59

## 2024-12-20 RX ADMIN — MONTELUKAST SODIUM 10 MILLIGRAM(S): 10 TABLET, FILM COATED ORAL at 11:34

## 2024-12-20 RX ADMIN — OSELTAMIVIR 75 MILLIGRAM(S): 75 CAPSULE ORAL at 17:10

## 2024-12-20 RX ADMIN — ENOXAPARIN SODIUM 40 MILLIGRAM(S): 60 INJECTION INTRAVENOUS; SUBCUTANEOUS at 05:58

## 2024-12-20 RX ADMIN — OSELTAMIVIR 75 MILLIGRAM(S): 75 CAPSULE ORAL at 05:59

## 2024-12-20 RX ADMIN — Medication 100 MILLIGRAM(S): at 23:10

## 2024-12-20 RX ADMIN — Medication 100 MILLIGRAM(S): at 13:36

## 2024-12-20 RX ADMIN — METHYLPREDNISOLONE 60 MILLIGRAM(S): 4 TABLET ORAL at 11:34

## 2024-12-20 RX ADMIN — Medication 100 MILLIGRAM(S): at 05:59

## 2024-12-20 RX ADMIN — METHYLPREDNISOLONE 60 MILLIGRAM(S): 4 TABLET ORAL at 17:10

## 2024-12-20 RX ADMIN — METHYLPREDNISOLONE 60 MILLIGRAM(S): 4 TABLET ORAL at 23:10

## 2024-12-20 NOTE — DISCHARGE NOTE PROVIDER - PROVIDER TOKENS
PROVIDER:[TOKEN:[[id number]],FOLLOWUP:[2 weeks]],PROVIDER:[TOKEN:[[id number]],FOLLOWUP:[1 week]],PROVIDER:[TOKEN:[[id number]],FOLLOWUP:[1 week]]

## 2024-12-20 NOTE — DIETITIAN INITIAL EVALUATION ADULT - PERTINENT MEDS FT
MEDICATIONS  (STANDING):  albuterol/ipratropium for Nebulization 3 milliLiter(s) Nebulizer every 6 hours  enoxaparin Injectable 40 milliGRAM(s) SubCutaneous every 24 hours  methylPREDNISolone sodium succinate Injectable 60 milliGRAM(s) IV Push every 6 hours  montelukast 10 milliGRAM(s) Oral daily  oseltamivir 75 milliGRAM(s) Oral two times a day  pantoprazole    Tablet 40 milliGRAM(s) Oral before breakfast    MEDICATIONS  (PRN):  acetaminophen     Tablet .. 650 milliGRAM(s) Oral every 6 hours PRN Temp greater or equal to 38C (100.4F), Mild Pain (1 - 3)  guaiFENesin Oral Liquid (Sugar-Free) 100 milliGRAM(s) Oral every 6 hours PRN Cough  ondansetron Injectable 4 milliGRAM(s) IV Push every 8 hours PRN Nausea and/or Vomiting

## 2024-12-20 NOTE — PROGRESS NOTE ADULT - PROBLEM SELECTOR PLAN 2
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 Loculated fluid within the pelvic cul-de-sac also encircling the right ovary. Omental carcinomatosis and peritoneal metastatic disease. Small volume pelvic ascites.   - GYN following

## 2024-12-20 NOTE — DIETITIAN INITIAL EVALUATION ADULT - FACTORS AFF FOOD INTAKE
acute illness/pain/persistent nausea/Gnosticism/ethnic/cultural/personal food preferences/vomiting/other (specify)

## 2024-12-20 NOTE — DIETITIAN NUTRITION RISK NOTIFICATION - ADDITIONAL COMMENTS/DIETITIAN RECOMMENDATIONS
Recommend oral supplement Ensure Plus HP QD (350 kcal, 20 g pro each) as medically feasible. Honor food preferences.

## 2024-12-20 NOTE — DISCHARGE NOTE PROVIDER - HOSPITAL COURSE
54 year old, F, from home, w/ PMH Asthma and UC [diagnosed in 2006].  Presented to the ED for abdominal pain x 2 weeks, headache, nausea, and vomiting x 1 week and dizziness x 1 day. Endorses non-radiating, intermittent sharp lower abdominal pain. Also complained on a generalized headache, and nausea and nonbloody, nonbilious vomiting with decreased PO intake. Patient noted to have bloody stool for past  2 weeks and diarrhea that started yesterday. Patient states she feels very weak. Most recent colonoscopy was in 2022, which was unremarkable. Patient saw PCP Dr. Jenae Wiggins recently who recommended a repeat colonoscopy which was scheduled for next month.  Admitted for Symptomatic anemia and malignancy [place].    CT A/P shows Ascites and omental carcinomatosis consistent with metastatic disease.                               7.0 x 2.6 x 3.2 cm cystic right adnexal lesion, possibly ovarian or tubular malignancy.                               Irregular thickening of the sigmoid colon also concerning for malignancy.   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 Loculated fluid within the pelvic cul-de-sac also encircling the right   ovary. Omental carcinomatosis and peritoneal metastatic disease. Small volume pelvic ascites.     IR consulted  for Omental Biopsy which shows ++++++++++++  GI following rec's Colonoscopy which shows ++++++++  GYN following whom rec's ++++++++  Surg following and rec's No surg intervention   Heme/onc following s/p 2U PRBC's rec's transfusion and maintain hb<7.   Pilm following Hx asthma + Influenza B. rec's to cont Tamiflu po BID for 5 days total.     INCOMPLETE     Please note that this a brief summary of hospital course please refer to daily progress notes and consult notes for full course and events. Patient seen and examined at bedside, discussed with medical attending. Patient medically cleared for discharge to+++++++ 54 year old, F, from home, w/ PMH Asthma and UC [diagnosed in 2006].  Presented to the ED for abdominal pain x 2 weeks, headache, nausea, and vomiting x 1 week and dizziness x 1 day. Endorses non-radiating, intermittent sharp lower abdominal pain. Also complained on a generalized headache, and nausea and nonbloody, nonbilious vomiting with decreased PO intake. Patient noted to have bloody stool for past  2 weeks and diarrhea that started yesterday. Patient states she feels very weak. Most recent colonoscopy was in 2022, which was unremarkable. Patient saw PCP Dr. Jenae Wiggins recently who recommended a repeat colonoscopy which was scheduled for next month.  Admitted for Symptomatic anemia and malignancy [place].    CT A/P shows Ascites and omental carcinomatosis consistent with metastatic disease.                               7.0 x 2.6 x 3.2 cm cystic right adnexal lesion, possibly ovarian or tubular malignancy.                               Irregular thickening of the sigmoid colon also concerning for malignancy.   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 Loculated fluid within the pelvic cul-de-sac also encircling the right   ovary. Omental carcinomatosis and peritoneal metastatic disease. Small volume pelvic ascites.     IR consulted  for Omental Biopsy preformed 12/23/2024. Will need to follow up with heme/onc outpatient for pathologies.   GI following rec's Colonoscopy preformed 12/24/2024. Will need to follow up with GI outpatient for pathologies.   GYN following and will need to follow up outpt once all pathologies return.   Surg following and rec's No surg intervention   Heme/onc following s/p 2U PRBC's rec's transfusion and maintain hb<7.   Pulm following Hx asthma + Influenza B. rec's to cont Tamiflu po BID for 5 days total and completed, symptoms improved.       Please note that this a brief summary of hospital course. Patient seen and examined at bedside, discussed with medical attending. Patient medically cleared for discharge to home.

## 2024-12-20 NOTE — DISCHARGE NOTE PROVIDER - NSDCMRMEDTOKEN_GEN_ALL_CORE_FT
Breo Ellipta 100 mcg-25 mcg/inh inhalation powder: 1 inhaled once a day  pantoprazole 40 mg oral delayed release tablet: 1 tab(s) orally once a day (before a meal)

## 2024-12-20 NOTE — DIETITIAN INITIAL EVALUATION ADULT - PROBLEM SELECTOR PLAN 2
39w4d p/w lower abdominal pain x 2 weeks, nausea and vomiting  CTAP: 7.0 x 2.6 x 3.2 cm cystic R adnexal lesion, possibly ovarian or tubular malignancy  ascites and omental carcinomatosis consistent with metastatic disease  - consult heme/onc  - c/w zofran prn

## 2024-12-20 NOTE — PROGRESS NOTE ADULT - SUBJECTIVE AND OBJECTIVE BOX
Patient seen resting comfortably.  No new complaints at this time.  Denies HA, CP, SOB, dizziness, palpitations, vaginal bleeding or any other concerns.  + Ambulation, + void without difficulty, + flatus and tolerating regular diet.     Vital Signs Last 24 Hrs  T(C): 36.5 (20 Dec 2024 05:28), Max: 36.9 (19 Dec 2024 14:05)  T(F): 97.7 (20 Dec 2024 05:28), Max: 98.5 (19 Dec 2024 14:05)  HR: 69 (20 Dec 2024 05:28) (69 - 81)  BP: 115/83 (20 Dec 2024 05:28) (115/83 - 126/67)  BP(mean): --  RR: 17 (20 Dec 2024 05:28) (17 - 18)  SpO2: 98% (20 Dec 2024 05:28) (95% - 98%)    Parameters below as of 20 Dec 2024 05:28  Patient On (Oxygen Delivery Method): room air      CAPILLARY BLOOD GLUCOSE      Gen: A&O x3, NAD  Abdomen: Soft, nontender, nondistended  Breast: soft, nontender, no palpable masses, no lymphadenopathy   Gyn: No active bleeding, Palpable right adnexal mass, uterus smooth, nontender  Extremities: Nontender, no worsening edema  PE done by Dr Harris at the bedside                           9.0    8.21  )-----------( 357      ( 20 Dec 2024 06:14 )             29.9       12-20    137  |  104  |  14  ----------------------------<  180[H]  3.6   |  25  |  0.56    Ca    8.8      20 Dec 2024 06:14  Phos  4.2     12-20  Mg     2.5     12-20    TPro  7.8  /  Alb  3.2[L]  /  TBili  0.3  /  DBili  x   /  AST  11  /  ALT  14  /  AlkPhos  63  12-20    12-20    137  |  104  |  14  ----------------------------<  180[H]  3.6   |  25  |  0.56    Ca    8.8      20 Dec 2024 06:14  Phos  4.2     12-20  Mg     2.5     12-20    TPro  7.8  /  Alb  3.2[L]  /  TBili  0.3  /  DBili  x   /  AST  11  /  ALT  14  /  AlkPhos  63  12-20    CAPILLARY BLOOD GLUCOSE        LIVER FUNCTIONS - ( 20 Dec 2024 06:14 )  Alb: 3.2 g/dL / Pro: 7.8 g/dL / ALK PHOS: 63 U/L / ALT: 14 U/L DA / AST: 11 U/L / GGT: x

## 2024-12-20 NOTE — DIETITIAN INITIAL EVALUATION ADULT - ORAL INTAKE PTA/DIET HISTORY
n/a
Pt is from home, alert and orientated, well communicated. H/o ulcerative colitis, asthma; admitted for anemia, pt is noted with omental mass likely with mets as per chart review. Pt is noted with N/V x 1 week and abdominal pain x 2 weeks resulting in poor PO intake. No GI distress at this time as per pt however reports 1 episode of diarrhea this morning. Pt reports 10lb wt loss over 2 weeks d/t poor PO intake,  lbs. Pt currently reports improved appetite with good PO intake consuming % of meals. Food prefs obtained and communicated to kitchen. NKFA. No chewing/ swallowing issues reported.

## 2024-12-20 NOTE — DISCHARGE NOTE PROVIDER - NSDCCPCAREPLAN_GEN_ALL_CORE_FT
PRINCIPAL DISCHARGE DIAGNOSIS  Diagnosis: Symptomatic anemia  Assessment and Plan of Treatment: You presented with a hemoglobin of 5.3 and recieved 2 units of blood and your levels went up to 9.0.   Symptoms to report, bleeding, palpitations, fatigue, pale skin, cold skin, dizziness. Take medications as ordered by PCP and follow up w ithin 1 week for repeat blood work.      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 IR was consulted for a biopsy which you underwent and pathologies show +++++++    Diagnosis: Adnexal mass  Assessment and Plan of Treatment: You had  CATscan of your abdomin and pelvis which shows  a 7.0 x 2.6 x 3.2 cm cystic right adnexal lesion. GYN was following you during your hospitalizations and rec's +++++++++    Diagnosis: Ulcerative colitis  Assessment and Plan of Treatment: You have a history of Ulcertative colitis andwas evaluated by   GI and was started on Solumedrol 60mg. Continue taking medication as prescribed and follow up with your gastroenterologist within 1 week.       Diagnosis: Hypokalemia  Assessment and Plan of Treatment: You presented with a low potassium of 3.2 which you were treated for and has sinced resolved.   Low potassium can have causes that are not due to underlying desease. can be due to inadequate dietary intake for your case  continue to have blood work to chekc potassium level and then get supplement is needed based on blood work   Weakness, fatigue, constipation are expected due to low potassium level in your blood  If you have arrhythmia, chest pain please call your primary care provider right away and follow up with your PCP in 1 week upon discharge.    Diagnosis: Thrombocytosis  Assessment and Plan of Treatment: Your blood work indicated a  PLT level of  416  Likely reactive to your malignancy diagnosis.    Diagnosis: Mural thickening of sigmoid colon  Assessment and Plan of Treatment: You presented with abdominal pain & bloody stool x 2 weeks and have a history of  UC, dx 2006, Reported [place] 2022, unremarkable  Your catscan showed  irregular thickening of sigmoid colon also concerning for malignancy  You had blood work completed results as follows : CEA-(14), CA 19-9--(527), & 125--(165) pending-f/u results   You were followed by GI impatient whom preformed a colonoscopy which showed ++++++++++      Diagnosis: Influenza B  Assessment and Plan of Treatment: You tested positive for flu B and was evaluated by a pulmonologist inpatient whom reccommended Tamiflu x 5 days. With improvement you were taken off isolation.       Diagnosis: Asthma  Assessment and Plan of Treatment: You have a history of asthma and were in excerbation due to your diagnosis of flu. You were managed with nebulizer treatments with improvement. Continue your medication as prescribed.     PRINCIPAL DISCHARGE DIAGNOSIS  Diagnosis: Symptomatic anemia  Assessment and Plan of Treatment: You presented with a hemoglobin of 5.3 and recieved 2 units of blood and your levels went up to 9.0.   Symptoms to report, bleeding, palpitations, fatigue, pale skin, cold skin, dizziness. Take medications as ordered by PCP and follow up w ithin 1 week for repeat blood work.      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 IR was consulted for a biopsy which you underwent and preformed 12/23/2024. Will need to follow up with heme/onc outpatient for pathologies with dr Nael Nayak, [facility]  Medical Oncology  [address] [place] [id number]  Phone: [phone]      Diagnosis: Adnexal mass  Assessment and Plan of Treatment: You had  CATscan of your abdomin and pelvis which shows  a 7.0 x 2.6 x 3.2 cm cystic right adnexal lesion. GYN was following you during your hospitalizations and you will need to follow up outpatient with [facility]  Gynecologic Oncology  [address] [place] [address], [place] [address] [address]  Phone: [phone]    Diagnosis: Mural thickening of sigmoid colon  Assessment and Plan of Treatment: You presented with abdominal pain & bloody stool x 2 weeks and have a history of  UC, dx 2006, Reported [place] 2022, unremarkable  Your catscan showed  irregular thickening of sigmoid colon also concerning for malignancy  You had blood work completed results as follows : CEA-(14), CA 19-9--(527), & 125--(165) pending-f/u results   You were followed by GI impatient whom preformed a colonoscopy on 12/24/2024 and will need to follow up outpatient with Dr Mcwilliams in 1 week for pathology results.   Sarabjit Mcwilliams  Gastroenterology  [address] [facility], [address]  [place] [address]  Phone: [phone]      Diagnosis: Ulcerative colitis  Assessment and Plan of Treatment: You have a history of Ulcertative colitis andwas evaluated by   GI and was started on Solumedrol 60mg and was stopped 12/23 with symthom improvement. follow up with your gastroenterologist within 1 week.       Diagnosis: Hypokalemia  Assessment and Plan of Treatment: You presented with a low potassium of 3.2 which you were treated for and has sinced resolved.   Low potassium can have causes that are not due to underlying desease. can be due to inadequate dietary intake for your case  continue to have blood work to chekc potassium level and then get supplement is needed based on blood work   Weakness, fatigue, constipation are expected due to low potassium level in your blood  If you have arrhythmia, chest pain please call your primary care provider right away and follow up with your PCP in 1 week upon discharge.    Diagnosis: Thrombocytosis  Assessment and Plan of Treatment: Your blood work indicated a  PLT level of  416  Likely reactive to your malignancy diagnosis.    Diagnosis: Influenza B  Assessment and Plan of Treatment: You tested positive for flu B and was evaluated by a pulmonologist inpatient whom reccommended Tamiflu x 5 days. With improvement you were taken off isolation.       Diagnosis: Asthma  Assessment and Plan of Treatment: You have a history of asthma and were in excerbation due to your diagnosis of flu. You were managed with nebulizer treatments with improvement. Continue your medication as prescribed.

## 2024-12-20 NOTE — PROGRESS NOTE ADULT - SUBJECTIVE AND OBJECTIVE BOX
[   ] ICU                                          [   ] CCU                                      [ X  ] Medical Floor    Patient is a 54 year old female with abdominal pain. GI consulted to evaluate.          54 year old female from home, with past medical history significant for asthma and Ulcerative Colitis presented to the emergency room with 2 weeks history of progressively worsening intermittent 8/10 intensity non radiating sharp lower abdominal pain associated with bloody watery diarrhea associated with nausea, non bloody vomiting, headache and loss of appetite.      Patient appears comfortable. No new complaints reported, No nausea, vomiting, hematemesis, hematochezia, melena, fever, chills, chest pain, SOB, cough or diarrhea reported.       Prior Colonoscopy:  2022      PAST MEDICAL HISTORY    Asthma    Ulcerative colitis        PAST SURGICAL HISTORY    No significant past surgical history reported        Allergies    No Known Allergies    Intolerances  None         SOCIAL HISTORY  Advanced Directives:       [ X ] Full Code       [  ] DNR  Marital Status:         [  ] M      [  ] S      [  ] D       [  ] W  Children:       [ X ] Yes      [  ] No  Occupation:        [  ] Employed       [X  ] Unemployed       [  ] Retired  Diet:       [ X ] Regular       [  ] PEG feeding          [  ] NG tube feeding  Drug Use:           [X  ] No          [  ] Yes  Alcohol:           [X  ] No             [  ] Yes (socially)         [  ] Yes (chronic)  Tobacco:           [  ] Yes           [ X ] No      FAMILY HISTORY  [ X ] Heart Disease            [ X ] Diabetes             [ X ] HTN             [  ] Colon Cancer             [  ] Stomach Cancer              [  ] Pancreatic Cancer        VITALS   Vital Signs Last 24 Hrs  T(C): 37 (20 Dec 2024 14:08), Max: 37 (20 Dec 2024 14:08)  T(F): 98.6 (20 Dec 2024 14:08), Max: 98.6 (20 Dec 2024 14:08)  HR: 79 (20 Dec 2024 14:08) (69 - 81)  BP: 119/71 (20 Dec 2024 14:08) (115/83 - 126/67)   RR: 17 (20 Dec 2024 14:08) (17 - 18)  SpO2: 98% (20 Dec 2024 14:08) (95% - 98%)  Parameters below as of 20 Dec 2024 14:08  Patient On (Oxygen Delivery Method): room air        MEDICATIONS  (STANDING):  albuterol/ipratropium for Nebulization 3 milliLiter(s) Nebulizer every 6 hours  enoxaparin Injectable 40 milliGRAM(s) SubCutaneous every 24 hours  methylPREDNISolone sodium succinate Injectable 60 milliGRAM(s) IV Push every 6 hours  montelukast 10 milliGRAM(s) Oral daily  oseltamivir 75 milliGRAM(s) Oral two times a day  pantoprazole    Tablet 40 milliGRAM(s) Oral before breakfast    MEDICATIONS  (PRN):  acetaminophen     Tablet .. 650 milliGRAM(s) Oral every 6 hours PRN Temp greater or equal to 38C (100.4F), Mild Pain (1 - 3)  guaiFENesin Oral Liquid (Sugar-Free) 100 milliGRAM(s) Oral every 6 hours PRN Cough  ondansetron Injectable 4 milliGRAM(s) IV Push every 8 hours PRN Nausea and/or Vomiting                            9.0    8.21  )-----------( 357      ( 20 Dec 2024 06:14 )             29.9       12-20    137  |  104  |  14  ----------------------------<  180[H]  3.6   |  25  |  0.56    Ca    8.8      20 Dec 2024 06:14  Phos  4.2     12-20  Mg     2.5     12-20    TPro  7.8  /  Alb  3.2[L]  /  TBili  0.3  /  DBili  x   /  AST  11  /  ALT  14  /  AlkPhos  63  12-20

## 2024-12-20 NOTE — DISCHARGE NOTE PROVIDER - DETAILS OF MALNUTRITION DIAGNOSIS/DIAGNOSES
This patient has been assessed with a concern for Malnutrition and was treated during this hospitalization for the following Nutrition diagnosis/diagnoses:     -  12/20/2024: Severe protein-calorie malnutrition

## 2024-12-20 NOTE — PROGRESS NOTE ADULT - ASSESSMENT
A/P: 53 yo with adnexal vs rectosigmoid mass, Influenza B, and symptomatic anemia, Pt stable     - Continue recommendations per IM, GI, Pulm and Heme/Onc   - Tamiflu   - Labs reviewed   - Colonoscopy recommended to determine origin of mass  - Continue Asthma regimen   - Will continue to follow     D/W Dr Harris

## 2024-12-20 NOTE — PROGRESS NOTE ADULT - PROBLEM SELECTOR PLAN 3
H/o UC, dx 2006, Reported [place] 2022, unremarkable  - P/w abdominal pain & bloody stool x 2 weeks  - CTAP showed irregular thickening of sigmoid colon also concerning for malignancy  - CEA, CA 19-9, & 125 pending-f/u results   - GI-Dr. Mcwilliams

## 2024-12-20 NOTE — PROGRESS NOTE ADULT - SUBJECTIVE AND OBJECTIVE BOX
Patient is scheduled for CT guided omental biopsy on Monday.  Please keep patient NPO after midnight on Sunday. On Monday send early AM labs including CBC, BMP, and PT, INR / PTT.  Hold all anticoagulation, NSAIDS, and antiplatelet medication.  Please place an IR Procedure order.

## 2024-12-20 NOTE — PROGRESS NOTE ADULT - SUBJECTIVE AND OBJECTIVE BOX
Patient is a 54y old  Female who presents with a chief complaint of symptomatic anemia and malignancy work up (19 Dec 2024 11:27)    pt seen in icu [  ], reg med floor [ x  ], bed [ x ], chair at bedside [   ], a+o x3 [ x ], lethargic [  ],    nad [ x ]      Allergies    No Known Allergies        Vitals    T(F): 97.7 (12-20-24 @ 05:28), Max: 98.5 (12-19-24 @ 14:05)  HR: 69 (12-20-24 @ 05:28) (69 - 81)  BP: 115/83 (12-20-24 @ 05:28) (115/83 - 126/67)  RR: 17 (12-20-24 @ 05:28) (17 - 18)  SpO2: 98% (12-20-24 @ 05:28) (95% - 98%)  Wt(kg): --  CAPILLARY BLOOD GLUCOSE          Labs                          9.1    7.35  )-----------( 395      ( 19 Dec 2024 05:15 )             29.4       12-19    133[L]  |  101  |  7   ----------------------------<  197[H]  3.9   |  25  |  0.54    Ca    9.3      19 Dec 2024 05:15    TPro  8.1  /  Alb  3.3[L]  /  TBili  0.4  /  DBili  x   /  AST  13  /  ALT  16  /  AlkPhos  68  12-19                Radiology Results      Meds    MEDICATIONS  (STANDING):  albuterol/ipratropium for Nebulization 3 milliLiter(s) Nebulizer every 6 hours  enoxaparin Injectable 40 milliGRAM(s) SubCutaneous every 24 hours  methylPREDNISolone sodium succinate Injectable 60 milliGRAM(s) IV Push every 6 hours  montelukast 10 milliGRAM(s) Oral daily  oseltamivir 75 milliGRAM(s) Oral two times a day  pantoprazole    Tablet 40 milliGRAM(s) Oral before breakfast      MEDICATIONS  (PRN):  acetaminophen     Tablet .. 650 milliGRAM(s) Oral every 6 hours PRN Temp greater or equal to 38C (100.4F), Mild Pain (1 - 3)  guaiFENesin Oral Liquid (Sugar-Free) 100 milliGRAM(s) Oral every 6 hours PRN Cough  ondansetron Injectable 4 milliGRAM(s) IV Push every 8 hours PRN Nausea and/or Vomiting      Physical Exam    Neuro :  no focal deficits  Respiratory: CTA B/L  CV: RRR, S1S2, no murmurs,   Abdominal: Soft, NT, ND +BS,  Extremities: No edema, + peripheral pulses      ASSESSMENT    symptomatic anemia,   omental carcinomatosis likely metastatic disease,   r/o colon ca,   adnexal mass r/o ovarian or tubular malig,   hypokalemia,   influenza b,   h/o  asthma   UC [diagnosed in 2006]         PLAN    s/p transfuse 2 units prbc,   hgb improved   ca 125, ca19-9, cea, elevated noted above   heme onc cons  iron studies with low iron noted above   gi f/u   pt with Ulcerative colitis exacerbation   r/o colon ca   Solumedrol 60mg q6h  Colonoscopy  protonix   surg f/u   - No acute intervention   - F/U GI for colonoscopy   gyn onc f/u   cystic right adnexal lesion, possibly ovarian or tubular malignancy.   Possible GYN malignancy at R adnexa   f/u mri abd pelv    supplemented potassium,   cont tamiflu x 3/5 days,   pulm f/u    cont bronchodilators   cont singulair   cont Breo Ellipta  cont current meds         Patient is a 54y old  Female who presents with a chief complaint of symptomatic anemia and malignancy work up (19 Dec 2024 11:27)    pt seen in icu [  ], reg med floor [ x  ], bed [ x ], chair at bedside [   ], a+o x3 [ x ], lethargic [  ],    nad [ x ]      Allergies    No Known Allergies        Vitals    T(F): 97.7 (12-20-24 @ 05:28), Max: 98.5 (12-19-24 @ 14:05)  HR: 69 (12-20-24 @ 05:28) (69 - 81)  BP: 115/83 (12-20-24 @ 05:28) (115/83 - 126/67)  RR: 17 (12-20-24 @ 05:28) (17 - 18)  SpO2: 98% (12-20-24 @ 05:28) (95% - 98%)  Wt(kg): --  CAPILLARY BLOOD GLUCOSE          Labs                          9.1    7.35  )-----------( 395      ( 19 Dec 2024 05:15 )             29.4       12-19    133[L]  |  101  |  7   ----------------------------<  197[H]  3.9   |  25  |  0.54    Ca    9.3      19 Dec 2024 05:15    TPro  8.1  /  Alb  3.3[L]  /  TBili  0.4  /  DBili  x   /  AST  13  /  ALT  16  /  AlkPhos  68  12-19      Radiology Results    < from: MR Pelvis w/ Oral Cont and w/wo IV Cont (12.19.24 @ 15:11) >  FINDINGS:  UTERUS: Approximately measures 7 x 3 x 4 cm. No discrete myometrial mass  ENDOMETRIUM: Poorly defined  JUNCTIONAL ZONE: Thickened consistent with adenomyosis    RIGHT OVARY: Atrophic  LEFT OVARY: Atrophic  ADNEXA: Within normal limits.    BLADDER: Minimally distended    LYMPH NODES: No pelvic lymphadenopathy.    VISUALIZED PORTIONS:  BOWEL: Unobstructed bowel. Transmural rectosigmoid tumor involves a   length of approximately 12 cm  PERITONEUM: Loculated fluid within the pelvic cul-de-sac also encircling   the right ovary corresponds with the CT observation. Omental   carcinomatosis. Small volume pelvic ascites. Peritoneal metastases along   cul-de-sac.  VESSELS: Within normal limits.  ABDOMINAL WALL: Tiny fat-containing umbilical hernia.  BONES: L4-L5 degenerative disc disease    IMPRESSION:  Transmural rectosigmoid tumor involves a length of approximately 12 cm.   Recommend colonoscopy with tissue sampling to confirm diagnosis    Loculated fluid within the pelvic cul-de-sac also encircling the right   ovary corresponds with the CT observation    Omental carcinomatosis and peritoneal metastatic disease. Small volume   pelvic ascites    < end of copied text >        Meds    MEDICATIONS  (STANDING):  albuterol/ipratropium for Nebulization 3 milliLiter(s) Nebulizer every 6 hours  enoxaparin Injectable 40 milliGRAM(s) SubCutaneous every 24 hours  methylPREDNISolone sodium succinate Injectable 60 milliGRAM(s) IV Push every 6 hours  montelukast 10 milliGRAM(s) Oral daily  oseltamivir 75 milliGRAM(s) Oral two times a day  pantoprazole    Tablet 40 milliGRAM(s) Oral before breakfast      MEDICATIONS  (PRN):  acetaminophen     Tablet .. 650 milliGRAM(s) Oral every 6 hours PRN Temp greater or equal to 38C (100.4F), Mild Pain (1 - 3)  guaiFENesin Oral Liquid (Sugar-Free) 100 milliGRAM(s) Oral every 6 hours PRN Cough  ondansetron Injectable 4 milliGRAM(s) IV Push every 8 hours PRN Nausea and/or Vomiting      Physical Exam    Neuro :  no focal deficits  Respiratory: CTA B/L  CV: RRR, S1S2, no murmurs,   Abdominal: Soft, NT, ND +BS,  Extremities: No edema, + peripheral pulses      ASSESSMENT    symptomatic anemia,   omental carcinomatosis likely metastatic disease,   recto sigmoid mass likely malignancy   adnexal mass r/o ovarian or tubular malig,   hypokalemia,   influenza b,   h/o  asthma   UC [diagnosed in 2006]         PLAN    s/p transfuse 2 units prbc,   hgb improved   ca 125, ca19-9, cea, elevated noted    heme onc f/u   iron studies with low iron noted     ·daily cbc  ·transfusion for hb<7.  pt with Omental carcinomatosis  ·This is highly suspicious for Gynecologic malignancy ie ovarian cancer with large right adnexal mass  · 165, CEA 14,  527.    ·Patient needs a CT chest to complete workup.  ·Patient also found to have sigmoid colon thickening concerning for possible colon cancer especially with her history of UC.  ·Will need a colonoscopy by GI  ·Gyn onc consult  ·Also would need a biopsy of the omental nodule for tissue diagnosis as this could be separate from the sigmoid colon thickening  gi f/u   pt with Ulcerative colitis exacerbation   Solumedrol 60mg q6h   rectosigmoid mass  f/u Colonoscopy  protonix   surg f/u   - No acute intervention   - F/U GI for colonoscopy   gyn onc f/u   cystic right adnexal lesion, possibly ovarian or tubular malignancy.   Possible GYN malignancy at R adnexa   mri pelv with Transmural rectosigmoid tumor involves a length of approximately 12 cm.   Recommend colonoscopy with tissue sampling to confirm diagnosis  Loculated fluid within the pelvic cul-de-sac also encircling the right   ovary corresponds with the CT observation. Omental carcinomatosis and peritoneal   metastatic disease. Small volume pelvic ascites noted above.    supplemented potassium,   cont tamiflu x 4/5 days,   pulm f/u    cont bronchodilators   cont singulair   cont Breo Ellipta  cont current meds

## 2024-12-20 NOTE — DIETITIAN INITIAL EVALUATION ADULT - PROBLEM SELECTOR PLAN 5
K 3.2 on admission  likely 2/2 diarrhea  KCl PO powder 40 meq x 2  - monitor and supplement PRN fever

## 2024-12-20 NOTE — DISCHARGE NOTE PROVIDER - CARE PROVIDERS DIRECT ADDRESSES
,[email or web address],DirectAddress_Unknown

## 2024-12-20 NOTE — PROGRESS NOTE ADULT - PROBLEM SELECTOR PLAN 10
Pt from home   colonoscopy with GI when pt is off isolation   f/u CBC  f/u Bcx  IR for Omental Biopsy Pt from home   ---IR consulted   CT guided omental biopsy on Monday,  NPO after midnight on Sunday. On Monday send early AM labs including CBC, BMP, and PT, INR / PTT.  Hold all anticoagulation, NSAIDS, and antiplatelet medication.  ---GI following rec's Colonoscopy once pt is off isolation   --GYN following recs post MR results   ---f/u Bcx   --monitor CBC

## 2024-12-20 NOTE — PROGRESS NOTE ADULT - ASSESSMENT
54 year old, F, from home, w/ PMH Asthma and UC [diagnosed in 2006].  Presented to the ED for abdominal pain x 2 weeks, headache, nausea, and vomiting x 1 week and dizziness x 1 day. Endorses non-radiating, intermittent sharp lower abdominal pain. Also complained on a generalized headache, and nausea and nonbloody, nonbilious vomiting with decreased PO intake. Patient noted to have bloody stool for past  2 weeks and diarrhea that started yesterday. Patient states she feels very weak. Most recent colonoscopy was in 2022, which was unremarkable. Patient saw PCP Dr. Jenae Wiggins recently who recommended a repeat colonoscopy which was scheduled for next month.  Admitted for Symptomatic anemia and malignancy ventura.    CT A/P shows Ascites and omental carcinomatosis consistent with metastatic disease.                               7.0 x 2.6 x 3.2 cm cystic right adnexal lesion, possibly ovarian or tubular malignancy.                               Irregular thickening of the sigmoid colon also concerning for malignancy.   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 Loculated fluid within the pelvic cul-de-sac also encircling the right   ovary. Omental carcinomatosis and peritoneal metastatic disease. Small volume pelvic ascites.     IR consulted  for Omental Biopsy   GI following rec's Colonoscopy once pt is off isolation   GYN following recs post MR results   Surg following and rec's No surg intervention   Heme/onc following s/p 2U PRBC's rec's transfusion and maintain hb<7.   Pilm following Hx asthma + Influenza B. rec's to cont Tamiflu po BID for 5 days total.    54 year old, F, from home, w/ PMH Asthma and UC [diagnosed in 2006].  Presented to the ED for abdominal pain x 2 weeks, headache, nausea, and vomiting x 1 week and dizziness x 1 day. Endorses non-radiating, intermittent sharp lower abdominal pain. Also complained on a generalized headache, and nausea and nonbloody, nonbilious vomiting with decreased PO intake. Patient noted to have bloody stool for past  2 weeks and diarrhea that started yesterday. Patient states she feels very weak. Most recent colonoscopy was in 2022, which was unremarkable. Patient saw PCP Dr. Jenae Wiggins recently who recommended a repeat colonoscopy which was scheduled for next month.  Admitted for Symptomatic anemia and malignancy ventura.    CT A/P shows Ascites and omental carcinomatosis consistent with metastatic disease.                               7.0 x 2.6 x 3.2 cm cystic right adnexal lesion, possibly ovarian or tubular malignancy.                               Irregular thickening of the sigmoid colon also concerning for malignancy.   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 Loculated fluid within the pelvic cul-de-sac also encircling the right   ovary. Omental carcinomatosis and peritoneal metastatic disease. Small volume pelvic ascites.     IR consulted   CT guided omental biopsy on Monday,  NPO after midnight on Sunday. On Monday send early AM labs including CBC, BMP, and PT, INR / PTT.  Hold all anticoagulation, NSAIDS, and antiplatelet medication.  GI following rec's Colonoscopy once pt is off isolation   GYN following recs post MR results   Surg following and rec's No surg intervention   Heme/onc following s/p 2U PRBC's rec's transfusion and maintain hb<7.   Pilm following Hx asthma + Influenza B. rec's to cont Tamiflu po BID for 5 days total.

## 2024-12-20 NOTE — DIETITIAN INITIAL EVALUATION ADULT - ADD RECOMMEND
Severe malnutrition; Recommend oral supplement Ensure Plus HP QD (350 kcal, 20 g pro each) as medically feasible. Honor food preferences.

## 2024-12-20 NOTE — DIETITIAN INITIAL EVALUATION ADULT - PERTINENT LABORATORY DATA
12-20    137  |  104  |  14  ----------------------------<  180[H]  3.6   |  25  |  0.56    Ca    8.8      20 Dec 2024 06:14  Phos  4.2     12-20  Mg     2.5     12-20    TPro  7.8  /  Alb  3.2[L]  /  TBili  0.3  /  DBili  x   /  AST  11  /  ALT  14  /  AlkPhos  63  12-20

## 2024-12-20 NOTE — PROGRESS NOTE ADULT - SUBJECTIVE AND OBJECTIVE BOX
NP Note discussed with  Primary Attending    Patient is a 54y old  Female who presents with a chief complaint of Abdominal pain, rectal bleeding (20 Dec 2024 09:35)      INTERVAL HPI/OVERNIGHT EVENTS: no new complaints    MEDICATIONS  (STANDING):  albuterol/ipratropium for Nebulization 3 milliLiter(s) Nebulizer every 6 hours  enoxaparin Injectable 40 milliGRAM(s) SubCutaneous every 24 hours  methylPREDNISolone sodium succinate Injectable 60 milliGRAM(s) IV Push every 6 hours  montelukast 10 milliGRAM(s) Oral daily  oseltamivir 75 milliGRAM(s) Oral two times a day  pantoprazole    Tablet 40 milliGRAM(s) Oral before breakfast    MEDICATIONS  (PRN):  acetaminophen     Tablet .. 650 milliGRAM(s) Oral every 6 hours PRN Temp greater or equal to 38C (100.4F), Mild Pain (1 - 3)  guaiFENesin Oral Liquid (Sugar-Free) 100 milliGRAM(s) Oral every 6 hours PRN Cough  ondansetron Injectable 4 milliGRAM(s) IV Push every 8 hours PRN Nausea and/or Vomiting      __________________________________________________  REVIEW OF SYSTEMS:    CONSTITUTIONAL: No fever,   EYES: no acute visual disturbances  NECK: No pain or stiffness  RESPIRATORY: No cough; No shortness of breath  CARDIOVASCULAR: No chest pain, no palpitations  GASTROINTESTINAL: No pain. No nausea or vomiting; No diarrhea   NEUROLOGICAL: No headache or numbness, no tremors  MUSCULOSKELETAL: No joint pain, no muscle pain  GENITOURINARY: no dysuria, no frequency, no hesitancy  PSYCHIATRY: no depression , no anxiety  ALL OTHER  ROS negative        Vital Signs Last 24 Hrs  T(C): 36.5 (20 Dec 2024 05:28), Max: 36.9 (19 Dec 2024 14:05)  T(F): 97.7 (20 Dec 2024 05:28), Max: 98.5 (19 Dec 2024 14:05)  HR: 69 (20 Dec 2024 05:28) (69 - 81)  BP: 115/83 (20 Dec 2024 05:28) (115/83 - 126/67)  BP(mean): --  RR: 17 (20 Dec 2024 05:28) (17 - 18)  SpO2: 98% (20 Dec 2024 05:28) (95% - 98%)    Parameters below as of 20 Dec 2024 05:28  Patient On (Oxygen Delivery Method): room air        ________________________________________________  PHYSICAL EXAM: Pt w/ dry cough   GENERAL: NAD  HEENT: Normocephalic;  conjunctivae and sclerae clear; moist mucous membranes;   NECK : supple  CHEST/LUNG: Clear to auscultation bilaterally with good air entry   HEART: S1 S2  regular; no murmurs, gallops or rubs  ABDOMEN: Soft, Nontender, Nondistended; Bowel sounds present  EXTREMITIES: no cyanosis; no edema; no calf tenderness  SKIN: warm and dry; no rash  NERVOUS SYSTEM:  Awake and alert; Oriented  to place, person and time ; no new deficits    _________________________________________________  LABS:                        9.0    8.21  )-----------( 357      ( 20 Dec 2024 06:14 )             29.9     12-20    137  |  104  |  14  ----------------------------<  180[H]  3.6   |  25  |  0.56    Ca    8.8      20 Dec 2024 06:14  Phos  4.2     12-20  Mg     2.5     12-20    TPro  7.8  /  Alb  3.2[L]  /  TBili  0.3  /  DBili  x   /  AST  11  /  ALT  14  /  AlkPhos  63  12-20      Urinalysis Basic - ( 20 Dec 2024 06:14 )    Color: x / Appearance: x / SG: x / pH: x  Gluc: 180 mg/dL / Ketone: x  / Bili: x / Urobili: x   Blood: x / Protein: x / Nitrite: x   Leuk Esterase: x / RBC: x / WBC x   Sq Epi: x / Non Sq Epi: x / Bacteria: x      CAPILLARY BLOOD GLUCOSE            RADIOLOGY & ADDITIONAL TESTS:  < from: MR Pelvis w/ Oral Cont and w/wo IV Cont (12.19.24 @ 15:11) >    ACC: [id number] EXAM:  MR PELVIS OC WAW IC   ORDERED BY: JUMANA RAMESH     PROCEDURE DATE:  12/19/2024          INTERPRETATION:  CLINICAL INFORMATION: Further assessment of right   adnexal findings identified on CT    COMPARISON: CT abdomen and pelvis 12/17/2024    CONTRAST/COMPLICATIONS:  IV Contrast: Gadavist  7.0 cc administered   0.5 cc discarded  Oral Contrast: NONE  .    PROCEDURE:  MRI of the pelvis was performed.      FINDINGS:  UTERUS: Approximately measures 7 x 3 x 4 cm. No discrete myometrial mass  ENDOMETRIUM: Poorly defined  JUNCTIONAL ZONE: Thickened consistent with adenomyosis    RIGHT OVARY: Atrophic  LEFT OVARY: Atrophic  ADNEXA: Within normal limits.    BLADDER: Minimally distended    LYMPH NODES: No pelvic lymphadenopathy.    VISUALIZED PORTIONS:  BOWEL: Unobstructed bowel. Transmural rectosigmoid tumor involves a   length of approximately 12 cm  PERITONEUM: Loculated fluid within the pelvic cul-de-sac also encircling   the right ovary corresponds with the CT observation. Omental   carcinomatosis. Small volume pelvic ascites. Peritoneal metastases along   cul-de-sac.  VESSELS: Within normal limits.  ABDOMINAL WALL: Tiny fat-containing umbilical hernia.  BONES: L4-L5 degenerative disc disease    IMPRESSION:  Transmural rectosigmoid tumor involves a length of approximately 12 cm.   Recommend colonoscopy with tissue sampling to confirm diagnosis    Loculated fluid within the pelvic cul-de-sac also encircling the right   ovary corresponds with the CT observation    Omental carcinomatosis and peritoneal metastatic disease. Small volume   pelvic ascites          --- End of Report ---            OZZY CORONA MD; Attending Radiologist  This document has been electronically signed. Dec 19 2024  4:05PM    < end of copied text >    Imaging Personally Reviewed:  YES    Consultant(s) Notes Reviewed:   YES    Plan of care was discussed with patient and /or primary care giver; all questions and concerns were addressed and care was aligned with patient's wishes.

## 2024-12-20 NOTE — PROGRESS NOTE ADULT - ASSESSMENT
Ms Jha is a pleasant 54 year old female with history of UC, admitted for abdominal pain and influenza B, found to have multiple omental carcinomatosis, right adnexal mass and sigmoid colon thickening    1. Omental carcinomatosis  ·	This is highly suspicious for Gynecologic malignancy ie ovarian cancer with large right adnexal mass  ·	 165, CEA 14,  527.    ·	MRI noted, loculated fluid around the ovary, carcinomatosis  ·	Patient needs a CT chest to complete workup, pending  ·	Patient also found to have sigmoid colon thickening concerning for possible colon cancer especially with her history of UC.  ·	Will need a colonoscopy by GI, pending recovery from flu  ·	Gyn onc consult appreciated, will ask gyn re loculated fluid around the ovary  ·	Also would need a biopsy of the omental nodule for tissue diagnosis as this could be separate from the sigmoid colon thickening, need IR eval  ·	Will continue to monitor.     2. Anemia  ·	Hb improved from 5.3 to 9.1 from PRBC transfusions.  ·	possibly associated with GI bleed from the UC and sigmoid colon thickening  ·	follow GI recs, pending colonoscopy  ·	daily cbc  ·	transfusion for hb<7  ·	ferritin 8, pls give pt venofer 200mg daily until d/c, can cont in office    Pt wants to go home today. If she does, still with rectal bleeding but hgb stable, may be able to complete further testing as outpt. Would still give her one dose of venofer before d/c if she goes home today. F/u gyn/onc re loculated fluid around the ovary to ensure this is not infectious before dc. Will try to expedite and facilitate testing in office as outpt if she goes home, pls give me a call as pt needs an appt early next week with us. D/w pt and primary team.

## 2024-12-20 NOTE — PROGRESS NOTE ADULT - PROBLEM SELECTOR PLAN 1
- P/w HA & dizziness  - Symptomatic Hgb 5.3. S/p 2U PRBC's hgb 9.1  - Per Attending monitor CBC daily  - S/p TIBC wnl  - Ferritin 8  GI Dr Lugo following -colonoscopy when pt is off isolation ( total 5 days )

## 2024-12-20 NOTE — PROGRESS NOTE ADULT - ASSESSMENT
1. Abdominal pain  2. Bloody diarrhea  3. Iron deficiency anemia  4. Ulcerative colitis exacerbation  5. Ascites  6. Peritoneal carcinomatosis  7. Sigmoid colon wall thickening   8. R/o colon cancer  9. R/o ovarian cancer  10. Elevated     Suggestions:    1. Monitor H/H  2. Transfuse PRBC as needed  3. Oncology follow up   4. Solumedrol 60mg q6h  5. Colonoscopy  6. GYN Follow up  7. Diet as tolerated   8. Protonix 40mg daily  9. Avoid NSAID  10. Iron supplement  11. DVT prophylaxis

## 2024-12-20 NOTE — PROGRESS NOTE ADULT - SUBJECTIVE AND OBJECTIVE BOX
Pt seen, feeling fine, no resp distress or sx, still with some rectal bleeding but she reports minimal    MEDICATIONS  (STANDING):  albuterol/ipratropium for Nebulization 3 milliLiter(s) Nebulizer every 6 hours  enoxaparin Injectable 40 milliGRAM(s) SubCutaneous every 24 hours  methylPREDNISolone sodium succinate Injectable 60 milliGRAM(s) IV Push every 6 hours  montelukast 10 milliGRAM(s) Oral daily  oseltamivir 75 milliGRAM(s) Oral two times a day  pantoprazole    Tablet 40 milliGRAM(s) Oral before breakfast    MEDICATIONS  (PRN):  acetaminophen     Tablet .. 650 milliGRAM(s) Oral every 6 hours PRN Temp greater or equal to 38C (100.4F), Mild Pain (1 - 3)  guaiFENesin Oral Liquid (Sugar-Free) 100 milliGRAM(s) Oral every 6 hours PRN Cough  ondansetron Injectable 4 milliGRAM(s) IV Push every 8 hours PRN Nausea and/or Vomiting      ROS  No fever, sweats, chills  No epistaxis, HA, sore throat  No CP, SOB, cough, sputum  No n/v/d, abd pain, melena  No edema  No rash  No anxiety  No back pain, joint pain  No bleeding, bruising  No dysuria, hematuria    Vital Signs Last 24 Hrs  T(C): 36.5 (20 Dec 2024 05:28), Max: 36.9 (19 Dec 2024 14:05)  T(F): 97.7 (20 Dec 2024 05:28), Max: 98.5 (19 Dec 2024 14:05)  HR: 69 (20 Dec 2024 05:28) (69 - 81)  BP: 115/83 (20 Dec 2024 05:28) (115/83 - 126/67)  BP(mean): --  RR: 17 (20 Dec 2024 05:28) (17 - 18)  SpO2: 98% (20 Dec 2024 05:28) (95% - 98%)    Parameters below as of 20 Dec 2024 05:28  Patient On (Oxygen Delivery Method): room air        PE  NAD  Awake, alert  Anicteric  full exam deferred today                          9.0    8.21  )-----------( 357      ( 20 Dec 2024 06:14 )             29.9       12-20    137  |  104  |  14  ----------------------------<  180[H]  3.6   |  25  |  0.56    Ca    8.8      20 Dec 2024 06:14  Phos  4.2     12-20  Mg     2.5     12-20    TPro  7.8  /  Alb  3.2[L]  /  TBili  0.3  /  DBili  x   /  AST  11  /  ALT  14  /  AlkPhos  63  12-20

## 2024-12-21 LAB
ALBUMIN SERPL ELPH-MCNC: 3 G/DL — LOW (ref 3.5–5)
ALP SERPL-CCNC: 59 U/L — SIGNIFICANT CHANGE UP (ref 40–120)
ALT FLD-CCNC: 16 U/L DA — SIGNIFICANT CHANGE UP (ref 10–60)
ANION GAP SERPL CALC-SCNC: 7 MMOL/L — SIGNIFICANT CHANGE UP (ref 5–17)
AST SERPL-CCNC: 10 U/L — SIGNIFICANT CHANGE UP (ref 10–40)
BILIRUB SERPL-MCNC: 0.3 MG/DL — SIGNIFICANT CHANGE UP (ref 0.2–1.2)
BUN SERPL-MCNC: 11 MG/DL — SIGNIFICANT CHANGE UP (ref 7–18)
CALCIUM SERPL-MCNC: 8.9 MG/DL — SIGNIFICANT CHANGE UP (ref 8.4–10.5)
CHLORIDE SERPL-SCNC: 104 MMOL/L — SIGNIFICANT CHANGE UP (ref 96–108)
CO2 SERPL-SCNC: 25 MMOL/L — SIGNIFICANT CHANGE UP (ref 22–31)
CREAT SERPL-MCNC: 0.47 MG/DL — LOW (ref 0.5–1.3)
EGFR: 113 ML/MIN/1.73M2 — SIGNIFICANT CHANGE UP
GLUCOSE SERPL-MCNC: 166 MG/DL — HIGH (ref 70–99)
HCT VFR BLD CALC: 29.4 % — LOW (ref 34.5–45)
HGB BLD-MCNC: 8.8 G/DL — LOW (ref 11.5–15.5)
MCHC RBC-ENTMCNC: 20.3 PG — LOW (ref 27–34)
MCHC RBC-ENTMCNC: 29.9 G/DL — LOW (ref 32–36)
MCV RBC AUTO: 67.7 FL — LOW (ref 80–100)
NRBC # BLD: 0 /100 WBCS — SIGNIFICANT CHANGE UP (ref 0–0)
PLATELET # BLD AUTO: 311 K/UL — SIGNIFICANT CHANGE UP (ref 150–400)
POTASSIUM SERPL-MCNC: 3.7 MMOL/L — SIGNIFICANT CHANGE UP (ref 3.5–5.3)
POTASSIUM SERPL-SCNC: 3.7 MMOL/L — SIGNIFICANT CHANGE UP (ref 3.5–5.3)
PROT SERPL-MCNC: 7.2 G/DL — SIGNIFICANT CHANGE UP (ref 6–8.3)
RBC # BLD: 4.34 M/UL — SIGNIFICANT CHANGE UP (ref 3.8–5.2)
RBC # FLD: 26.3 % — HIGH (ref 10.3–14.5)
SODIUM SERPL-SCNC: 136 MMOL/L — SIGNIFICANT CHANGE UP (ref 135–145)
WBC # BLD: 11.88 K/UL — HIGH (ref 3.8–10.5)
WBC # FLD AUTO: 11.88 K/UL — HIGH (ref 3.8–10.5)

## 2024-12-21 PROCEDURE — 71250 CT THORAX DX C-: CPT | Mod: 26

## 2024-12-21 RX ORDER — METHYLPREDNISOLONE 4 MG/1
60 TABLET ORAL EVERY 6 HOURS
Refills: 0 | Status: COMPLETED | OUTPATIENT
Start: 2024-12-21 | End: 2024-12-23

## 2024-12-21 RX ORDER — IRON SUCROSE 20 MG/ML
200 INJECTION, SOLUTION INTRAVENOUS EVERY 24 HOURS
Refills: 0 | Status: DISCONTINUED | OUTPATIENT
Start: 2024-12-21 | End: 2024-12-24

## 2024-12-21 RX ADMIN — MONTELUKAST SODIUM 10 MILLIGRAM(S): 10 TABLET, FILM COATED ORAL at 12:27

## 2024-12-21 RX ADMIN — IPRATROPIUM BROMIDE AND ALBUTEROL SULFATE 3 MILLILITER(S): .5; 2.5 SOLUTION RESPIRATORY (INHALATION) at 09:38

## 2024-12-21 RX ADMIN — IPRATROPIUM BROMIDE AND ALBUTEROL SULFATE 3 MILLILITER(S): .5; 2.5 SOLUTION RESPIRATORY (INHALATION) at 20:32

## 2024-12-21 RX ADMIN — PANTOPRAZOLE 40 MILLIGRAM(S): 40 TABLET, DELAYED RELEASE ORAL at 05:12

## 2024-12-21 RX ADMIN — OSELTAMIVIR 75 MILLIGRAM(S): 75 CAPSULE ORAL at 17:14

## 2024-12-21 RX ADMIN — OSELTAMIVIR 75 MILLIGRAM(S): 75 CAPSULE ORAL at 05:12

## 2024-12-21 RX ADMIN — METHYLPREDNISOLONE 60 MILLIGRAM(S): 4 TABLET ORAL at 17:15

## 2024-12-21 RX ADMIN — METHYLPREDNISOLONE 60 MILLIGRAM(S): 4 TABLET ORAL at 12:28

## 2024-12-21 RX ADMIN — IRON SUCROSE 110 MILLIGRAM(S): 20 INJECTION, SOLUTION INTRAVENOUS at 12:28

## 2024-12-21 RX ADMIN — IPRATROPIUM BROMIDE AND ALBUTEROL SULFATE 3 MILLILITER(S): .5; 2.5 SOLUTION RESPIRATORY (INHALATION) at 15:16

## 2024-12-21 RX ADMIN — METHYLPREDNISOLONE 60 MILLIGRAM(S): 4 TABLET ORAL at 05:13

## 2024-12-21 RX ADMIN — METHYLPREDNISOLONE 60 MILLIGRAM(S): 4 TABLET ORAL at 23:18

## 2024-12-21 RX ADMIN — ENOXAPARIN SODIUM 40 MILLIGRAM(S): 60 INJECTION INTRAVENOUS; SUBCUTANEOUS at 05:13

## 2024-12-21 NOTE — PROGRESS NOTE ADULT - ASSESSMENT
53 yo HD#5 with adnexal vs rectosigmoid mass, Influenza B, and symptomatic anemia, H&H stable s/p transfusion 2 U PRBC

## 2024-12-21 NOTE — PROGRESS NOTE ADULT - SUBJECTIVE AND OBJECTIVE BOX
[   ] ICU                                          [   ] CCU                                      [ X  ] Medical Floor      Patient is a 54 year old female with abdominal pain. GI consulted to evaluate.          54 year old female from home, with past medical history significant for asthma and Ulcerative Colitis presented to the emergency room with 2 weeks history of progressively worsening intermittent 8/10 intensity non radiating sharp lower abdominal pain associated with bloody watery diarrhea associated with nausea, non bloody vomiting, headache and loss of appetite.      Patient appears comfortable. No new complaints reported, No nausea, vomiting, hematemesis, hematochezia, melena, fever, chills, chest pain, SOB, cough or diarrhea reported.       Prior Colonoscopy:  2022      PAST MEDICAL HISTORY    Asthma    Ulcerative colitis        PAST SURGICAL HISTORY    No significant past surgical history reported        Allergies    No Known Allergies    Intolerances  None         SOCIAL HISTORY  Advanced Directives:       [ X ] Full Code       [  ] DNR  Marital Status:         [  ] M      [  ] S      [  ] D       [  ] W  Children:       [ X ] Yes      [  ] No  Occupation:        [  ] Employed       [X  ] Unemployed       [  ] Retired  Diet:       [ X ] Regular       [  ] PEG feeding          [  ] NG tube feeding  Drug Use:           [X  ] No          [  ] Yes  Alcohol:           [X  ] No             [  ] Yes (socially)         [  ] Yes (chronic)  Tobacco:           [  ] Yes           [ X ] No      FAMILY HISTORY  [ X ] Heart Disease            [ X ] Diabetes             [ X ] HTN             [  ] Colon Cancer             [  ] Stomach Cancer              [  ] Pancreatic Cancer      VITALS   T(C): 36.5  T(F):  97.7  HR:  74  BP:  138/81   RR: 17   SpO2: 95%     MEDICATIONS  (STANDING):  albuterol/ipratropium for Nebulization 3 milliLiter(s) Nebulizer every 6 hours  iron sucrose IVPB 200 milliGRAM(s) IV Intermittent every 24 hours  methylPREDNISolone sodium succinate Injectable 60 milliGRAM(s) IV Push every 6 hours  montelukast 10 milliGRAM(s) Oral daily  oseltamivir 75 milliGRAM(s) Oral two times a day  pantoprazole    Tablet 40 milliGRAM(s) Oral before breakfast    MEDICATIONS  (PRN):  acetaminophen     Tablet .. 650 milliGRAM(s) Oral every 6 hours PRN Temp greater or equal to 38C (100.4F), Mild Pain (1 - 3)  guaiFENesin Oral Liquid (Sugar-Free) 100 milliGRAM(s) Oral every 6 hours PRN Cough  ondansetron Injectable 4 milliGRAM(s) IV Push every 8 hours PRN Nausea and/or Vomiting       Complete Blood Count in AM (12.21.24 @ 06:30)   Nucleated RBC: 0 /100 WBCs  WBC Count: 11.88 K/uL  RBC Count: 4.34 M/uL  Hemoglobin: 8.8 g/dL  Hematocrit: 29.4 %  Mean Cell Volume: 67.7 fl  Mean Cell Hemoglobin: 20.3 pg  Mean Cell Hemoglobin Conc: 29.9 g/dL  Red Cell Distrib Width: 26.3 %  Platelet Count - Automated: 311 K/uL         ACC: [id number] EXAM:  MR PELVIS OC WAW IC   ORDERED BY: JUMANA RAMESH     PROCEDURE DATE:  12/19/2024          INTERPRETATION:  CLINICAL INFORMATION: Further assessment of right   adnexal findings identified on CT    COMPARISON: CT abdomen and pelvis 12/17/2024    CONTRAST/COMPLICATIONS:  IV Contrast: Gadavist  7.0 cc administered   0.5 cc discarded  Oral Contrast: NONE  .    PROCEDURE:  MRI of the pelvis was performed.      FINDINGS:  UTERUS: Approximately measures 7 x 3 x 4 cm. No discrete myometrial mass  ENDOMETRIUM: Poorly defined  JUNCTIONAL ZONE: Thickened consistent with adenomyosis    RIGHT OVARY: Atrophic  LEFT OVARY: Atrophic  ADNEXA: Within normal limits.    BLADDER: Minimally distended    LYMPH NODES: No pelvic lymphadenopathy.    VISUALIZED PORTIONS:  BOWEL: Unobstructed bowel. Transmural rectosigmoid tumor involves a   length of approximately 12 cm  PERITONEUM: Loculated fluid within the pelvic cul-de-sac also encircling   the right ovary corresponds with the CT observation. Omental   carcinomatosis. Small volume pelvic ascites. Peritoneal metastases along   cul-de-sac.  VESSELS: Within normal limits.  ABDOMINAL WALL: Tiny fat-containing umbilical hernia.  BONES: L4-L5 degenerative disc disease    IMPRESSION:  Transmural rectosigmoid tumor involves a length of approximately 12 cm.   Recommend colonoscopy with tissue sampling to confirm diagnosis    Loculated fluid within the pelvic cul-de-sac also encircling the right   ovary corresponds with the CT observation    Omental carcinomatosis and peritoneal metastatic disease. Small volume   pelvic ascites

## 2024-12-21 NOTE — PROGRESS NOTE ADULT - ASSESSMENT
1. Abdominal pain  2. Bloody diarrhea stopped  3. Iron deficiency anemia  4. Ulcerative colitis   5. Ascites  6. S/p MRI  7. Peritoneal carcinomatosis  8. Transmural rectosigmoid tumor involve  9. R/o ovarian cancer  10. Elevated     Suggestions:    1. Monitor H/H  2. Transfuse PRBC as needed  3. IR scheduled for omental biosy   4. Solumedrol 60mg q6h  5. Colonoscopy  6. GYN Follow up  7. Diet as tolerated   8. Protonix 40mg daily  9. Avoid NSAID  10. Iron supplement  11. DVT prophylaxis

## 2024-12-21 NOTE — PROGRESS NOTE ADULT - SUBJECTIVE AND OBJECTIVE BOX
Patient is a 54y old  Female who presents with a chief complaint of Anemia     (20 Dec 2024 15:17)    pt seen in icu [  ], reg med floor [ x  ], bed [ x ], chair at bedside [   ], a+o x3 [ x ], lethargic [  ],    nad [ x ]        Allergies    No Known Allergies        Vitals    T(F): 97.7 (12-21-24 @ 04:57), Max: 98.6 (12-20-24 @ 14:08)  HR: 74 (12-21-24 @ 04:57) (74 - 91)  BP: 135/81 (12-21-24 @ 04:57) (119/71 - 137/79)  RR: 17 (12-21-24 @ 04:57) (17 - 17)  SpO2: 95% (12-21-24 @ 04:57) (95% - 98%)  Wt(kg): --  CAPILLARY BLOOD GLUCOSE          Labs                          9.0    8.21  )-----------( 357      ( 20 Dec 2024 06:14 )             29.9       12-20    137  |  104  |  14  ----------------------------<  180[H]  3.6   |  25  |  0.56    Ca    8.8      20 Dec 2024 06:14  Phos  4.2     12-20  Mg     2.5     12-20    TPro  7.8  /  Alb  3.2[L]  /  TBili  0.3  /  DBili  x   /  AST  11  /  ALT  14  /  AlkPhos  63  12-20            .Blood BLOOD  12-19 @ 05:25   No growth at 24 hours  --  --      .Blood BLOOD  12-19 @ 05:20   No growth at 24 hours  --  --          Radiology Results      Meds    MEDICATIONS  (STANDING):  albuterol/ipratropium for Nebulization 3 milliLiter(s) Nebulizer every 6 hours  enoxaparin Injectable 40 milliGRAM(s) SubCutaneous every 24 hours  methylPREDNISolone sodium succinate Injectable 60 milliGRAM(s) IV Push every 6 hours  montelukast 10 milliGRAM(s) Oral daily  oseltamivir 75 milliGRAM(s) Oral two times a day  pantoprazole    Tablet 40 milliGRAM(s) Oral before breakfast      MEDICATIONS  (PRN):  acetaminophen     Tablet .. 650 milliGRAM(s) Oral every 6 hours PRN Temp greater or equal to 38C (100.4F), Mild Pain (1 - 3)  guaiFENesin Oral Liquid (Sugar-Free) 100 milliGRAM(s) Oral every 6 hours PRN Cough  ondansetron Injectable 4 milliGRAM(s) IV Push every 8 hours PRN Nausea and/or Vomiting      Physical Exam    Neuro :  no focal deficits  Respiratory: CTA B/L  CV: RRR, S1S2, no murmurs,   Abdominal: Soft, NT, ND +BS,  Extremities: No edema, + peripheral pulses      ASSESSMENT    symptomatic anemia,   omental carcinomatosis likely metastatic disease,   recto sigmoid mass likely malignancy   adnexal mass r/o ovarian or tubular malig,   hypokalemia,   influenza b,   h/o  asthma   UC [diagnosed in 2006]         PLAN    s/p transfuse 2 units prbc,   hgb improved   ca 125, ca19-9, cea, elevated noted    heme onc f/u   iron studies with low iron noted     ·daily cbc  ·transfusion for hb<7.  pt with Omental carcinomatosis  ·This is highly suspicious for Gynecologic malignancy ie ovarian cancer with large right adnexal mass  · 165, CEA 14,  527.    ·Patient needs a CT chest to complete workup.  ·Patient also found to have sigmoid colon thickening concerning for possible colon cancer especially with her history of UC.  ·Will need a colonoscopy by GI  ·Gyn onc consult  ·Also would need a biopsy of the omental nodule for tissue diagnosis as this could be separate from the sigmoid colon thickening  gi f/u   pt with Ulcerative colitis exacerbation   Solumedrol 60mg q6h   rectosigmoid mass  f/u Colonoscopy  protonix   surg f/u   - No acute intervention   - F/U GI for colonoscopy   gyn onc f/u   cystic right adnexal lesion, possibly ovarian or tubular malignancy.   Possible GYN malignancy at R adnexa   mri pelv with Transmural rectosigmoid tumor involves a length of approximately 12 cm.   Recommend colonoscopy with tissue sampling to confirm diagnosis  Loculated fluid within the pelvic cul-de-sac also encircling the right   ovary corresponds with the CT observation. Omental carcinomatosis and peritoneal   metastatic disease. Small volume pelvic ascites noted above.    supplemented potassium,   cont tamiflu x 4/5 days,   pulm f/u    cont bronchodilators   cont singulair   cont Breo Ellipta  cont current meds         Patient is a 54y old  Female who presents with a chief complaint of Anemia     (20 Dec 2024 15:17)    pt seen in icu [  ], reg med floor [ x  ], bed [ x ], chair at bedside [   ], a+o x3 [ x ], lethargic [  ],    nad [ x ]        Allergies    No Known Allergies        Vitals    T(F): 97.7 (12-21-24 @ 04:57), Max: 98.6 (12-20-24 @ 14:08)  HR: 74 (12-21-24 @ 04:57) (74 - 91)  BP: 135/81 (12-21-24 @ 04:57) (119/71 - 137/79)  RR: 17 (12-21-24 @ 04:57) (17 - 17)  SpO2: 95% (12-21-24 @ 04:57) (95% - 98%)  Wt(kg): --  CAPILLARY BLOOD GLUCOSE          Labs                          9.0    8.21  )-----------( 357      ( 20 Dec 2024 06:14 )             29.9       12-20    137  |  104  |  14  ----------------------------<  180[H]  3.6   |  25  |  0.56    Ca    8.8      20 Dec 2024 06:14  Phos  4.2     12-20  Mg     2.5     12-20    TPro  7.8  /  Alb  3.2[L]  /  TBili  0.3  /  DBili  x   /  AST  11  /  ALT  14  /  AlkPhos  63  12-20            .Blood BLOOD  12-19 @ 05:25   No growth at 24 hours  --  --      .Blood BLOOD  12-19 @ 05:20   No growth at 24 hours  --  --          Radiology Results      Meds    MEDICATIONS  (STANDING):  albuterol/ipratropium for Nebulization 3 milliLiter(s) Nebulizer every 6 hours  enoxaparin Injectable 40 milliGRAM(s) SubCutaneous every 24 hours  methylPREDNISolone sodium succinate Injectable 60 milliGRAM(s) IV Push every 6 hours  montelukast 10 milliGRAM(s) Oral daily  oseltamivir 75 milliGRAM(s) Oral two times a day  pantoprazole    Tablet 40 milliGRAM(s) Oral before breakfast      MEDICATIONS  (PRN):  acetaminophen     Tablet .. 650 milliGRAM(s) Oral every 6 hours PRN Temp greater or equal to 38C (100.4F), Mild Pain (1 - 3)  guaiFENesin Oral Liquid (Sugar-Free) 100 milliGRAM(s) Oral every 6 hours PRN Cough  ondansetron Injectable 4 milliGRAM(s) IV Push every 8 hours PRN Nausea and/or Vomiting      Physical Exam    Neuro :  no focal deficits  Respiratory: CTA B/L  CV: RRR, S1S2, no murmurs,   Abdominal: Soft, NT, ND +BS,  Extremities: No edema, + peripheral pulses      ASSESSMENT    symptomatic anemia,   omental carcinomatosis likely metastatic disease,   recto sigmoid mass likely malignancy   adnexal mass r/o ovarian or tubular malig,   hypokalemia,   influenza b,   h/o  asthma   UC [diagnosed in 2006]         PLAN    s/p transfuse 2 units prbc,   hgb improved   ca 125, ca19-9, cea, elevated noted    heme onc f/u   iron studies with low iron noted     ·daily cbc  ·transfusion for hb<7.  pt with Omental carcinomatosis  ·This is highly suspicious for Gynecologic malignancy ie ovarian cancer with large right adnexal mass  · 165, CEA 14,  527.    ·Patient needs a CT chest to complete workup.  ·Patient also found to have sigmoid colon thickening concerning for possible colon cancer especially with her history of UC.  ·Will need a colonoscopy by GI  ·Gyn onc consult  ·ir biopsy of the omental nodule on monday   gi f/u   pt with Ulcerative colitis exacerbation   Solumedrol 60mg q6h x5 days  rectosigmoid mass  f/u Colonoscopy after 5 days of isolation  protonix   surg f/u   - No acute intervention   - F/U GI for colonoscopy   gyn onc f/u   cystic right adnexal lesion, possibly ovarian or tubular malignancy.   Possible GYN malignancy at R adnexa   mri pelv with Transmural rectosigmoid tumor involves a length of approximately 12 cm.   Recommend colonoscopy with tissue sampling to confirm diagnosis  Loculated fluid within the pelvic cul-de-sac also encircling the right   ovary corresponds with the CT observation. Omental carcinomatosis and peritoneal   metastatic disease. Small volume pelvic ascites noted above.    supplemented potassium,   cont tamiflu x 4/5 days,   pulm f/u    cont bronchodilators   cont singulair   cont Breo Ellipta  cont current meds

## 2024-12-21 NOTE — PROGRESS NOTE ADULT - NEUROLOGICAL
All results normal or without significant abnormalities sensation intact/responds to pain/responds to verbal commands/no spontaneous movement details…

## 2024-12-21 NOTE — PROGRESS NOTE ADULT - SUBJECTIVE AND OBJECTIVE BOX
Patient seen at bedside resting comfortably offers no new complaints. Reports she has mild cough but otherwise is feeling well. Reports continued blood with bowel movements     Vital Signs Last 24 Hrs  T(C): 36.6 (21 Dec 2024 13:47), Max: 36.6 (21 Dec 2024 13:47)  T(F): 97.9 (21 Dec 2024 13:47), Max: 97.9 (21 Dec 2024 13:47)  HR: 75 (21 Dec 2024 13:47) (74 - 91)  BP: 148/87 (21 Dec 2024 13:47) (135/81 - 148/87)  BP(mean): 103 (21 Dec 2024 13:47) (103 - 103)  RR: 18 (21 Dec 2024 13:47) (17 - 18)  SpO2: 99% (21 Dec 2024 13:47) (95% - 99%)  Parameters below as of 21 Dec 2024 13:47  Patient On (Oxygen Delivery Method): room air    Gen: A&O x 3, NAD  Chest: CTA B/L  Cardiac: S1,S2  RRR  Abdomen: soft, nontender, nondistended  Extremities: Nontender, no worsening edema                        8.8    11.88 )-----------( 311      ( 21 Dec 2024 06:30 )             29.4     12-21    136  |  104  |  11  ----------------------------<  166[H]  3.7   |  25  |  0.47[L]    Ca    8.9      21 Dec 2024 06:30  Phos  4.2     12-20  Mg     2.5     12-20    TPro  7.2  /  Alb  3.0[L]  /  TBili  0.3  /  DBili  x   /  AST  10  /  ALT  16  /  AlkPhos  59  12-21

## 2024-12-21 NOTE — PROGRESS NOTE ADULT - PROBLEM SELECTOR PLAN 1
- Continue recommendations per IM, GI, Pulm and Heme/Onc   - continue Tamiflu   - colonoscopy s/p influenza isolation, likely 12/24  - for IR biopsy of omental node 12/23  - Continue Asthma regimen   - Will continue to follow pending results of colonoscopy and biopsy   - Notified Dr. Soni, on call GYN ONC attending

## 2024-12-22 LAB
ALBUMIN SERPL ELPH-MCNC: 3.1 G/DL — LOW (ref 3.5–5)
ALP SERPL-CCNC: 60 U/L — SIGNIFICANT CHANGE UP (ref 40–120)
ALT FLD-CCNC: 24 U/L DA — SIGNIFICANT CHANGE UP (ref 10–60)
ANION GAP SERPL CALC-SCNC: 7 MMOL/L — SIGNIFICANT CHANGE UP (ref 5–17)
APTT BLD: 26.5 SEC — SIGNIFICANT CHANGE UP (ref 24.5–35.6)
AST SERPL-CCNC: 14 U/L — SIGNIFICANT CHANGE UP (ref 10–40)
BILIRUB SERPL-MCNC: 0.3 MG/DL — SIGNIFICANT CHANGE UP (ref 0.2–1.2)
BUN SERPL-MCNC: 12 MG/DL — SIGNIFICANT CHANGE UP (ref 7–18)
CALCIUM SERPL-MCNC: 9.1 MG/DL — SIGNIFICANT CHANGE UP (ref 8.4–10.5)
CHLORIDE SERPL-SCNC: 101 MMOL/L — SIGNIFICANT CHANGE UP (ref 96–108)
CO2 SERPL-SCNC: 27 MMOL/L — SIGNIFICANT CHANGE UP (ref 22–31)
CREAT SERPL-MCNC: 0.56 MG/DL — SIGNIFICANT CHANGE UP (ref 0.5–1.3)
EGFR: 108 ML/MIN/1.73M2 — SIGNIFICANT CHANGE UP
GLUCOSE SERPL-MCNC: 158 MG/DL — HIGH (ref 70–99)
HCG SERPL-ACNC: 3 MIU/ML — SIGNIFICANT CHANGE UP
HCT VFR BLD CALC: 29.6 % — LOW (ref 34.5–45)
HGB BLD-MCNC: 8.9 G/DL — LOW (ref 11.5–15.5)
MCHC RBC-ENTMCNC: 20.5 PG — LOW (ref 27–34)
MCHC RBC-ENTMCNC: 30.1 G/DL — LOW (ref 32–36)
MCV RBC AUTO: 68 FL — LOW (ref 80–100)
NRBC # BLD: 0 /100 WBCS — SIGNIFICANT CHANGE UP (ref 0–0)
PLATELET # BLD AUTO: 295 K/UL — SIGNIFICANT CHANGE UP (ref 150–400)
POTASSIUM SERPL-MCNC: 3.9 MMOL/L — SIGNIFICANT CHANGE UP (ref 3.5–5.3)
POTASSIUM SERPL-SCNC: 3.9 MMOL/L — SIGNIFICANT CHANGE UP (ref 3.5–5.3)
PROT SERPL-MCNC: 7.4 G/DL — SIGNIFICANT CHANGE UP (ref 6–8.3)
RBC # BLD: 4.35 M/UL — SIGNIFICANT CHANGE UP (ref 3.8–5.2)
RBC # FLD: 26.5 % — HIGH (ref 10.3–14.5)
SODIUM SERPL-SCNC: 135 MMOL/L — SIGNIFICANT CHANGE UP (ref 135–145)
WBC # BLD: 11.8 K/UL — HIGH (ref 3.8–10.5)
WBC # FLD AUTO: 11.8 K/UL — HIGH (ref 3.8–10.5)

## 2024-12-22 RX ADMIN — OSELTAMIVIR 75 MILLIGRAM(S): 75 CAPSULE ORAL at 17:46

## 2024-12-22 RX ADMIN — METHYLPREDNISOLONE 60 MILLIGRAM(S): 4 TABLET ORAL at 12:31

## 2024-12-22 RX ADMIN — METHYLPREDNISOLONE 60 MILLIGRAM(S): 4 TABLET ORAL at 23:54

## 2024-12-22 RX ADMIN — IPRATROPIUM BROMIDE AND ALBUTEROL SULFATE 3 MILLILITER(S): .5; 2.5 SOLUTION RESPIRATORY (INHALATION) at 09:24

## 2024-12-22 RX ADMIN — OSELTAMIVIR 75 MILLIGRAM(S): 75 CAPSULE ORAL at 05:12

## 2024-12-22 RX ADMIN — METHYLPREDNISOLONE 60 MILLIGRAM(S): 4 TABLET ORAL at 17:47

## 2024-12-22 RX ADMIN — PANTOPRAZOLE 40 MILLIGRAM(S): 40 TABLET, DELAYED RELEASE ORAL at 05:13

## 2024-12-22 RX ADMIN — ENOXAPARIN SODIUM 40 MILLIGRAM(S): 60 INJECTION INTRAVENOUS; SUBCUTANEOUS at 05:12

## 2024-12-22 RX ADMIN — MONTELUKAST SODIUM 10 MILLIGRAM(S): 10 TABLET, FILM COATED ORAL at 12:30

## 2024-12-22 RX ADMIN — Medication 100 MILLIGRAM(S): at 09:25

## 2024-12-22 RX ADMIN — IRON SUCROSE 110 MILLIGRAM(S): 20 INJECTION, SOLUTION INTRAVENOUS at 12:30

## 2024-12-22 RX ADMIN — IPRATROPIUM BROMIDE AND ALBUTEROL SULFATE 3 MILLILITER(S): .5; 2.5 SOLUTION RESPIRATORY (INHALATION) at 15:31

## 2024-12-22 RX ADMIN — IPRATROPIUM BROMIDE AND ALBUTEROL SULFATE 3 MILLILITER(S): .5; 2.5 SOLUTION RESPIRATORY (INHALATION) at 20:27

## 2024-12-22 RX ADMIN — METHYLPREDNISOLONE 60 MILLIGRAM(S): 4 TABLET ORAL at 05:12

## 2024-12-22 RX ADMIN — Medication 100 MILLIGRAM(S): at 17:46

## 2024-12-22 NOTE — PROGRESS NOTE ADULT - ENMT
Ortho Sports Medicine Shoulder New Patient Visit     Assesment:   51 y.o. male right shoulder pain, prior TSA with Billy Bone in 2008. Concern for aseptic loosening and poly wear; Right elbow lateral epicondylitis    Plan:  The patient's diagnosis and treatment were discussed at length today. We discussed no treatment, non-operative treatment, and operative treatment.    Discussed XR findings of possible superior migration of the prosthesis and osteoylsis of the proximal humeral calcar. Discussed workup for septic vs aseptic loosening including ESR/CRP, CBC, and CT scan. Recommend patient return to Dr. Bone for evaluation given his expertise in the field and patient known to him. If he decides to stay in the [facility] area would refer to Dr. Tucker or Anisa.       Regarding his right elbow, he has lateral epicondylitis and would benefit from a counterforce brace and physical therapy, which were provided.    He can follow up PRN    Conservative treatment:    Ice to shoulder 1-2 times daily, for 20 minutes at a time.  PT for ROM and strengthening to shoulder, rotator cuff, scapular stabilizers.  Home exercise program for shoulder, including ROM and strenthening.  Instructions given to patient of what exercises to perform.      Imaging:    All imaging from today was reviewed by myself and explained to the patient.       Injection:    No Injection planned at this time.      Surgery:     No surgery is recommended at this point, continue with conservative treatment plan as noted.      Follow up:    No follow-ups on file.        Chief Complaint   Patient presents with    Right Shoulder - Pain       History of Present Illness:    The patient is a 51 y.o., with a history of prior right shoulder humeral head resurfacing followed by conversion to total shoulder arthroplasty with Dr. Bone in 2008.  He states the conversion was done for aseptic reasons and secondary to glenoid based pain.  Patient presents today  details… with ongoing discomfort in the right shoulder over the last 2 to 3 months.  Denies any new trauma or injury.  Denies any fevers or chills.  Denies redness or drainage from his incisions.  Has pain with attempted overhead activities and prolonged use of the arm.  Presents seeking treatment recommendations moving forward.  Is an active individual and also describes elbow pain that occurs laterally and is exacerbated by supination and pronation of the arm.      Shoulder Surgical History:  History of prior right-sided humeral head resurfacing and later revision to subsequent total shoulder arthroplasty with Dr. Bone in 2008.    Past Medical, Social and Family History:  Past Medical History:   Diagnosis Date    Depression     Hypertension      Past Surgical History:   Procedure Laterality Date    KNEE ARTHROSCOPY Right     SHOULDER ARTHROSCOPY Right      No Known Allergies  Current Outpatient Medications on File Prior to Visit   Medication Sig Dispense Refill    atorvastatin (LIPITOR) 20 mg tablet every 24 hours      buPROPion (WELLBUTRIN XL) 150 mg 24 hr tablet Take 150 mg by mouth every 24 hours      lisinopril (ZESTRIL) 10 mg tablet Take 10 mg by mouth every 24 hours      omeprazole (PriLOSEC) 40 MG capsule Take 1 capsule by mouth every 24 hours       No current facility-administered medications on file prior to visit.     Social History     Socioeconomic History    Marital status: Unknown     Spouse name: Not on file    Number of children: Not on file    Years of education: Not on file    Highest education level: Not on file   Occupational History    Not on file   Tobacco Use    Smoking status: Never     Passive exposure: Never    Smokeless tobacco: Never   Substance and Sexual Activity    Alcohol use: Yes    Drug use: Never    Sexual activity: Not on file   Other Topics Concern    Not on file   Social History Narrative    Not on file     Social Determinants of Health     Financial Resource Strain: Not on file  "  Food Insecurity: Not on file   Transportation Needs: Not on file   Physical Activity: Not on file   Stress: Not on file   Social Connections: Not on file   Intimate Partner Violence: Not on file   Housing Stability: Not on file         I have reviewed the past medical, surgical, social and family history, medications and allergies as documented in the EMR.    Review of systems: ROS is negative other than that noted in the HPI.  Constitutional: Negative for fatigue and fever.   HENT: Negative for sore throat.    Respiratory: Negative for shortness of breath.    Cardiovascular: Negative for chest pain.   Gastrointestinal: Negative for abdominal pain.   Endocrine: Negative for cold intolerance and heat intolerance.   Genitourinary: Negative for flank pain.   Musculoskeletal: Negative for back pain.   Skin: Negative for rash.   Allergic/Immunologic: Negative for immunocompromised state.   Neurological: Negative for dizziness.   Psychiatric/Behavioral: Negative for agitation.      Physical Exam:    Blood pressure 153/95, pulse 76, resp. rate 17, height 5' 8\" (1.727 m), weight 98.6 kg (217 lb 6.4 oz).    General/Constitutional: NAD, well developed, well nourished  HENT: Normocephalic, atraumatic  CV: Intact distal pulses, regular rate  Resp: No respiratory distress or labored breathing  Lymphatic: No lymphadenopathy palpated  Neuro: Alert and Oriented x 3, no focal deficits  Psych: Normal mood, normal affect, normal judgement, normal behavior  Skin: Warm, dry, no rashes, no erythema      Shoulder focused exam:     Visual inspection of the shoulder demonstrates a healing incision.  No evidence of erythema or drainage.  Motor function demonstrates forward flexion to 110 degrees before pain, abduction to 100, external Tatian with the arm at the side is 25, internal rotation to the lower thoracic  Rotator cuff strength is 4+ to 5 resisted forward flexion, abduction, external, internal  Positive belly press with wristband " during subscap testing  Motor and sensory function intact.  Compartments are soft and compressible.  Cap refills brisk.  Hand is warm and well-perfused.      UE NV Exam: +2 Radial pulses bilaterally  Sensation intact to light touch C5-T1 bilaterally, Radial/median/ulnar nerve motor intact      Bilateral elbow, wrist, and and forearm ROM full, painless with passive ROM, no ttp or crepitance throughout extremities below shoulder joint    Cervical ROM is full without pain, numbness or tingling      Shoulder Imaging    X-rays of the right shoulder demonstrate evidence of prior cemented all polyethylene total shoulder arthroplasty with a press-fit humeral component.  There is osteolysis and resorption of the proximal medial metadiaphyseal humerus at the calcar region concerning for osteolysis.  There is a possible pedestal distally concerning for aseptic loosening.  There is superior migration of the humerus relative to the glenoid.  Do not currently have radiologist interpretation however I will follow-up once 1 is performed.      Scribe Attestation      I,:  Gonzales Jay am acting as a scribe while in the presence of the attending physician.:       I,:  Trevor York DO personally performed the services described in this documentation    as scribed in my presence.:            nose/mouth/pharynx/neck

## 2024-12-22 NOTE — PROGRESS NOTE ADULT - SUBJECTIVE AND OBJECTIVE BOX
Patient is a 54y old  Female who presents with a chief complaint of abdominal pain (21 Dec 2024 14:55)    pt seen in icu [  ], reg med floor [ x  ], bed [ x ], chair at bedside [   ], a+o x3 [ x ], lethargic [  ],    nad [ x ]        Allergies    No Known Allergies        Vitals    T(F): 97.9 (12-22-24 @ 05:18), Max: 97.9 (12-21-24 @ 13:47)  HR: 65 (12-22-24 @ 05:18) (65 - 75)  BP: 127/75 (12-22-24 @ 05:18) (127/75 - 148/87)  RR: 17 (12-22-24 @ 05:18) (17 - 18)  SpO2: 94% (12-22-24 @ 05:18) (94% - 99%)  Wt(kg): --  CAPILLARY BLOOD GLUCOSE          Labs                          8.9    11.80 )-----------( 295      ( 22 Dec 2024 05:40 )             29.6       12-22    135  |  101  |  12  ----------------------------<  158[H]  3.9   |  27  |  0.56    Ca    9.1      22 Dec 2024 05:40    TPro  7.4  /  Alb  3.1[L]  /  TBili  0.3  /  DBili  x   /  AST  14  /  ALT  24  /  AlkPhos  60  12-22            .Blood BLOOD  12-19 @ 05:25   No growth at 48 Hours  --  --      .Blood BLOOD  12-19 @ 05:20   No growth at 48 Hours  --  --          Radiology Results      Meds    MEDICATIONS  (STANDING):  albuterol/ipratropium for Nebulization 3 milliLiter(s) Nebulizer every 6 hours  enoxaparin Injectable 40 milliGRAM(s) SubCutaneous every 24 hours  iron sucrose IVPB 200 milliGRAM(s) IV Intermittent every 24 hours  methylPREDNISolone sodium succinate Injectable 60 milliGRAM(s) IV Push every 6 hours  montelukast 10 milliGRAM(s) Oral daily  oseltamivir 75 milliGRAM(s) Oral two times a day  pantoprazole    Tablet 40 milliGRAM(s) Oral before breakfast      MEDICATIONS  (PRN):  acetaminophen     Tablet .. 650 milliGRAM(s) Oral every 6 hours PRN Temp greater or equal to 38C (100.4F), Mild Pain (1 - 3)  guaiFENesin Oral Liquid (Sugar-Free) 100 milliGRAM(s) Oral every 6 hours PRN Cough  ondansetron Injectable 4 milliGRAM(s) IV Push every 8 hours PRN Nausea and/or Vomiting      Physical Exam    Neuro :  no focal deficits  Respiratory: CTA B/L  CV: RRR, S1S2, no murmurs,   Abdominal: Soft, NT, ND +BS,  Extremities: No edema, + peripheral pulses      ASSESSMENT    symptomatic anemia,   omental carcinomatosis likely metastatic disease,   recto sigmoid mass likely malignancy   adnexal mass r/o ovarian or tubular malig,   hypokalemia,   influenza b,   h/o  asthma   UC [diagnosed in 2006]         PLAN    s/p transfuse 2 units prbc,   hgb improved   ca 125, ca19-9, cea, elevated noted    heme onc f/u   iron studies with low iron noted     ·daily cbc  ·transfusion for hb<7.  pt with Omental carcinomatosis  ·This is highly suspicious for Gynecologic malignancy ie ovarian cancer with large right adnexal mass  · 165, CEA 14,  527.    ·Patient needs a CT chest to complete workup.  ·Patient also found to have sigmoid colon thickening concerning for possible colon cancer especially with her history of UC.  ·Will need a colonoscopy by GI  ·Gyn onc consult  ·ir biopsy of the omental nodule on monday   gi f/u   pt with Ulcerative colitis exacerbation   Solumedrol 60mg q6h x5 days  rectosigmoid mass  f/u Colonoscopy after 5 days of isolation  protonix   surg f/u   - No acute intervention   - F/U GI for colonoscopy   gyn onc f/u   cystic right adnexal lesion, possibly ovarian or tubular malignancy.   Possible GYN malignancy at R adnexa   mri pelv with Transmural rectosigmoid tumor involves a length of approximately 12 cm.   Recommend colonoscopy with tissue sampling to confirm diagnosis  Loculated fluid within the pelvic cul-de-sac also encircling the right   ovary corresponds with the CT observation. Omental carcinomatosis and peritoneal   metastatic disease. Small volume pelvic ascites noted above.    supplemented potassium,   cont tamiflu x 4/5 days,   pulm f/u    cont bronchodilators   cont singulair   cont Breo Ellipta  cont current meds         Patient is a 54y old  Female who presents with a chief complaint of abdominal pain (21 Dec 2024 14:55)    pt seen in icu [  ], reg med floor [ x  ], bed [ x ], chair at bedside [   ], a+o x3 [ x ], lethargic [  ],    nad [ x ]        Allergies    No Known Allergies        Vitals    T(F): 97.9 (12-22-24 @ 05:18), Max: 97.9 (12-21-24 @ 13:47)  HR: 65 (12-22-24 @ 05:18) (65 - 75)  BP: 127/75 (12-22-24 @ 05:18) (127/75 - 148/87)  RR: 17 (12-22-24 @ 05:18) (17 - 18)  SpO2: 94% (12-22-24 @ 05:18) (94% - 99%)  Wt(kg): --  CAPILLARY BLOOD GLUCOSE          Labs                          8.9    11.80 )-----------( 295      ( 22 Dec 2024 05:40 )             29.6       12-22    135  |  101  |  12  ----------------------------<  158[H]  3.9   |  27  |  0.56    Ca    9.1      22 Dec 2024 05:40    TPro  7.4  /  Alb  3.1[L]  /  TBili  0.3  /  DBili  x   /  AST  14  /  ALT  24  /  AlkPhos  60  12-22            .Blood BLOOD  12-19 @ 05:25   No growth at 48 Hours  --  --      .Blood BLOOD  12-19 @ 05:20   No growth at 48 Hours  --  --          Radiology Results    < from: CT Chest No Cont (12.21.24 @ 19:28) >  FINDINGS:    AIRWAYS AND LUNGS: The central tracheobronchial tree is patent.    Scattered linear atelectasis/scarring.    MEDIASTINUM AND PLEURA: There are no enlarged mediastinal, hilar or   axillary lymph nodes. The visualized portion of the thyroid gland is   unremarkable. There is no pleural effusion. There is no pneumothorax.    HEART AND VESSELS: The heart is normal in size.  There are no   atherosclerotic calcifications of the aorta.  There is no pericardial   effusion.    UPPER ABDOMEN: Images of the upper abdomen demonstrate ascites.   Mesenteric nodularity.    BONES AND SOFT TISSUES: The bones are unremarkable.  The soft tissues are   unremarkable.    IMPRESSION:  Scattered linear atelectasis/scarring. Lungs otherwise clear.    < end of copied text >        Meds    MEDICATIONS  (STANDING):  albuterol/ipratropium for Nebulization 3 milliLiter(s) Nebulizer every 6 hours  enoxaparin Injectable 40 milliGRAM(s) SubCutaneous every 24 hours  iron sucrose IVPB 200 milliGRAM(s) IV Intermittent every 24 hours  methylPREDNISolone sodium succinate Injectable 60 milliGRAM(s) IV Push every 6 hours  montelukast 10 milliGRAM(s) Oral daily  oseltamivir 75 milliGRAM(s) Oral two times a day  pantoprazole    Tablet 40 milliGRAM(s) Oral before breakfast      MEDICATIONS  (PRN):  acetaminophen     Tablet .. 650 milliGRAM(s) Oral every 6 hours PRN Temp greater or equal to 38C (100.4F), Mild Pain (1 - 3)  guaiFENesin Oral Liquid (Sugar-Free) 100 milliGRAM(s) Oral every 6 hours PRN Cough  ondansetron Injectable 4 milliGRAM(s) IV Push every 8 hours PRN Nausea and/or Vomiting      Physical Exam    Neuro :  no focal deficits  Respiratory: CTA B/L  CV: RRR, S1S2, no murmurs,   Abdominal: Soft, NT, ND +BS,  Extremities: No edema, + peripheral pulses      ASSESSMENT    symptomatic anemia,   omental carcinomatosis likely metastatic disease,   recto sigmoid mass likely malignancy   adnexal mass r/o ovarian or tubular malig,   hypokalemia,   influenza b,   h/o  asthma   UC [diagnosed in 2006]         PLAN    s/p transfuse 2 units prbc,   hgb improved   ca 125, ca19-9, cea, elevated noted    heme onc f/u   iron studies with low iron noted     ·daily cbc  ·transfusion for hb<7.  pt with Omental carcinomatosis  ·This is highly suspicious for Gynecologic malignancy ie ovarian cancer with large right adnexal mass  · 165, CEA 14,  527 noted.    ·CT chest with Scattered linear atelectasis/scarring. Lungs otherwise clear noted above.     ·Patient also found to have sigmoid colon thickening concerning for possible colon cancer especially with her history of UC.  ·ir biopsy of the omental nodule in am    keep patient NPO after midnight on Sunday.   On Monday send early AM labs including CBC, BMP, and PT, INR / PTT.    Hold all anticoagulation, NSAIDS, and antiplatelet medication.    Please place an IR Procedure order.  gi f/u   pt with Ulcerative colitis exacerbation   d/c Solumedrol 60mg q6h in am  rectosigmoid mass  f/u Colonoscopy after 5 days of isolation  protonix   surg f/u   - No acute intervention   - F/U GI for colonoscopy   gyn onc f/u   cystic right adnexal lesion, possibly ovarian or tubular malignancy.   Possible GYN malignancy at R adnexa   - Will continue to follow pending results of colonoscopy and biopsy   mri pelv with Transmural rectosigmoid tumor involves a length of approximately 12 cm.   Recommend colonoscopy with tissue sampling to confirm diagnosis  Loculated fluid within the pelvic cul-de-sac also encircling the right   ovary corresponds with the CT observation. Omental carcinomatosis and peritoneal   metastatic disease. Small volume pelvic ascites noted      supplemented potassium,   cont tamiflu x 5/5 days,   pulm f/u    cont bronchodilators   cont singulair   cont Breo Ellipta  cont current meds

## 2024-12-22 NOTE — PROGRESS NOTE ADULT - SUBJECTIVE AND OBJECTIVE BOX
[   ] ICU                                          [   ] CCU                                      [ X  ] Medical Floor    Patient is a 54 year old female with abdominal pain. GI consulted to evaluate.          54 year old female from home, with past medical history significant for asthma and Ulcerative Colitis presented to the emergency room with 2 weeks history of progressively worsening intermittent 8/10 intensity non radiating sharp lower abdominal pain associated with bloody watery diarrhea associated with nausea, non bloody vomiting, headache and loss of appetite.      Patient appears comfortable. No new complaints reported, No nausea, vomiting, hematemesis, hematochezia, melena, fever, chills, chest pain, SOB, cough or diarrhea reported.       Prior Colonoscopy:  2022      PAST MEDICAL HISTORY    Asthma    Ulcerative colitis        PAST SURGICAL HISTORY    No significant past surgical history reported        Allergies    No Known Allergies    Intolerances  None         SOCIAL HISTORY  Advanced Directives:       [ X ] Full Code       [  ] DNR  Marital Status:         [  ] M      [  ] S      [  ] D       [  ] W  Children:       [ X ] Yes      [  ] No  Occupation:        [  ] Employed       [X  ] Unemployed       [  ] Retired  Diet:       [ X ] Regular       [  ] PEG feeding          [  ] NG tube feeding  Drug Use:           [X  ] No          [  ] Yes  Alcohol:           [X  ] No             [  ] Yes (socially)         [  ] Yes (chronic)  Tobacco:           [  ] Yes           [ X ] No      FAMILY HISTORY  [ X ] Heart Disease            [ X ] Diabetes             [ X ] HTN             [  ] Colon Cancer             [  ] Stomach Cancer              [  ] Pancreatic Cancer        VITALS   Vital Signs Last 24 Hrs  T(C): 36.6 (12-22-24 @ 05:18), Max: 36.6 (12-21-24 @ 13:47)  T(F): 97.9 (12-22-24 @ 05:18), Max: 97.9 (12-21-24 @ 13:47)  HR: 65 (12-22-24 @ 05:18) (65 - 75)  BP: 127/75 (12-22-24 @ 05:18) (127/75 - 148/87)  BP(mean): 103 (12-21-24 @ 13:47) (103 - 103)  RR: 17 (12-22-24 @ 05:18) (17 - 18)  SpO2: 94% (12-22-24 @ 05:18) (94% - 99%)    MEDICATIONS  (STANDING):  albuterol/ipratropium for Nebulization 3 milliLiter(s) Nebulizer every 6 hours  iron sucrose IVPB 200 milliGRAM(s) IV Intermittent every 24 hours  methylPREDNISolone sodium succinate Injectable 60 milliGRAM(s) IV Push every 6 hours  montelukast 10 milliGRAM(s) Oral daily  oseltamivir 75 milliGRAM(s) Oral two times a day  pantoprazole    Tablet 40 milliGRAM(s) Oral before breakfast    MEDICATIONS  (PRN):  acetaminophen     Tablet .. 650 milliGRAM(s) Oral every 6 hours PRN Temp greater or equal to 38C (100.4F), Mild Pain (1 - 3)  guaiFENesin Oral Liquid (Sugar-Free) 100 milliGRAM(s) Oral every 6 hours PRN Cough  ondansetron Injectable 4 milliGRAM(s) IV Push every 8 hours PRN Nausea and/or Vomiting                            8.9    11.80 )-----------( 295      ( 22 Dec 2024 05:40 )             29.6       12-22    135  |  101  |  12  ----------------------------<  158[H]  3.9   |  27  |  0.56    Ca    9.1      22 Dec 2024 05:40    TPro  7.4  /  Alb  3.1[L]  /  TBili  0.3  /  DBili  x   /  AST  14  /  ALT  24  /  AlkPhos  60  12-22      PTT - ( 22 Dec 2024 05:40 )  PTT:26.5 sec

## 2024-12-23 LAB
ALBUMIN SERPL ELPH-MCNC: 3 G/DL — LOW (ref 3.5–5)
ALP SERPL-CCNC: 58 U/L — SIGNIFICANT CHANGE UP (ref 40–120)
ALT FLD-CCNC: 29 U/L DA — SIGNIFICANT CHANGE UP (ref 10–60)
ANION GAP SERPL CALC-SCNC: 7 MMOL/L — SIGNIFICANT CHANGE UP (ref 5–17)
APTT BLD: 26.2 SEC — SIGNIFICANT CHANGE UP (ref 24.5–35.6)
AST SERPL-CCNC: 14 U/L — SIGNIFICANT CHANGE UP (ref 10–40)
BILIRUB SERPL-MCNC: 0.3 MG/DL — SIGNIFICANT CHANGE UP (ref 0.2–1.2)
BLD GP AB SCN SERPL QL: SIGNIFICANT CHANGE UP
BUN SERPL-MCNC: 14 MG/DL — SIGNIFICANT CHANGE UP (ref 7–18)
CALCIUM SERPL-MCNC: 9 MG/DL — SIGNIFICANT CHANGE UP (ref 8.4–10.5)
CHLORIDE SERPL-SCNC: 102 MMOL/L — SIGNIFICANT CHANGE UP (ref 96–108)
CO2 SERPL-SCNC: 27 MMOL/L — SIGNIFICANT CHANGE UP (ref 22–31)
CREAT SERPL-MCNC: 0.6 MG/DL — SIGNIFICANT CHANGE UP (ref 0.5–1.3)
EGFR: 107 ML/MIN/1.73M2 — SIGNIFICANT CHANGE UP
GLUCOSE SERPL-MCNC: 160 MG/DL — HIGH (ref 70–99)
HCT VFR BLD CALC: 31.7 % — LOW (ref 34.5–45)
HGB BLD-MCNC: 9.4 G/DL — LOW (ref 11.5–15.5)
INR BLD: 0.91 RATIO — SIGNIFICANT CHANGE UP (ref 0.85–1.16)
MAGNESIUM SERPL-MCNC: 2.3 MG/DL — SIGNIFICANT CHANGE UP (ref 1.6–2.6)
MCHC RBC-ENTMCNC: 20.1 PG — LOW (ref 27–34)
MCHC RBC-ENTMCNC: 29.7 G/DL — LOW (ref 32–36)
MCV RBC AUTO: 67.7 FL — LOW (ref 80–100)
NRBC # BLD: 0 /100 WBCS — SIGNIFICANT CHANGE UP (ref 0–0)
PHOSPHATE SERPL-MCNC: 3.3 MG/DL — SIGNIFICANT CHANGE UP (ref 2.5–4.5)
PLATELET # BLD AUTO: 311 K/UL — SIGNIFICANT CHANGE UP (ref 150–400)
POTASSIUM SERPL-MCNC: 3.9 MMOL/L — SIGNIFICANT CHANGE UP (ref 3.5–5.3)
POTASSIUM SERPL-SCNC: 3.9 MMOL/L — SIGNIFICANT CHANGE UP (ref 3.5–5.3)
PROT SERPL-MCNC: 7.4 G/DL — SIGNIFICANT CHANGE UP (ref 6–8.3)
PROTHROM AB SERPL-ACNC: 10.6 SEC — SIGNIFICANT CHANGE UP (ref 9.9–13.4)
RBC # BLD: 4.68 M/UL — SIGNIFICANT CHANGE UP (ref 3.8–5.2)
RBC # FLD: 27.7 % — HIGH (ref 10.3–14.5)
SODIUM SERPL-SCNC: 136 MMOL/L — SIGNIFICANT CHANGE UP (ref 135–145)
WBC # BLD: 10.43 K/UL — SIGNIFICANT CHANGE UP (ref 3.8–10.5)
WBC # FLD AUTO: 10.43 K/UL — SIGNIFICANT CHANGE UP (ref 3.8–10.5)

## 2024-12-23 PROCEDURE — 88172 CYTP DX EVAL FNA 1ST EA SITE: CPT | Mod: 26

## 2024-12-23 PROCEDURE — 20206 BIOPSY MUSCLE PERQ NEEDLE: CPT

## 2024-12-23 PROCEDURE — 88305 TISSUE EXAM BY PATHOLOGIST: CPT | Mod: 26

## 2024-12-23 PROCEDURE — 77012 CT SCAN FOR NEEDLE BIOPSY: CPT | Mod: 26

## 2024-12-23 RX ORDER — POLYETHYLENE GLYCOL 3350, SODIUM SULFATE ANHYDROUS, SODIUM BICARBONATE, SODIUM CHLORIDE, POTASSIUM CHLORIDE 236; 22.74; 6.74; 5.86; 2.97 G/4L; G/4L; G/4L; G/4L; G/4L
4000 POWDER, FOR SOLUTION ORAL ONCE
Refills: 0 | Status: DISCONTINUED | OUTPATIENT
Start: 2024-12-23 | End: 2024-12-24

## 2024-12-23 RX ORDER — LORAZEPAM 1 MG/1
0.5 TABLET ORAL ONCE
Refills: 0 | Status: DISCONTINUED | OUTPATIENT
Start: 2024-12-23 | End: 2024-12-23

## 2024-12-23 RX ADMIN — LORAZEPAM 0.5 MILLIGRAM(S): 1 TABLET ORAL at 12:30

## 2024-12-23 RX ADMIN — IPRATROPIUM BROMIDE AND ALBUTEROL SULFATE 3 MILLILITER(S): .5; 2.5 SOLUTION RESPIRATORY (INHALATION) at 20:52

## 2024-12-23 RX ADMIN — MONTELUKAST SODIUM 10 MILLIGRAM(S): 10 TABLET, FILM COATED ORAL at 16:32

## 2024-12-23 RX ADMIN — METHYLPREDNISOLONE 60 MILLIGRAM(S): 4 TABLET ORAL at 05:06

## 2024-12-23 RX ADMIN — IPRATROPIUM BROMIDE AND ALBUTEROL SULFATE 3 MILLILITER(S): .5; 2.5 SOLUTION RESPIRATORY (INHALATION) at 08:26

## 2024-12-23 RX ADMIN — IRON SUCROSE 110 MILLIGRAM(S): 20 INJECTION, SOLUTION INTRAVENOUS at 16:32

## 2024-12-23 NOTE — PROGRESS NOTE ADULT - PROBLEM SELECTOR PLAN 9
- C/w Lovenox  - C/w Protonix

## 2024-12-23 NOTE — PROCEDURE NOTE - PROCEDURE FINDINGS AND DETAILS
- Peritoneal carcinomatosis again identified and biopsied.  - Core x6 obtained and deemed adequate by the pathologist in attendance.   - Official report to follow.

## 2024-12-23 NOTE — PROGRESS NOTE ADULT - ASSESSMENT
1. Abdominal pain  2. Bloody diarrhea stopped  3. Iron deficiency anemia  4. Ulcerative colitis   5. Ascites  6. S/p MRI  7. Peritoneal carcinomatosis  8. Transmural rectosigmoid tumor involve  9. R/o ovarian cancer  10. Elevated     Suggestions:    1. Monitor H/H  2. Transfuse PRBC as needed  3. IR scheduled for omental biosy   4. Protonix 40mg daily  5. Colonoscopy  6. GYN Follow up  7. Diet as tolerated   8. Avoid NSAID  9. Iron supplement  10. DVT prophylaxis

## 2024-12-23 NOTE — PROGRESS NOTE ADULT - ASSESSMENT
· Assessment	  Ms Jha is a pleasant 54 year old female with history of UC, admitted for abdominal pain and influenza B, found to have multiple omental carcinomatosis, right adnexal mass and sigmoid colon thickening    1. Omental carcinomatosis  MRI showed a large rectosigmoid colon mass  probably colon ca  there are fluids around pelvis and ovary  for omental biopsy today and colonoscopy tomorrow  she is known to have UC for years.  last colonoscopy 2022    2. Anemia  ·	Hb improved from 5.3 to 9.1 from PRBC transfusions.  ·	possibly associated with GI bleed from the UC and sigmoid colon thickening  ·	follow GI recs, pending colonoscopy  ·	daily cbc  ·	transfusion for hb<7  ·	ferritin 8, pls give pt venofer 200mg daily until d/c, can cont in office    Pt wants to go home today. If she does, still with rectal bleeding but hgb stable, may be able to complete further testing as outpt. Would still give her one dose of venofer before d/c if she goes home today. F/u gyn/onc re loculated fluid around the ovary to ensure this is not infectious before dc. Will try to expedite and facilitate testing in office as outpt if she goes home, pls give me a call as pt needs an appt early next week with us. D/w pt and primary team.

## 2024-12-23 NOTE — PROGRESS NOTE ADULT - SUBJECTIVE AND OBJECTIVE BOX
NP Note discussed with  Primary Attending    Patient is a 54y old  Female who presents with a chief complaint of abdominal pain (21 Dec 2024 14:55)      INTERVAL HPI/OVERNIGHT EVENTS: no new complaints    MEDICATIONS  (STANDING):  albuterol/ipratropium for Nebulization 3 milliLiter(s) Nebulizer every 6 hours  iron sucrose IVPB 200 milliGRAM(s) IV Intermittent every 24 hours  montelukast 10 milliGRAM(s) Oral daily  pantoprazole    Tablet 40 milliGRAM(s) Oral before breakfast  polyethylene glycol/electrolyte Solution. 4000 milliLiter(s) Oral once    MEDICATIONS  (PRN):  acetaminophen     Tablet .. 650 milliGRAM(s) Oral every 6 hours PRN Temp greater or equal to 38C (100.4F), Mild Pain (1 - 3)  guaiFENesin Oral Liquid (Sugar-Free) 100 milliGRAM(s) Oral every 6 hours PRN Cough  LORazepam   Injectable 0.5 milliGRAM(s) IV Push once PRN Anxiety  ondansetron Injectable 4 milliGRAM(s) IV Push every 8 hours PRN Nausea and/or Vomiting      __________________________________________________  REVIEW OF SYSTEMS:    CONSTITUTIONAL: No fever,   EYES: no acute visual disturbances  NECK: No pain or stiffness  RESPIRATORY: No cough; No shortness of breath  CARDIOVASCULAR: No chest pain, no palpitations  GASTROINTESTINAL: No pain. No nausea or vomiting; No diarrhea   NEUROLOGICAL: No headache or numbness, no tremors  MUSCULOSKELETAL: No joint pain, no muscle pain  GENITOURINARY: no dysuria, no frequency, no hesitancy  PSYCHIATRY: no depression , no anxiety  ALL OTHER  ROS negative        Vital Signs Last 24 Hrs  T(C): 36.6 (23 Dec 2024 05:05), Max: 37 (22 Dec 2024 13:39)  T(F): 97.8 (23 Dec 2024 05:05), Max: 98.6 (22 Dec 2024 13:39)  HR: 66 (23 Dec 2024 05:05) (66 - 78)  BP: 143/71 (23 Dec 2024 05:05) (116/73 - 143/71)  BP(mean): 96 (23 Dec 2024 05:05) (96 - 96)  RR: 18 (23 Dec 2024 05:05) (18 - 18)  SpO2: 95% (23 Dec 2024 05:05) (95% - 95%)    Parameters below as of 23 Dec 2024 05:05  Patient On (Oxygen Delivery Method): room air        ________________________________________________  PHYSICAL EXAM:  GENERAL: NAD  HEENT: Normocephalic;  conjunctivae and sclerae clear; moist mucous membranes;   NECK : supple  CHEST/LUNG: Clear to auscultation bilaterally with good air entry   HEART: S1 S2  regular; no murmurs, gallops or rubs  ABDOMEN: Soft, Nontender, Nondistended; Bowel sounds present  EXTREMITIES: no cyanosis; no edema; no calf tenderness  SKIN: warm and dry; no rash  NERVOUS SYSTEM:  Awake and alert; Oriented  to place, person and time ; no new deficits    _________________________________________________  LABS:                        9.4    10.43 )-----------( 311      ( 23 Dec 2024 08:00 )             31.7     12-23    136  |  102  |  14  ----------------------------<  160[H]  3.9   |  27  |  0.60    Ca    9.0      23 Dec 2024 08:00  Phos  3.3     12-23  Mg     2.3     12-23    TPro  7.4  /  Alb  3.0[L]  /  TBili  0.3  /  DBili  x   /  AST  14  /  ALT  29  /  AlkPhos  58  12-23    PT/INR - ( 23 Dec 2024 08:00 )   PT: 10.6 sec;   INR: 0.91 ratio         PTT - ( 23 Dec 2024 08:00 )  PTT:26.2 sec  Urinalysis Basic - ( 23 Dec 2024 08:00 )    Color: x / Appearance: x / SG: x / pH: x  Gluc: 160 mg/dL / Ketone: x  / Bili: x / Urobili: x   Blood: x / Protein: x / Nitrite: x   Leuk Esterase: x / RBC: x / WBC x   Sq Epi: x / Non Sq Epi: x / Bacteria: x      CAPILLARY BLOOD GLUCOSE            RADIOLOGY & ADDITIONAL TESTS:  < from: MR Pelvis w/ Oral Cont and w/wo IV Cont (12.19.24 @ 15:11) >    ACC: [id number] EXAM:  MR PELVIS OC WAW IC   ORDERED BY: JUMANA RAMESH     PROCEDURE DATE:  12/19/2024          INTERPRETATION:  CLINICAL INFORMATION: Further assessment of right   adnexal findings identified on CT    COMPARISON: CT abdomen and pelvis 12/17/2024    CONTRAST/COMPLICATIONS:  IV Contrast: Gadavist  7.0 cc administered   0.5 cc discarded  Oral Contrast: NONE  .    PROCEDURE:  MRI of the pelvis was performed.      FINDINGS:  UTERUS: Approximately measures 7 x 3 x 4 cm. No discrete myometrial mass  ENDOMETRIUM: Poorly defined  JUNCTIONAL ZONE: Thickened consistent with adenomyosis    RIGHT OVARY: Atrophic  LEFT OVARY: Atrophic  ADNEXA: Within normal limits.    BLADDER: Minimally distended    LYMPH NODES: No pelvic lymphadenopathy.    VISUALIZED PORTIONS:  BOWEL: Unobstructed bowel. Transmural rectosigmoid tumor involves a   length of approximately 12 cm  PERITONEUM: Loculated fluid within the pelvic cul-de-sac also encircling   the right ovary corresponds with the CT observation. Omental   carcinomatosis. Small volume pelvic ascites. Peritoneal metastases along   cul-de-sac.  VESSELS: Within normal limits.  ABDOMINAL WALL: Tiny fat-containing umbilical hernia.  BONES: L4-L5 degenerative disc disease    IMPRESSION:  Transmural rectosigmoid tumor involves a length of approximately 12 cm.   Recommend colonoscopy with tissue sampling to confirm diagnosis    Loculated fluid within the pelvic cul-de-sac also encircling the right   ovary corresponds with the CT observation    Omental carcinomatosis and peritoneal metastatic disease. Small volume   pelvic ascites          --- End of Report ---            OZZY CORONA MD; Attending Radiologist  This document has been electronically signed. Dec 19 2024  4:05PM    < end of copied text >    Imaging Personally Reviewed:  YES    Consultant(s) Notes Reviewed:   YES    Plan of care was discussed with patient and /or primary care giver; all questions and concerns were addressed and care was aligned with patient's wishes.

## 2024-12-23 NOTE — PROGRESS NOTE ADULT - PROBLEM SELECTOR PROBLEM 4
Ulcerative colitis
Mural thickening of sigmoid colon
Ulcerative colitis
Ulcerative colitis

## 2024-12-23 NOTE — PRE PROCEDURE NOTE - PRE PROCEDURE EVALUATION
Interventional Radiology Pre-Procedure Note    Diagnosis/Indication: Patient is a 54y old  Female with colon mass and peritoneal carcinomatosis. Biopsy of peritoneal mass was requested.     PAST MEDICAL & SURGICAL HISTORY:  Asthma  Ulcerative colitis  No significant past surgical history    Allergies: No Known Allergies    LABS:  CBC Full  -  ( 23 Dec 2024 08:00 )  WBC Count : 10.43 K/uL  RBC Count : 4.68 M/uL  Hemoglobin : 9.4 g/dL  Hematocrit : 31.7 %  Platelet Count - Automated : 311 K/uL  Mean Cell Volume : 67.7 fl  Mean Cell Hemoglobin : 20.1 pg  Mean Cell Hemoglobin Concentration : 29.7 g/dL    12-23    136  |  102  |  14  ----------------------------<  160[H]  3.9   |  27  |  0.60    Ca    9.0      23 Dec 2024 08:00  Phos  3.3     12-23  Mg     2.3     12-23    TPro  7.4  /  Alb  3.0[L]  /  TBili  0.3  /  DBili  x   /  AST  14  /  ALT  29  /  AlkPhos  58  12-23    PT/INR - ( 23 Dec 2024 08:00 )   PT: 10.6 sec;   INR: 0.91 ratio       PTT - ( 23 Dec 2024 08:00 )  PTT:26.2 sec    Procedure/ risks/ benefits/ alternatives were discussed with the patient, including but not limited to risks of infection, bleeding and possible injury to bowel/nearby structures, which may require blood transfusion and/or additional unplanned procedures. The patient verbalizes understanding, and witnessed informed consent was obtained.

## 2024-12-23 NOTE — PROGRESS NOTE ADULT - SUBJECTIVE AND OBJECTIVE BOX
HPI:  54 F, from home, PMHx asthma and UC [diagnosed in 2006] presented to the ED for abdominal pain x 2 weeks, headache, nausea, and vomiting for 1 week and dizziness for 1 day. Endorses non-radiating, intermittent sharp lower abdominal pain. Also complained on a generalized headache, and nausea and nonbloody, nonbilious vomiting with decreased PO intake. Patient noted to have bloody stool for past 2 weeks and diarrhea that started yesterday. Patient states she feels very weak. Most recent colonoscopy was in 2022, which was unremarkable. Patient saw PCP Dr. Jenae Wiggins recently who recommended a repeat colonoscopy which was scheduled for next month. Denies fever, chills, CP. SOB, palpitations, constipation or urinary Sx.  (17 Dec 2024 18:48)     Pt is seen and examined  pt is awake and lying in bed/out of bed to chair  pt seems comfortable and denies any complaints at this time    ROS:  Negative except for:    MEDICATIONS  (STANDING):  albuterol/ipratropium for Nebulization 3 milliLiter(s) Nebulizer every 6 hours  iron sucrose IVPB 200 milliGRAM(s) IV Intermittent every 24 hours  montelukast 10 milliGRAM(s) Oral daily  pantoprazole    Tablet 40 milliGRAM(s) Oral before breakfast  polyethylene glycol/electrolyte Solution. 4000 milliLiter(s) Oral once    MEDICATIONS  (PRN):  acetaminophen     Tablet .. 650 milliGRAM(s) Oral every 6 hours PRN Temp greater or equal to 38C (100.4F), Mild Pain (1 - 3)  guaiFENesin Oral Liquid (Sugar-Free) 100 milliGRAM(s) Oral every 6 hours PRN Cough  LORazepam   Injectable 0.5 milliGRAM(s) IV Push once PRN Anxiety  ondansetron Injectable 4 milliGRAM(s) IV Push every 8 hours PRN Nausea and/or Vomiting      Allergies    No Known Allergies    Intolerances        Vital Signs Last 24 Hrs  T(C): 36.6 (23 Dec 2024 05:05), Max: 37 (22 Dec 2024 13:39)  T(F): 97.8 (23 Dec 2024 05:05), Max: 98.6 (22 Dec 2024 13:39)  HR: 66 (23 Dec 2024 05:05) (66 - 78)  BP: 143/71 (23 Dec 2024 05:05) (116/73 - 143/71)  BP(mean): 96 (23 Dec 2024 05:05) (96 - 96)  RR: 18 (23 Dec 2024 05:05) (18 - 18)  SpO2: 95% (23 Dec 2024 05:05) (95% - 95%)    Parameters below as of 23 Dec 2024 05:05  Patient On (Oxygen Delivery Method): room air        PHYSICAL EXAM  General: adult in NAD  HEENT: clear oropharynx, anicteric sclera, pink conjunctiva  Neck: supple  CV: normal S1/S2 with no murmur rubs or gallops  Lungs: positive air movement b/l ant lungs,clear to auscultation, no wheezes, no rales  Abdomen: soft non-tender non-distended, no hepatosplenomegaly  Ext: no clubbing cyanosis or edema  Skin: no rashes and no petechiae  Neuro: alert and oriented X 4, no focal deficits  LABS:                          9.4    10.43 )-----------( 311      ( 23 Dec 2024 08:00 )             31.7         Mean Cell Volume : 67.7 fl  Mean Cell Hemoglobin : 20.1 pg  Mean Cell Hemoglobin Concentration : 29.7 g/dL  Auto Neutrophil # : x  Auto Lymphocyte # : x  Auto Monocyte # : x  Auto Eosinophil # : x  Auto Basophil # : x  Auto Neutrophil % : x  Auto Lymphocyte % : x  Auto Monocyte % : x  Auto Eosinophil % : x  Auto Basophil % : x    Serial CBC  Hematocrit 31.7  Hemoglobin 9.4  Plat 311  RBC 4.68  WBC 10.43  Serial CBC  Hematocrit 29.6  Hemoglobin 8.9  Plat 295  RBC 4.35  WBC 11.80  Serial CBC  Hematocrit 29.4  Hemoglobin 8.8  Plat 311  RBC 4.34  WBC 11.88  Serial CBC  Hematocrit 29.9  Hemoglobin 9.0  Plat 357  RBC 4.47  WBC 8.21    12-23    136  |  102  |  14  ----------------------------<  160[H]  3.9   |  27  |  0.60    Ca    9.0      23 Dec 2024 08:00  Phos  3.3     12-23  Mg     2.3     12-23    TPro  7.4  /  Alb  3.0[L]  /  TBili  0.3  /  DBili  x   /  AST  14  /  ALT  29  /  AlkPhos  58  12-23      PT/INR - ( 23 Dec 2024 08:00 )   PT: 10.6 sec;   INR: 0.91 ratio         PTT - ( 23 Dec 2024 08:00 )  PTT:26.2 sec    Iron - Total Binding Capacity.: 344 ug/dL (12-18 @ 05:18)  Ferritin: 8 ng/mL (12-18 @ 05:18)            BLOOD SMEAR INTERPRETATION:       RADIOLOGY & ADDITIONAL STUDIES:

## 2024-12-23 NOTE — PROGRESS NOTE ADULT - SUBJECTIVE AND OBJECTIVE BOX
[   ] ICU                                          [   ] CCU                                      [ X  ] Medical Floor    Patient is a 54 year old female with abdominal pain. GI consulted to evaluate.          54 year old female from home, with past medical history significant for asthma and Ulcerative Colitis presented to the emergency room with 2 weeks history of progressively worsening intermittent 8/10 intensity non radiating sharp lower abdominal pain associated with bloody watery diarrhea associated with nausea, non bloody vomiting, headache and loss of appetite.      Patient appears comfortable. No new complaints reported, No nausea, vomiting, hematemesis, hematochezia, melena, fever, chills, chest pain, SOB, cough or diarrhea reported.       Prior Colonoscopy:  2022      PAST MEDICAL HISTORY    Asthma    Ulcerative colitis        PAST SURGICAL HISTORY    No significant past surgical history reported        Allergies    No Known Allergies    Intolerances  None         SOCIAL HISTORY  Advanced Directives:       [ X ] Full Code       [  ] DNR  Marital Status:         [  ] M      [  ] S      [  ] D       [  ] W  Children:       [ X ] Yes      [  ] No  Occupation:        [  ] Employed       [X  ] Unemployed       [  ] Retired  Diet:       [ X ] Regular       [  ] PEG feeding          [  ] NG tube feeding  Drug Use:           [X  ] No          [  ] Yes  Alcohol:           [X  ] No             [  ] Yes (socially)         [  ] Yes (chronic)  Tobacco:           [  ] Yes           [ X ] No      FAMILY HISTORY  [ X ] Heart Disease            [ X ] Diabetes             [ X ] HTN             [  ] Colon Cancer             [  ] Stomach Cancer              [  ] Pancreatic Cancer        VITALS   Vital Signs Last 24 Hrs  T(C): 36.6 (12-23-24 @ 05:05), Max: 37 (12-22-24 @ 13:39)  T(F): 97.8 (12-23-24 @ 05:05), Max: 98.6 (12-22-24 @ 13:39)  HR: 66 (12-23-24 @ 05:05) (66 - 78)  BP: 143/71 (12-23-24 @ 05:05) (116/73 - 143/71)  BP(mean): 96 (12-23-24 @ 05:05) (96 - 96)  RR: 18 (12-23-24 @ 05:05) (18 - 18)  SpO2: 95% (12-23-24 @ 05:05) (95% - 95%)      MEDICATIONS  (STANDING):  albuterol/ipratropium for Nebulization 3 milliLiter(s) Nebulizer every 6 hours  iron sucrose IVPB 200 milliGRAM(s) IV Intermittent every 24 hours  montelukast 10 milliGRAM(s) Oral daily  pantoprazole    Tablet 40 milliGRAM(s) Oral before breakfast  polyethylene glycol/electrolyte Solution. 4000 milliLiter(s) Oral once    MEDICATIONS  (PRN):  acetaminophen     Tablet .. 650 milliGRAM(s) Oral every 6 hours PRN Temp greater or equal to 38C (100.4F), Mild Pain (1 - 3)  guaiFENesin Oral Liquid (Sugar-Free) 100 milliGRAM(s) Oral every 6 hours PRN Cough  ondansetron Injectable 4 milliGRAM(s) IV Push every 8 hours PRN Nausea and/or Vomiting                            9.4    10.43 )-----------( 311      ( 23 Dec 2024 08:00 )             31.7       12-23    136  |  102  |  14  ----------------------------<  160[H]  3.9   |  27  |  0.60    Ca    9.0      23 Dec 2024 08:00  Phos  3.3     12-23  Mg     2.3     12-23    TPro  7.4  /  Alb  3.0[L]  /  TBili  0.3  /  DBili  x   /  AST  14  /  ALT  29  /  AlkPhos  58  12-23      PT/INR - ( 23 Dec 2024 08:00 )   PT: 10.6 sec;   INR: 0.91 ratio         PTT - ( 23 Dec 2024 08:00 )  PTT:26.2 sec

## 2024-12-23 NOTE — PROGRESS NOTE ADULT - PROBLEM SELECTOR PLAN 6
- P/w   - Likely reactive iso malignancy  - Continue to monitor
- P/w   - Likely reactive iso malignancy  - Continue to monitor
supplement potassium  monitor BMP.
- P/w   - Likely reactive iso malignancy  - Continue to monitor
- P/w   - Likely reactive iso malignancy  - Continue to monitor

## 2024-12-23 NOTE — PROGRESS NOTE ADULT - SUBJECTIVE AND OBJECTIVE BOX
Patient is a 54y old  Female who presents with a chief complaint of abdominal pain (21 Dec 2024 14:55)    pt seen in icu [  ], reg med floor [ x  ], bed [ x ], chair at bedside [   ], a+o x3 [ x ], lethargic [  ],    nad [ x ]        Allergies    No Known Allergies        Vitals    T(F): 97.8 (12-23-24 @ 05:05), Max: 98.6 (12-22-24 @ 13:39)  HR: 66 (12-23-24 @ 05:05) (66 - 78)  BP: 143/71 (12-23-24 @ 05:05) (116/73 - 143/71)  RR: 18 (12-23-24 @ 05:05) (18 - 18)  SpO2: 95% (12-23-24 @ 05:05) (95% - 95%)  Wt(kg): --  CAPILLARY BLOOD GLUCOSE          Labs                          8.9    11.80 )-----------( 295      ( 22 Dec 2024 05:40 )             29.6       12-22    135  |  101  |  12  ----------------------------<  158[H]  3.9   |  27  |  0.56    Ca    9.1      22 Dec 2024 05:40    TPro  7.4  /  Alb  3.1[L]  /  TBili  0.3  /  DBili  x   /  AST  14  /  ALT  24  /  AlkPhos  60  12-22            .Blood BLOOD  12-19 @ 05:25   No growth at 72 Hours  --  --      .Blood BLOOD  12-19 @ 05:20   No growth at 72 Hours  --  --          Radiology Results      Meds    MEDICATIONS  (STANDING):  albuterol/ipratropium for Nebulization 3 milliLiter(s) Nebulizer every 6 hours  iron sucrose IVPB 200 milliGRAM(s) IV Intermittent every 24 hours  montelukast 10 milliGRAM(s) Oral daily  pantoprazole    Tablet 40 milliGRAM(s) Oral before breakfast      MEDICATIONS  (PRN):  acetaminophen     Tablet .. 650 milliGRAM(s) Oral every 6 hours PRN Temp greater or equal to 38C (100.4F), Mild Pain (1 - 3)  guaiFENesin Oral Liquid (Sugar-Free) 100 milliGRAM(s) Oral every 6 hours PRN Cough  ondansetron Injectable 4 milliGRAM(s) IV Push every 8 hours PRN Nausea and/or Vomiting      Physical Exam    Neuro :  no focal deficits  Respiratory: CTA B/L  CV: RRR, S1S2, no murmurs,   Abdominal: Soft, NT, ND +BS,  Extremities: No edema, + peripheral pulses      ASSESSMENT    symptomatic anemia,   omental carcinomatosis likely metastatic disease,   recto sigmoid mass likely malignancy   adnexal mass r/o ovarian or tubular malig,   hypokalemia,   influenza b,   h/o  asthma   UC [diagnosed in 2006]         PLAN    s/p transfuse 2 units prbc,   hgb improved   ca 125, ca19-9, cea, elevated noted    heme onc f/u   iron studies with low iron noted     ·daily cbc  ·transfusion for hb<7.  pt with Omental carcinomatosis  ·This is highly suspicious for Gynecologic malignancy ie ovarian cancer with large right adnexal mass  · 165, CEA 14,  527 noted.    ·CT chest with Scattered linear atelectasis/scarring. Lungs otherwise clear noted above.     ·Patient also found to have sigmoid colon thickening concerning for possible colon cancer especially with her history of UC.  ·ir biopsy of the omental nodule in am    keep patient NPO after midnight on Sunday.   On Monday send early AM labs including CBC, BMP, and PT, INR / PTT.    Hold all anticoagulation, NSAIDS, and antiplatelet medication.    Please place an IR Procedure order.  gi f/u   pt with Ulcerative colitis exacerbation   d/c Solumedrol 60mg q6h in am  rectosigmoid mass  f/u Colonoscopy after 5 days of isolation  protonix   surg f/u   - No acute intervention   - F/U GI for colonoscopy   gyn onc f/u   cystic right adnexal lesion, possibly ovarian or tubular malignancy.   Possible GYN malignancy at R adnexa   - Will continue to follow pending results of colonoscopy and biopsy   mri pelv with Transmural rectosigmoid tumor involves a length of approximately 12 cm.   Recommend colonoscopy with tissue sampling to confirm diagnosis  Loculated fluid within the pelvic cul-de-sac also encircling the right   ovary corresponds with the CT observation. Omental carcinomatosis and peritoneal   metastatic disease. Small volume pelvic ascites noted      supplemented potassium,   cont tamiflu x 5/5 days,   pulm f/u    cont bronchodilators   cont singulair   cont Breo Ellipta  cont current meds       Patient is a 54y old  Female who presents with a chief complaint of abdominal pain (21 Dec 2024 14:55)    pt seen in icu [  ], reg med floor [ x  ], bed [ x ], chair at bedside [   ], a+o x3 [ x ], lethargic [  ],    nad [ x ]        Allergies    No Known Allergies        Vitals    T(F): 97.8 (12-23-24 @ 05:05), Max: 98.6 (12-22-24 @ 13:39)  HR: 66 (12-23-24 @ 05:05) (66 - 78)  BP: 143/71 (12-23-24 @ 05:05) (116/73 - 143/71)  RR: 18 (12-23-24 @ 05:05) (18 - 18)  SpO2: 95% (12-23-24 @ 05:05) (95% - 95%)  Wt(kg): --  CAPILLARY BLOOD GLUCOSE          Labs                          8.9    11.80 )-----------( 295      ( 22 Dec 2024 05:40 )             29.6       12-22    135  |  101  |  12  ----------------------------<  158[H]  3.9   |  27  |  0.56    Ca    9.1      22 Dec 2024 05:40    TPro  7.4  /  Alb  3.1[L]  /  TBili  0.3  /  DBili  x   /  AST  14  /  ALT  24  /  AlkPhos  60  12-22            .Blood BLOOD  12-19 @ 05:25   No growth at 72 Hours  --  --      .Blood BLOOD  12-19 @ 05:20   No growth at 72 Hours  --  --          Radiology Results      Meds    MEDICATIONS  (STANDING):  albuterol/ipratropium for Nebulization 3 milliLiter(s) Nebulizer every 6 hours  iron sucrose IVPB 200 milliGRAM(s) IV Intermittent every 24 hours  montelukast 10 milliGRAM(s) Oral daily  pantoprazole    Tablet 40 milliGRAM(s) Oral before breakfast      MEDICATIONS  (PRN):  acetaminophen     Tablet .. 650 milliGRAM(s) Oral every 6 hours PRN Temp greater or equal to 38C (100.4F), Mild Pain (1 - 3)  guaiFENesin Oral Liquid (Sugar-Free) 100 milliGRAM(s) Oral every 6 hours PRN Cough  ondansetron Injectable 4 milliGRAM(s) IV Push every 8 hours PRN Nausea and/or Vomiting      Physical Exam    Neuro :  no focal deficits  Respiratory: CTA B/L  CV: RRR, S1S2, no murmurs,   Abdominal: Soft, NT, ND +BS,  Extremities: No edema, + peripheral pulses      ASSESSMENT    symptomatic anemia,   omental carcinomatosis likely metastatic disease,   recto sigmoid mass likely malignancy   adnexal mass r/o ovarian or tubular malig,   hypokalemia,   influenza b,   h/o  asthma   UC [diagnosed in 2006]         PLAN    s/p transfuse 2 units prbc,   hgb improved   ca 125, ca19-9, cea, elevated noted    heme onc f/u   iron studies with low iron noted     pt to complete venofer day 3 today  ·daily cbc  ·transfusion for hb<7.  pt with Omental carcinomatosis  ·This is highly suspicious for Gynecologic malignancy ie ovarian cancer with large right adnexal mass  · 165, CEA 14,  527 noted.    ·CT chest with Scattered linear atelectasis/scarring. Lungs otherwise clear noted above.     ·Patient also found to have sigmoid colon thickening concerning for possible colon cancer especially with her history of UC.  ·ir biopsy of the omental nodule today    keep patient NPO after midnight on Sunday.   On Monday send early AM labs including CBC, BMP, and PT, INR / PTT.    Hold all anticoagulation, NSAIDS, and antiplatelet medication.    Please place an IR Procedure order.  gi f/u   pt with Ulcerative colitis exacerbation   d/c'd Solumedrol    rectosigmoid mass  f/u Colonoscopy in am   golytely ordered   protonix   surg f/u   - No acute intervention   - F/U GI for colonoscopy   gyn onc f/u   cystic right adnexal lesion, possibly ovarian or tubular malignancy.   Possible GYN malignancy at R adnexa   - Will continue to follow pending results of colonoscopy and biopsy   mri pelv with Transmural rectosigmoid tumor involves a length of approximately 12 cm.   Recommend colonoscopy with tissue sampling to confirm diagnosis  Loculated fluid within the pelvic cul-de-sac also encircling the right   ovary corresponds with the CT observation. Omental carcinomatosis and peritoneal   metastatic disease. Small volume pelvic ascites noted      supplemented potassium,   completed tamiflu x 5 days,   pulm f/u    cont bronchodilators   cont singulair   cont Breo Ellipta  cont current meds

## 2024-12-23 NOTE — PROGRESS NOTE ADULT - ASSESSMENT
54 year old, F, from home, w/ PMH Asthma and UC [diagnosed in 2006].  Presented to the ED for abdominal pain x 2 weeks, headache, nausea, and vomiting x 1 week and dizziness x 1 day. Endorses non-radiating, intermittent sharp lower abdominal pain. Also complained on a generalized headache, and nausea and nonbloody, nonbilious vomiting with decreased PO intake. Patient noted to have bloody stool for past  2 weeks and diarrhea that started yesterday. Patient states she feels very weak. Most recent colonoscopy was in 2022, which was unremarkable. Patient saw PCP Dr. Jenae Wiggins recently who recommended a repeat colonoscopy which was scheduled for next month.  Admitted for Symptomatic anemia and malignancy [place].    CT A/P shows Ascites and omental carcinomatosis consistent with metastatic disease.                               7.0 x 2.6 x 3.2 cm cystic right adnexal lesion, possibly ovarian or tubular malignancy.                               Irregular thickening of the sigmoid colon also concerning for malignancy.   12/18 febrile 100.5- resolved  12/19 MRCP  shows Transmural rectosigmoid tumor 12 cm. Loculated fluid within the pelvic cul-de-sac also encircling the right ovary. Omental carcinomatosis and peritoneal metastatic disease. Small volume pelvic ascites.     Surg following and rec's No surg intervention   Heme/onc following s/p 2U PRBC's rec's transfusion and maintain hb<7.   Pulm following Hx asthma + Influenza B. rec's to cont Tamiflu po BID for 5 days total.  GYN following recs post MR results    12/23 IR w/ CT guided omental biopsy on Monday,    12/24 GI following scheduled for colonoscopy tomorrow maintain NPO midnight.

## 2024-12-23 NOTE — PROGRESS NOTE ADULT - PROBLEM SELECTOR PLAN 3
H/o UC, dx 2006, Reported [facility] 2022, unremarkable  - P/w abdominal pain & bloody stool x 2 weeks  - CTAP showed irregular thickening of sigmoid colon also concerning for malignancy  - CEA, CA 19-9, & 125 pending-f/u results   - GI-Dr. Mcwilliams

## 2024-12-23 NOTE — PROGRESS NOTE ADULT - PROBLEM SELECTOR PLAN 7
- (+) RVP-Inf B  - Currently in RA  - C/w Tamiflu x 5 days  - Pulm-Dr. Davison

## 2024-12-23 NOTE — PROGRESS NOTE ADULT - PROBLEM SELECTOR PROBLEM 6
Thrombocytosis
Hypokalemia
Mural thickening of sigmoid colon
Thrombocytosis

## 2024-12-23 NOTE — PROGRESS NOTE ADULT - PROBLEM SELECTOR PLAN 1
- P/w HA & dizziness  - Symptomatic Hgb 5.3. S/p 2U PRBC's hgb 9.1  - Per Attending monitor CBC daily  - S/p TIBC wnl  - Ferritin 8  GI Dr Lugo following -colonoscopy scheduled for 12/24

## 2024-12-23 NOTE — PROGRESS NOTE ADULT - PROBLEM SELECTOR PLAN 10
Pt from home   --IR consulted f/u results   --12/24 Colonoscopy npo midnight   --GYN following recs post MR results   --f/u Bcx

## 2024-12-24 VITALS
RESPIRATION RATE: 18 BRPM | OXYGEN SATURATION: 94 % | DIASTOLIC BLOOD PRESSURE: 59 MMHG | TEMPERATURE: 98 F | SYSTOLIC BLOOD PRESSURE: 116 MMHG | HEART RATE: 66 BPM

## 2024-12-24 DIAGNOSIS — C80.0 DISSEMINATED MALIGNANT NEOPLASM, UNSPECIFIED: ICD-10-CM

## 2024-12-24 LAB
ALBUMIN SERPL ELPH-MCNC: 2.7 G/DL — LOW (ref 3.5–5)
ALP SERPL-CCNC: 52 U/L — SIGNIFICANT CHANGE UP (ref 40–120)
ALT FLD-CCNC: 37 U/L DA — SIGNIFICANT CHANGE UP (ref 10–60)
ANION GAP SERPL CALC-SCNC: 7 MMOL/L — SIGNIFICANT CHANGE UP (ref 5–17)
AST SERPL-CCNC: 20 U/L — SIGNIFICANT CHANGE UP (ref 10–40)
BILIRUB SERPL-MCNC: 0.3 MG/DL — SIGNIFICANT CHANGE UP (ref 0.2–1.2)
BUN SERPL-MCNC: 19 MG/DL — HIGH (ref 7–18)
CALCIUM SERPL-MCNC: 8.7 MG/DL — SIGNIFICANT CHANGE UP (ref 8.4–10.5)
CHLORIDE SERPL-SCNC: 106 MMOL/L — SIGNIFICANT CHANGE UP (ref 96–108)
CO2 SERPL-SCNC: 29 MMOL/L — SIGNIFICANT CHANGE UP (ref 22–31)
CREAT SERPL-MCNC: 0.54 MG/DL — SIGNIFICANT CHANGE UP (ref 0.5–1.3)
CULTURE RESULTS: SIGNIFICANT CHANGE UP
CULTURE RESULTS: SIGNIFICANT CHANGE UP
EGFR: 109 ML/MIN/1.73M2 — SIGNIFICANT CHANGE UP
GLUCOSE SERPL-MCNC: 86 MG/DL — SIGNIFICANT CHANGE UP (ref 70–99)
HCT VFR BLD CALC: 29.1 % — LOW (ref 34.5–45)
HGB BLD-MCNC: 8.7 G/DL — LOW (ref 11.5–15.5)
MCHC RBC-ENTMCNC: 20.4 PG — LOW (ref 27–34)
MCHC RBC-ENTMCNC: 29.9 G/DL — LOW (ref 32–36)
MCV RBC AUTO: 68.3 FL — LOW (ref 80–100)
NRBC # BLD: 0 /100 WBCS — SIGNIFICANT CHANGE UP (ref 0–0)
PLATELET # BLD AUTO: 284 K/UL — SIGNIFICANT CHANGE UP (ref 150–400)
POTASSIUM SERPL-MCNC: 3.2 MMOL/L — LOW (ref 3.5–5.3)
POTASSIUM SERPL-SCNC: 3.2 MMOL/L — LOW (ref 3.5–5.3)
PROT SERPL-MCNC: 6.2 G/DL — SIGNIFICANT CHANGE UP (ref 6–8.3)
RBC # BLD: 4.26 M/UL — SIGNIFICANT CHANGE UP (ref 3.8–5.2)
RBC # FLD: 27.8 % — HIGH (ref 10.3–14.5)
SODIUM SERPL-SCNC: 142 MMOL/L — SIGNIFICANT CHANGE UP (ref 135–145)
SPECIMEN SOURCE: SIGNIFICANT CHANGE UP
SPECIMEN SOURCE: SIGNIFICANT CHANGE UP
WBC # BLD: 11.27 K/UL — HIGH (ref 3.8–10.5)
WBC # FLD AUTO: 11.27 K/UL — HIGH (ref 3.8–10.5)

## 2024-12-24 PROCEDURE — 71250 CT THORAX DX C-: CPT | Mod: MC

## 2024-12-24 PROCEDURE — 87637 SARSCOV2&INF A&B&RSV AMP PRB: CPT

## 2024-12-24 PROCEDURE — 85025 COMPLETE CBC W/AUTO DIFF WBC: CPT

## 2024-12-24 PROCEDURE — 86923 COMPATIBILITY TEST ELECTRIC: CPT

## 2024-12-24 PROCEDURE — 99231 SBSQ HOSP IP/OBS SF/LOW 25: CPT

## 2024-12-24 PROCEDURE — 88172 CYTP DX EVAL FNA 1ST EA SITE: CPT

## 2024-12-24 PROCEDURE — 82728 ASSAY OF FERRITIN: CPT

## 2024-12-24 PROCEDURE — 86900 BLOOD TYPING SEROLOGIC ABO: CPT

## 2024-12-24 PROCEDURE — 86850 RBC ANTIBODY SCREEN: CPT

## 2024-12-24 PROCEDURE — 36430 TRANSFUSION BLD/BLD COMPNT: CPT

## 2024-12-24 PROCEDURE — 20206 BIOPSY MUSCLE PERQ NEEDLE: CPT

## 2024-12-24 PROCEDURE — 94640 AIRWAY INHALATION TREATMENT: CPT

## 2024-12-24 PROCEDURE — 36415 COLL VENOUS BLD VENIPUNCTURE: CPT

## 2024-12-24 PROCEDURE — 77012 CT SCAN FOR NEEDLE BIOPSY: CPT

## 2024-12-24 PROCEDURE — 82378 CARCINOEMBRYONIC ANTIGEN: CPT

## 2024-12-24 PROCEDURE — 87040 BLOOD CULTURE FOR BACTERIA: CPT

## 2024-12-24 PROCEDURE — 85027 COMPLETE CBC AUTOMATED: CPT

## 2024-12-24 PROCEDURE — P9040: CPT

## 2024-12-24 PROCEDURE — 80053 COMPREHEN METABOLIC PANEL: CPT

## 2024-12-24 PROCEDURE — 86901 BLOOD TYPING SEROLOGIC RH(D): CPT

## 2024-12-24 PROCEDURE — 83550 IRON BINDING TEST: CPT

## 2024-12-24 PROCEDURE — 45380 COLONOSCOPY AND BIOPSY: CPT | Mod: 53

## 2024-12-24 PROCEDURE — 74177 CT ABD & PELVIS W/CONTRAST: CPT | Mod: MC

## 2024-12-24 PROCEDURE — 96375 TX/PRO/DX INJ NEW DRUG ADDON: CPT

## 2024-12-24 PROCEDURE — 83690 ASSAY OF LIPASE: CPT

## 2024-12-24 PROCEDURE — 72197 MRI PELVIS W/O & W/DYE: CPT | Mod: MC

## 2024-12-24 PROCEDURE — 83540 ASSAY OF IRON: CPT

## 2024-12-24 PROCEDURE — 88305 TISSUE EXAM BY PATHOLOGIST: CPT

## 2024-12-24 PROCEDURE — 86301 IMMUNOASSAY TUMOR CA 19-9: CPT

## 2024-12-24 PROCEDURE — 99285 EMERGENCY DEPT VISIT HI MDM: CPT

## 2024-12-24 PROCEDURE — 88305 TISSUE EXAM BY PATHOLOGIST: CPT | Mod: 26

## 2024-12-24 PROCEDURE — 93005 ELECTROCARDIOGRAM TRACING: CPT

## 2024-12-24 PROCEDURE — A9585: CPT

## 2024-12-24 PROCEDURE — 83735 ASSAY OF MAGNESIUM: CPT

## 2024-12-24 PROCEDURE — 84100 ASSAY OF PHOSPHORUS: CPT

## 2024-12-24 PROCEDURE — 85610 PROTHROMBIN TIME: CPT

## 2024-12-24 PROCEDURE — 86304 IMMUNOASSAY TUMOR CA 125: CPT

## 2024-12-24 PROCEDURE — 96374 THER/PROPH/DIAG INJ IV PUSH: CPT

## 2024-12-24 PROCEDURE — 81003 URINALYSIS AUTO W/O SCOPE: CPT

## 2024-12-24 PROCEDURE — 84702 CHORIONIC GONADOTROPIN TEST: CPT

## 2024-12-24 PROCEDURE — 85730 THROMBOPLASTIN TIME PARTIAL: CPT

## 2024-12-24 RX ORDER — POTASSIUM CHLORIDE 600 MG/1
10 TABLET, FILM COATED, EXTENDED RELEASE ORAL
Refills: 0 | Status: DISCONTINUED | OUTPATIENT
Start: 2024-12-24 | End: 2024-12-24

## 2024-12-24 RX ORDER — POTASSIUM CHLORIDE 600 MG/1
40 TABLET, FILM COATED, EXTENDED RELEASE ORAL EVERY 4 HOURS
Refills: 0 | Status: DISCONTINUED | OUTPATIENT
Start: 2024-12-24 | End: 2024-12-24

## 2024-12-24 RX ADMIN — PANTOPRAZOLE 40 MILLIGRAM(S): 40 TABLET, DELAYED RELEASE ORAL at 05:04

## 2024-12-24 RX ADMIN — IPRATROPIUM BROMIDE AND ALBUTEROL SULFATE 3 MILLILITER(S): .5; 2.5 SOLUTION RESPIRATORY (INHALATION) at 14:07

## 2024-12-24 RX ADMIN — MONTELUKAST SODIUM 10 MILLIGRAM(S): 10 TABLET, FILM COATED ORAL at 11:32

## 2024-12-24 RX ADMIN — POTASSIUM CHLORIDE 40 MILLIEQUIVALENT(S): 600 TABLET, FILM COATED, EXTENDED RELEASE ORAL at 14:11

## 2024-12-24 NOTE — CHART NOTE - NSCHARTNOTEFT_GEN_A_CORE
Assessment: Nutrition f/u for malnutrition    Visited pt in room today. Pt is NPO at time of visit d/t s/p colonoscopy however reports good PO intake of meals in-house and good acceptance of oral supplement order prior to NPO. Diet order is currently upgraded to PO. Food prefs obtained and communicated to kitchen. NKFA. No chewing/ swallowing issues reported. Denies any GI distress. Pt is pending discharge s/p colonoscopy per IDT meeting.      Factors impacting intake: [ ] none [ ] nausea  [ ] vomiting [ ] diarrhea [ ] constipation  [ ]chewing problems [ ] swallowing issues  [ x] other: NPO    Diet Prescription: Diet, Regular:   Lacto Veg (Accepts Milk & Milk Products)  Supplement Feeding Modality:  Oral  Ensure Plus High Protein Cans or Servings Per Day:  1       Frequency:  Daily (12-24-24 @ 10:38)    Daily   % Weight Change- n/a    Pertinent Medications: MEDICATIONS  (STANDING):  albuterol/ipratropium for Nebulization 3 milliLiter(s) Nebulizer every 6 hours  montelukast 10 milliGRAM(s) Oral daily  pantoprazole    Tablet 40 milliGRAM(s) Oral before breakfast  polyethylene glycol/electrolyte Solution. 4000 milliLiter(s) Oral once  potassium chloride    Tablet ER 40 milliEquivalent(s) Oral every 4 hours    MEDICATIONS  (PRN):  acetaminophen     Tablet .. 650 milliGRAM(s) Oral every 6 hours PRN Temp greater or equal to 38C (100.4F), Mild Pain (1 - 3)  guaiFENesin Oral Liquid (Sugar-Free) 100 milliGRAM(s) Oral every 6 hours PRN Cough  ondansetron Injectable 4 milliGRAM(s) IV Push every 8 hours PRN Nausea and/or Vomiting    Pertinent Labs: 12-24 Na142 mmol/L Glu 86 mg/dL K+ 3.2 mmol/L[L] Cr  0.54 mg/dL BUN 19 mg/dL[H] 12-23 Phos 3.3 mg/dL 12-24 Alb 2.7 g/dL[L]     CAPILLARY BLOOD GLUCOSE      Skin: intact pressure-wise    Estimated Needs:   [ x] no change since previous assessment  [ ] recalculated:     Previous Nutrition Diagnosis:   [ ] Inadequate Energy Intake [ ]Inadequate Oral Intake [ ] Excessive Energy Intake   [ ] Underweight [ ] Increased Nutrient Needs [ ] Overweight/Obesity  [ ] Swallowing Difficult   [ ] Altered GI Function [ ] Unintended Weight Loss [ ] Food & Nutrition Related Knowledge Deficit [x ] Malnutrition (Severe)   [ ] Not Ready for Diet/Life Style Changes     Nutrition Diagnosis is [ ] ongoing  [x ] Improving   [ ] resolved [ ] not applicable     New Nutrition Diagnosis: [x ] not applicable       Interventions:   Recommend to continue current diet order.    Monitoring and Evaluation:   [x ] PO intake [ x ] Tolerance to diet prescription [ x ] weights [ x ] labs[ x ] follow up per protocol  [ ] other:

## 2024-12-24 NOTE — PROGRESS NOTE ADULT - ASSESSMENT
54 year old female with history of UC, admitted for abdominal pain and influenza B, found to have multiple omental carcinomatosis, right adnexal mass and sigmoid colon thickening

## 2024-12-24 NOTE — PROGRESS NOTE ADULT - SKIN
warm and dry/no rashes
warm and dry/no rashes/no ulcers

## 2024-12-24 NOTE — PROGRESS NOTE ADULT - SUBJECTIVE AND OBJECTIVE BOX
Patient is a 54y old  Female who presents with a chief complaint of Symptomatic anemia and malignancy workup (23 Dec 2024 10:52)    pt seen in icu [  ], reg med floor [ x  ], bed [ x ], chair at bedside [   ], a+o x3 [ x ], lethargic [  ],    nad [ x ]    Allergies    No Known Allergies        Vitals    T(F): 98.8 (12-24-24 @ 05:22), Max: 98.8 (12-24-24 @ 05:22)  HR: 70 (12-24-24 @ 05:22) (58 - 72)  BP: 121/88 (12-24-24 @ 05:22) (121/88 - 153/90)  RR: 17 (12-24-24 @ 05:22) (16 - 20)  SpO2: 95% (12-24-24 @ 05:22) (95% - 98%)  Wt(kg): --  CAPILLARY BLOOD GLUCOSE          Labs                          8.7    11.27 )-----------( 284      ( 24 Dec 2024 05:21 )             29.1       12-24    142  |  106  |  19[H]  ----------------------------<  86  3.2[L]   |  29  |  0.54    Ca    8.7      24 Dec 2024 05:21  Phos  3.3     12-23  Mg     2.3     12-23    TPro  6.2  /  Alb  2.7[L]  /  TBili  0.3  /  DBili  x   /  AST  20  /  ALT  37  /  AlkPhos  52  12-24            .Blood BLOOD  12-19 @ 05:25   No growth at 4 days  --  --      .Blood BLOOD  12-19 @ 05:20   No growth at 4 days  --  --          Radiology Results      Meds    MEDICATIONS  (STANDING):  albuterol/ipratropium for Nebulization 3 milliLiter(s) Nebulizer every 6 hours  iron sucrose IVPB 200 milliGRAM(s) IV Intermittent every 24 hours  montelukast 10 milliGRAM(s) Oral daily  pantoprazole    Tablet 40 milliGRAM(s) Oral before breakfast  polyethylene glycol/electrolyte Solution. 4000 milliLiter(s) Oral once      MEDICATIONS  (PRN):  acetaminophen     Tablet .. 650 milliGRAM(s) Oral every 6 hours PRN Temp greater or equal to 38C (100.4F), Mild Pain (1 - 3)  guaiFENesin Oral Liquid (Sugar-Free) 100 milliGRAM(s) Oral every 6 hours PRN Cough  ondansetron Injectable 4 milliGRAM(s) IV Push every 8 hours PRN Nausea and/or Vomiting      Physical Exam    Neuro :  no focal deficits  Respiratory: CTA B/L  CV: RRR, S1S2, no murmurs,   Abdominal: Soft, NT, ND +BS,  Extremities: No edema, + peripheral pulses      ASSESSMENT    symptomatic anemia,   omental carcinomatosis likely metastatic disease,   recto sigmoid mass likely malignancy   adnexal mass r/o ovarian or tubular malig,   hypokalemia,   influenza b,   h/o  asthma   UC [diagnosed in 2006]         PLAN    s/p transfuse 2 units prbc,   hgb improved   ca 125, ca19-9, cea, elevated noted    heme onc f/u   iron studies with low iron noted     pt to complete venofer day 3 today  ·daily cbc  ·transfusion for hb<7.  pt with Omental carcinomatosis  ·This is highly suspicious for Gynecologic malignancy ie ovarian cancer with large right adnexal mass  · 165, CEA 14,  527 noted.    ·CT chest with Scattered linear atelectasis/scarring. Lungs otherwise clear noted above.     ·Patient also found to have sigmoid colon thickening concerning for possible colon cancer especially with her history of UC.  ·ir biopsy of the omental nodule today    keep patient NPO after midnight on Sunday.   On Monday send early AM labs including CBC, BMP, and PT, INR / PTT.    Hold all anticoagulation, NSAIDS, and antiplatelet medication.    Please place an IR Procedure order.  gi f/u   pt with Ulcerative colitis exacerbation   d/c'd Solumedrol    rectosigmoid mass  f/u Colonoscopy in am   golytely ordered   protonix   surg f/u   - No acute intervention   - F/U GI for colonoscopy   gyn onc f/u   cystic right adnexal lesion, possibly ovarian or tubular malignancy.   Possible GYN malignancy at R adnexa   - Will continue to follow pending results of colonoscopy and biopsy   mri pelv with Transmural rectosigmoid tumor involves a length of approximately 12 cm.   Recommend colonoscopy with tissue sampling to confirm diagnosis  Loculated fluid within the pelvic cul-de-sac also encircling the right   ovary corresponds with the CT observation. Omental carcinomatosis and peritoneal   metastatic disease. Small volume pelvic ascites noted      supplemented potassium,   completed tamiflu x 5 days,   pulm f/u    cont bronchodilators   cont singulair   cont Breo Ellipta  cont current meds       Patient is a 54y old  Female who presents with a chief complaint of Symptomatic anemia and malignancy workup (23 Dec 2024 10:52)    pt seen in icu [  ], reg med floor [ x  ], bed [ x ], chair at bedside [   ], a+o x3 [ x ], lethargic [  ],    nad [ x ]    Allergies    No Known Allergies        Vitals    T(F): 98.8 (12-24-24 @ 05:22), Max: 98.8 (12-24-24 @ 05:22)  HR: 70 (12-24-24 @ 05:22) (58 - 72)  BP: 121/88 (12-24-24 @ 05:22) (121/88 - 153/90)  RR: 17 (12-24-24 @ 05:22) (16 - 20)  SpO2: 95% (12-24-24 @ 05:22) (95% - 98%)  Wt(kg): --  CAPILLARY BLOOD GLUCOSE          Labs                          8.7    11.27 )-----------( 284      ( 24 Dec 2024 05:21 )             29.1       12-24    142  |  106  |  19[H]  ----------------------------<  86  3.2[L]   |  29  |  0.54    Ca    8.7      24 Dec 2024 05:21  Phos  3.3     12-23  Mg     2.3     12-23    TPro  6.2  /  Alb  2.7[L]  /  TBili  0.3  /  DBili  x   /  AST  20  /  ALT  37  /  AlkPhos  52  12-24            .Blood BLOOD  12-19 @ 05:25   No growth at 4 days  --  --      .Blood BLOOD  12-19 @ 05:20   No growth at 4 days  --  --          Radiology Results      Meds    MEDICATIONS  (STANDING):  albuterol/ipratropium for Nebulization 3 milliLiter(s) Nebulizer every 6 hours  iron sucrose IVPB 200 milliGRAM(s) IV Intermittent every 24 hours  montelukast 10 milliGRAM(s) Oral daily  pantoprazole    Tablet 40 milliGRAM(s) Oral before breakfast  polyethylene glycol/electrolyte Solution. 4000 milliLiter(s) Oral once      MEDICATIONS  (PRN):  acetaminophen     Tablet .. 650 milliGRAM(s) Oral every 6 hours PRN Temp greater or equal to 38C (100.4F), Mild Pain (1 - 3)  guaiFENesin Oral Liquid (Sugar-Free) 100 milliGRAM(s) Oral every 6 hours PRN Cough  ondansetron Injectable 4 milliGRAM(s) IV Push every 8 hours PRN Nausea and/or Vomiting      Physical Exam    Neuro :  no focal deficits  Respiratory: CTA B/L  CV: RRR, S1S2, no murmurs,   Abdominal: Soft, NT, ND +BS,  Extremities: No edema, + peripheral pulses      ASSESSMENT    symptomatic anemia,   omental carcinomatosis likely metastatic disease,   recto sigmoid mass likely malignancy   adnexal mass r/o ovarian or tubular malig,   hypokalemia,   influenza b,   h/o  asthma   UC [diagnosed in 2006]         PLAN    s/p transfuse 2 units prbc,   hgb improved   ca 125, ca19-9, cea, elevated noted    heme onc f/u   iron studies with low iron noted     completed venofer x 3 days  ·possibly associated with GI bleed from the UC and sigmoid colon thickening  · pending colonoscopy today  ·transfusion for hb<7  ·ferritin 8, pls give pt venofer 200mg daily until d/c, can cont in office  Pt wants to go home today.   If she does, still with rectal bleeding but hgb stable, may be able to complete further testing as outpt.   Would still give her one dose of venofer before d/c if she goes home today.   F/u gyn/onc re loculated fluid around the ovary to ensure this is not infectious before dc.   Will try to expedite and facilitate testing in office as outpt if she goes home,   pls give heme-onc a call as pt needs an appt early next week with us.    pt with Omental carcinomatosis  MRI showed a large rectosigmoid colon mass  probably colon ca  there are fluids around pelvis and ovary  · 165, CEA 14,  527 noted.    ·CT chest with Scattered linear atelectasis/scarring. Lungs otherwise clear noted       ir f/u    - Peritoneal carcinomatosis again identified and biopsied 12/23/24.  - Core x6 obtained and deemed adequate by the patholo/gist in attendance.  f/u patho   gi f/u   pt with Ulcerative colitis exacerbation   d/c'd Solumedrol    rectosigmoid mass  f/u Colonoscopy today   golytely ordered   protonix   surg f/u   - No acute intervention   - F/U GI for colonoscopy   gyn onc f/u   cystic right adnexal lesion, possibly ovarian or tubular malignancy.   Possible GYN malignancy at R adnexa   - Will continue to follow pending results of colonoscopy and biopsy   mri pelv with Transmural rectosigmoid tumor involves a length of approximately 12 cm.   Recommend colonoscopy with tissue sampling to confirm diagnosis  Loculated fluid within the pelvic cul-de-sac also encircling the right   ovary corresponds with the CT observation. Omental carcinomatosis and peritoneal   metastatic disease. Small volume pelvic ascites noted      supplemented potassium,   completed tamiflu x 5 days,   pulm f/u    cont bronchodilators   cont singulair   cont Breo Ellipta  cont current meds   d/c plan if stable post colonoscopy today w/ heme onc outpt f/u appt early next week

## 2024-12-24 NOTE — DISCHARGE NOTE NURSING/CASE MANAGEMENT/SOCIAL WORK - PATIENT PORTAL LINK FT
You can access the FollowMyHealth Patient Portal offered by [facility] by registering at the following website: [email or web address]. By joining [facility]’s FollowMyHealth portal, you will also be able to view your health information using other applications (apps) compatible with our system.

## 2024-12-24 NOTE — PROGRESS NOTE ADULT - EYES
PERRL/EOMI/conjunctiva clear/non icteric sclera
PERRL/conjunctiva clear/non icteric sclera
PERRL/EOMI/conjunctiva clear/non icteric sclera
PERRL/EOMI/conjunctiva clear
PERRL/EOMI/conjunctiva clear/non icteric sclera
PERRL/EOMI/conjunctiva clear/non icteric sclera

## 2024-12-24 NOTE — PROGRESS NOTE ADULT - PROBLEM SELECTOR PLAN 3
-s/p transfusion 2 units  s/p omental biopsy 12/23 - MRI showed a large rectosigmoid colon mass, possibly colon cancer  -awaiting biopsy results  - for colonoscopy today  pt to follow up outpatient when biopsy results come back  discussed with dr. matias

## 2024-12-24 NOTE — PROGRESS NOTE ADULT - NUTRITIONAL ASSESSMENT
This patient has been assessed with a concern for Malnutrition and has been determined to have a diagnosis/diagnoses of Severe protein-calorie malnutrition.    This patient is being managed with:   Diet Regular-  Lacto Veg (Accepts Milk & Milk Products)  Supplement Feeding Modality:  Oral  Ensure Plus High Protein Cans or Servings Per Day:  1       Frequency:  Daily  Entered: Dec 20 2024  3:51PM  
This patient has been assessed with a concern for Malnutrition and has been determined to have a diagnosis/diagnoses of Severe protein-calorie malnutrition.    This patient is being managed with:   Diet Clear Liquid-  Entered: Dec 23 2024  4:29PM    Diet NPO after Midnight-     NPO Start Date: 23-Dec-2024   NPO Start Time: 23:59  Entered: Dec 23 2024  7:43AM  
This patient has been assessed with a concern for Malnutrition and has been determined to have a diagnosis/diagnoses of Severe protein-calorie malnutrition.    This patient is being managed with:   Diet NPO-  NPO for Procedure/Test     NPO Start Date: 22-Dec-2024   NPO Start Time: 23:59  Entered: Dec 21 2024  9:40PM    Diet Regular-  Lacto Veg (Accepts Milk & Milk Products)  Supplement Feeding Modality:  Oral  Ensure Plus High Protein Cans or Servings Per Day:  1       Frequency:  Daily  Entered: Dec 20 2024  3:51PM  
This patient has been assessed with a concern for Malnutrition and has been determined to have a diagnosis/diagnoses of Severe protein-calorie malnutrition.    This patient is being managed with:   Diet Regular-  Lacto Veg (Accepts Milk & Milk Products)  Supplement Feeding Modality:  Oral  Ensure Plus High Protein Cans or Servings Per Day:  1       Frequency:  Daily  Entered: Dec 24 2024 10:38AM  
This patient has been assessed with a concern for Malnutrition and has been determined to have a diagnosis/diagnoses of Severe protein-calorie malnutrition.    This patient is being managed with:   Diet NPO-  Entered: Dec 23 2024  9:30AM    Diet NPO after Midnight-     NPO Start Date: 23-Dec-2024   NPO Start Time: 23:59  Entered: Dec 23 2024  7:43AM    Diet NPO-  NPO for Procedure/Test     NPO Start Date: 22-Dec-2024   NPO Start Time: 23:59  Entered: Dec 21 2024  9:40PM  
This patient has been assessed with a concern for Malnutrition and has been determined to have a diagnosis/diagnoses of Severe protein-calorie malnutrition.    This patient is being managed with:   Diet NPO-  NPO for Procedure/Test     NPO Start Date: 22-Dec-2024   NPO Start Time: 23:59  Entered: Dec 21 2024  9:40PM    Diet Regular-  Lacto Veg (Accepts Milk & Milk Products)  Supplement Feeding Modality:  Oral  Ensure Plus High Protein Cans or Servings Per Day:  1       Frequency:  Daily  Entered: Dec 20 2024  3:51PM  
This patient has been assessed with a concern for Malnutrition and has been determined to have a diagnosis/diagnoses of Severe protein-calorie malnutrition.    This patient is being managed with:   Diet NPO-  Entered: Dec 23 2024  9:30AM    Diet NPO after Midnight-     NPO Start Date: 23-Dec-2024   NPO Start Time: 23:59  Entered: Dec 23 2024  7:43AM    Diet NPO-  NPO for Procedure/Test     NPO Start Date: 22-Dec-2024   NPO Start Time: 23:59  Entered: Dec 21 2024  9:40PM  
This patient has been assessed with a concern for Malnutrition and has been determined to have a diagnosis/diagnoses of Severe protein-calorie malnutrition.    This patient is being managed with:   Diet Regular-  Lacto Veg (Accepts Milk & Milk Products)  Supplement Feeding Modality:  Oral  Ensure Plus High Protein Cans or Servings Per Day:  1       Frequency:  Daily  Entered: Dec 20 2024  3:51PM  
This patient has been assessed with a concern for Malnutrition and has been determined to have a diagnosis/diagnoses of Severe protein-calorie malnutrition.    This patient is being managed with:   Diet NPO-  Entered: Dec 23 2024  9:30AM    Diet NPO after Midnight-     NPO Start Date: 23-Dec-2024   NPO Start Time: 23:59  Entered: Dec 23 2024  7:43AM    Diet NPO-  NPO for Procedure/Test     NPO Start Date: 22-Dec-2024   NPO Start Time: 23:59  Entered: Dec 21 2024  9:40PM

## 2024-12-24 NOTE — PROGRESS NOTE ADULT - SUBJECTIVE AND OBJECTIVE BOX
[   ] ICU                                          [   ] CCU                                      [  X ] Medical Floor    Patient is a 54 year old female with abdominal pain. GI consulted to evaluate.          54 year old female from home, with past medical history significant for asthma and Ulcerative Colitis presented to the emergency room with 2 weeks history of progressively worsening intermittent 8/10 intensity non radiating sharp lower abdominal pain associated with bloody watery diarrhea associated with nausea, non bloody vomiting, headache and loss of appetite.      Patient appears comfortable. No new complaints reported, No nausea, vomiting, hematemesis, hematochezia, melena, fever, chills, chest pain, SOB, cough or diarrhea reported.       Prior Colonoscopy:  2022      PAST MEDICAL HISTORY    Asthma    Ulcerative colitis        PAST SURGICAL HISTORY    No significant past surgical history reported        Allergies    No Known Allergies    Intolerances  None         SOCIAL HISTORY  Advanced Directives:       [ X ] Full Code       [  ] DNR  Marital Status:         [  ] M      [  ] S      [  ] D       [  ] W  Children:       [ X ] Yes      [  ] No  Occupation:        [  ] Employed       [X  ] Unemployed       [  ] Retired  Diet:       [ X ] Regular       [  ] PEG feeding          [  ] NG tube feeding  Drug Use:           [X  ] No          [  ] Yes  Alcohol:           [X  ] No             [  ] Yes (socially)         [  ] Yes (chronic)  Tobacco:           [  ] Yes           [ X ] No      FAMILY HISTORY  [ X ] Heart Disease            [ X ] Diabetes             [ X ] HTN             [  ] Colon Cancer             [  ] Stomach Cancer              [  ] Pancreatic Cancer        VITALS   Vital Signs Last 24 Hrs  T(C): 36.5 (12-24-24 @ 09:17), Max: 37.1 (12-24-24 @ 05:22)  T(F): 97.7 (12-24-24 @ 09:17), Max: 98.8 (12-24-24 @ 05:22)  HR: 65 (12-24-24 @ 10:15) (58 - 72)  BP: 114/59 (12-24-24 @ 10:15) (99/61 - 153/90)  BP(mean): 90 (12-23-24 @ 20:44) (89 - 100)  RR: 17 (12-24-24 @ 10:15) (16 - 24)  SpO2: 98% (12-24-24 @ 10:15) (94% - 100%)    MEDICATIONS  (STANDING):  albuterol/ipratropium for Nebulization 3 milliLiter(s) Nebulizer every 6 hours  montelukast 10 milliGRAM(s) Oral daily  pantoprazole    Tablet 40 milliGRAM(s) Oral before breakfast  polyethylene glycol/electrolyte Solution. 4000 milliLiter(s) Oral once  potassium chloride    Tablet ER 40 milliEquivalent(s) Oral every 4 hours    MEDICATIONS  (PRN):  acetaminophen     Tablet .. 650 milliGRAM(s) Oral every 6 hours PRN Temp greater or equal to 38C (100.4F), Mild Pain (1 - 3)  guaiFENesin Oral Liquid (Sugar-Free) 100 milliGRAM(s) Oral every 6 hours PRN Cough  ondansetron Injectable 4 milliGRAM(s) IV Push every 8 hours PRN Nausea and/or Vomiting                            8.7    11.27 )-----------( 284      ( 24 Dec 2024 05:21 )             29.1       12-24    142  |  106  |  19[H]  ----------------------------<  86  3.2[L]   |  29  |  0.54    Ca    8.7      24 Dec 2024 05:21  Phos  3.3     12-23  Mg     2.3     12-23    TPro  6.2  /  Alb  2.7[L]  /  TBili  0.3  /  DBili  x   /  AST  20  /  ALT  37  /  AlkPhos  52  12-24      PT/INR - ( 23 Dec 2024 08:00 )   PT: 10.6 sec;   INR: 0.91 ratio         PTT - ( 23 Dec 2024 08:00 )  PTT:26.2 sec

## 2024-12-24 NOTE — PROGRESS NOTE ADULT - NEGATIVE NEUROLOGICAL SYMPTOMS
no syncope/no tremors/no vertigo/no loss of sensation/no headache

## 2024-12-24 NOTE — PROGRESS NOTE ADULT - PROBLEM SELECTOR PLAN 1
s/p omental biopsy 12/23 - MRI showed a large rectosigmoid colon mass, possibly colon cancer  -awaiting biopsy results  - for colonoscopy today  pt to follow up outpatient when biopsy results come back - will see gyn onc if bx confirms GYN malignancy  discussed with dr matias

## 2024-12-24 NOTE — PROGRESS NOTE ADULT - PROBLEM SELECTOR PROBLEM 3
Symptomatic anemia
Mural thickening of sigmoid colon
Adnexal mass
Asthma
Mural thickening of sigmoid colon

## 2024-12-24 NOTE — PROGRESS NOTE ADULT - REASON FOR ADMISSION
Abdominal pain, rectal bleeding
abdominal pain, headache, nausea, vomiting and dizziness
symptomatic anemia and malignancy work up
abdominal pain
symptomatic anemia
symptomatic anemia and malignancy work up
Anemia
Symptomatic anemia and malignancy workup

## 2024-12-24 NOTE — PROGRESS NOTE ADULT - NS ATTEND AMEND GEN_ALL_CORE FT
Patient seen and evaluated with the PA team    53 y/o  LMP 2022  medical history significant for asthma and ulcerative colitis.  She is also positive for flu and in isolation at this time    Patient reported rectal staining/bleeding for 2 weeks and increasing lower abdominal pain in the last few days.  She was seen in the ER and admitted for pain control and for evaluation/work up after a pelvic/adnexal structure was noted.    Patient reported no fever.  She had emesis associated with her severe pain, but she has been without emesis for the last 2 days.  She reported a 10-12 pound weight loss in the last few weeks.  Her last gyn exam was 2 years ago and she could not remember the gyn office.  We will try to explore that from her PCP.    Her mammogram was two years ago  her colonoscopy was 2 years ago  and gyn exam two years ago.      She reported no family history of gyn cancer    she is on pentaprozole 40mg    Patient received transfusion during her hospitalization  This is now hospital day 4     VSS  Her exam  no adenopathy palpated  Breasts soft no mass palpated not tender  abdomen soft minimal guarding not tender no rebound not distended  back no CVAT  extremities not tender  pelvic exam  cervix grossly normal  uterus mobile, normal size anteverted  right adnexal structure palpable, irregular in contour, some mobility not tender   left adnexa normal  RV as above  she was not comfortable with rectal exam    Her ,  and CEA were all elevated.    CT scan read as some ascites, evidence of carcinomatosis, and a right adnexal structure   MRI however stated that this mass appeared to be connected to the rectum/sigmoid colon    Assessment/Plan  Patient with symptoms of increasing abdominal pain, rectal bleeding and weight loss.  Images indicated a clinical picture of adnexal mass, carcinomatosis mild ascites  tumor markers were all elevated including ca125, ca19-9 and CEA.  patient reported a ashwini colonoscopy two years ago    The differential diagnosis at this time include a mullerian cancer probably ovarian in origin, or a colon cancer.  Given her rectal bleeding, I would recommend a colonoscopy for possible biopsy.    If tumor is present in the mucosa of colon, it does not automatically suggests primary colon cancer as the disease appeared to be mostly adnexal .  However special staining will provide us with more helpful information.    If there is no mucosal involvement or if the IHC point us towards a gyn primary, I would recommend a primary debulking procedure given the extent of her disease.     Will discuss that with patient further once we have more information.  Will follow.    50 min of encounter with over 50% of face to face encounter.
Patient seen and evaluated with the PA team  She was getting prepped for the colonoscopy this am.  She feels fine and was finishing up with her prep.  Patient with a pelvic mass ascites and carcinomatosis, concerning for GYN or GI malignancy    Colonoscopy was completed at the time of this addendum  reported stated that there was a stricture at 20cm from the anus.  Scope could not pass through this area despite switching to a pediatric tube.    Biopsies were performed  Will assess status.  Stricture may indicate need for an urgent procedure in this patient who does not have evidence of blockage at this time.  Will follow for now.  check pathology

## 2024-12-24 NOTE — PROGRESS NOTE ADULT - TONSILS
no redness/no swelling
no redness/no swelling/no discharge
no redness/no swelling/no discharge
no redness/no swelling
no redness/no swelling/no discharge
no redness/no swelling/no discharge

## 2024-12-24 NOTE — PROGRESS NOTE ADULT - SUBJECTIVE AND OBJECTIVE BOX
GYN ONC PA PROGRESS NOTE    Patient seen at bedside with Dr. Harris  Resting comfortably offers no new complaints.  s/p omental node bx 12/23, awaiting results  + Ambulation, voiding without difficulty, + flatus; no bm; tolerating regular diet.  Denies CP, SOB, N/V/D, dizziness, palpitations, vaginal bleeding.     Vital Signs Last 24 Hrs  T(C): 36.5 (24 Dec 2024 09:17), Max: 37.1 (24 Dec 2024 05:22)  T(F): 97.7 (24 Dec 2024 09:17), Max: 98.8 (24 Dec 2024 05:22)  HR: 65 (24 Dec 2024 10:15) (58 - 72)  BP: 114/59 (24 Dec 2024 10:15) (99/61 - 153/90)  BP(mean): 90 (23 Dec 2024 20:44) (89 - 100)  RR: 17 (24 Dec 2024 10:15) (16 - 24)  SpO2: 98% (24 Dec 2024 10:15) (94% - 100%)    Parameters below as of 24 Dec 2024 10:15  Patient On (Oxygen Delivery Method): room air      Gen: A&O x 3, NAD  Chest: CTA B/L  Cardiac: S1,S2  RRR  Abdomen: +BS; soft; Nontender, nondistended, no rigidity or guarding, surgical site clean and dry  Gyn: no vaginal bleeding   Extremities: Nontender, no worsening edema                          8.7    11.27 )-----------( 284      ( 24 Dec 2024 05:21 )             29.1     12-24    142  |  106  |  19[H]  ----------------------------<  86  3.2[L]   |  29  |  0.54    Ca    8.7      24 Dec 2024 05:21  Phos  3.3     12-23  Mg     2.3     12-23    TPro  6.2  /  Alb  2.7[L]  /  TBili  0.3  /  DBili  x   /  AST  20  /  ALT  37  /  AlkPhos  52  12-24    PROCEDURE DATE:  12/17/2024          INTERPRETATION:  CLINICAL INFORMATION: 54 years  Female with lower   abdominal pain, diarrhea/ r/o colitis.    COMPARISON: CT abdomen and pelvis 6/27/2021    CONTRAST/COMPLICATIONS:  IV Contrast: Omnipaque 350  90 cc administered   10 cc discarded  Oral Contrast: NONE  .    PROCEDURE:  CT of the Abdomen and Pelvis was performed.  Sagittal and coronal reformats were performed.    FINDINGS:  LOWER CHEST: Stable 1 cm right middle lobe bulla.    LIVER: Within normal limits.  BILE DUCTS: Normal caliber.  GALLBLADDER: Within normal limits.  SPLEEN: Within normal limits.  PANCREAS: Within normal limits.  ADRENALS: Within normal limits.  KIDNEYS/URETERS: Within normal limits.    BLADDER: Within normal limits.  REPRODUCTIVE ORGANS: Unremarkable uterus. 7.0 x 2.6 x 3.2 cm tubular   collection in the right adnexa.    BOWEL: No bowel obstruction. Appendix  . Irregular nonspecific thickening   of the sigmoid colon.  PERITONEUM/RETROPERITONEUM: Small ascites is new. Extensive nodular soft   tissue infiltration throughout the omentum consistent with carcinomatosis.  VESSELS: Within normal limits.  LYMPH NODES: No lymphadenopathy.  ABDOMINAL WALL: Within normal limits.  BONES: Within normal limits.    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    7.0 x 2.6 x 3.2 cm cystic right adnexal lesion, possibly ovarian or   tubular malignancy. Recommend pelvic MRI.    Irregular thickening of the sigmoid colon also concerning for malignancy.   Recommend colonoscopy.        --- End of Report ---

## 2024-12-24 NOTE — PROGRESS NOTE ADULT - ASSESSMENT
1. Abdominal pain  2. Bloody diarrhea stopped  3. Iron deficiency anemia  4. Ulcerative colitis   5. Ascites  6. S/p MRI  7. Peritoneal carcinomatosis  8. Transmural rectosigmoid tumor involve  9. R/o ovarian cancer  10. Elevated , Ca125 and CEA  11. S/p Colonoscopy  12. Rectosigmoid mass    Suggestions:    1. Monitor H/H  2. Transfuse PRBC as needed  3. IR scheduled for omental biopsy   4. Protonix 40mg daily  5. Follow up path  6. GYN Follow up  7. Diet as tolerated   8. Avoid NSAID  9. Iron supplement  10. DVT prophylaxis

## 2024-12-24 NOTE — PROGRESS NOTE ADULT - NEGATIVE PSYCHIATRIC SYMPTOMS
no suicidal ideation/no depression/no anxiety/no memory loss/no paranoia/no mood swings/no agitation

## 2024-12-24 NOTE — PROGRESS NOTE ADULT - RESPIRATORY
clear to auscultation bilaterally/no wheezes/no rales/no rhonchi/no respiratory distress

## 2024-12-24 NOTE — DISCHARGE NOTE NURSING/CASE MANAGEMENT/SOCIAL WORK - FINANCIAL ASSISTANCE
[facility] provides services at a reduced cost to those who are determined to be eligible through [facility]’s financial assistance program. Information regarding [facility]’s financial assistance program can be found by going to [email or web address] or by calling [phone].

## 2024-12-24 NOTE — PROGRESS NOTE ADULT - NEGATIVE CARDIOVASCULAR SYMPTOMS
no chest pain/no palpitations/no orthopnea/no peripheral edema

## 2024-12-24 NOTE — PROGRESS NOTE ADULT - GIT GEN HX ROS MEA POS PC
nausea/vomiting/diarrhea/abdominal pain/hematochezia

## 2024-12-24 NOTE — PROGRESS NOTE ADULT - PROBLEM SELECTOR PROBLEM 1
Symptomatic anemia
Symptomatic anemia
Carcinomatosis
Symptomatic anemia
Symptomatic anemia
Adnexal mass
Symptomatic anemia

## 2024-12-24 NOTE — PROGRESS NOTE ADULT - PROBLEM SELECTOR PLAN 2
-s/p omental biopsy 12/23 - MRI showed a large rectosigmoid colon mass, possibly colon cancer  -awaiting biopsy results  - for colonoscopy today  pt to follow up outpatient when biopsy results come back  discussed with dr matias

## 2024-12-24 NOTE — PROGRESS NOTE ADULT - NEGATIVE ENDOCRINE SYMPTOMS
no cold intolerance/no heat intolerance/no striae/no voice change/no hirsutism

## 2024-12-24 NOTE — PROGRESS NOTE ADULT - SUBJECTIVE AND OBJECTIVE BOX
Patient is a 54y old  Female who presents with a chief complaint of Symptomatic anemia and malignancy workup (23 Dec 2024 10:52)    Subjective: Patient seen at bedside.  Patient is feeling well overall. No overnight events.    MEDICATIONS  (STANDING):  albuterol/ipratropium for Nebulization 3 milliLiter(s) Nebulizer every 6 hours  montelukast 10 milliGRAM(s) Oral daily  pantoprazole    Tablet 40 milliGRAM(s) Oral before breakfast  polyethylene glycol/electrolyte Solution. 4000 milliLiter(s) Oral once  potassium chloride  10 mEq/100 mL IVPB 10 milliEquivalent(s) IV Intermittent every 1 hour    MEDICATIONS  (PRN):  acetaminophen     Tablet .. 650 milliGRAM(s) Oral every 6 hours PRN Temp greater or equal to 38C (100.4F), Mild Pain (1 - 3)  guaiFENesin Oral Liquid (Sugar-Free) 100 milliGRAM(s) Oral every 6 hours PRN Cough  ondansetron Injectable 4 milliGRAM(s) IV Push every 8 hours PRN Nausea and/or Vomiting      ROS  No fever, sweats, chills  No epistaxis, HA, sore throat  No CP, SOB, cough, sputum  No n/v/d, abd pain, melena, hematochezia  No edema  No rash  No anxiety  No back pain, joint pain  No bleeding, bruising  No dysuria, hematuria    Vital Signs Last 24 Hrs  T(C): 36.5 (24 Dec 2024 09:17), Max: 37.1 (24 Dec 2024 05:22)  T(F): 97.7 (24 Dec 2024 09:17), Max: 98.8 (24 Dec 2024 05:22)  HR: 64 (24 Dec 2024 09:17) (58 - 72)  BP: 128/74 (24 Dec 2024 09:17) (121/88 - 153/90)  BP(mean): 90 (23 Dec 2024 20:44) (89 - 100)  RR: 24 (24 Dec 2024 09:17) (16 - 24)  SpO2: 96% (24 Dec 2024 09:17) (94% - 98%)    Parameters below as of 24 Dec 2024 09:17  Patient On (Oxygen Delivery Method): room air        PE  NAD  Awake, alert  Anicteric, MMM  RRR  CTAB  Abd soft, NT, ND  No c/c/e  No rash grossly  FROM                          8.7    11.27 )-----------( 284      ( 24 Dec 2024 05:21 )             29.1       12-24    142  |  106  |  19[H]  ----------------------------<  86  3.2[L]   |  29  |  0.54    Ca    8.7      24 Dec 2024 05:21  Phos  3.3     12-23  Mg     2.3     12-23    TPro  6.2  /  Alb  2.7[L]  /  TBili  0.3  /  DBili  x   /  AST  20  /  ALT  37  /  AlkPhos  52  12-24

## 2024-12-24 NOTE — PROGRESS NOTE ADULT - PROVIDER SPECIALTY LIST ADULT
Gastroenterology
Heme/Onc
Internal Medicine
Pulmonology
Surgery
Internal Medicine
Internal Medicine
Intervent Radiology
Gyn Onc
Gastroenterology
Gyn Onc
Gyn Onc
Internal Medicine
Gastroenterology
Gastroenterology
Internal Medicine
Gastroenterology
Internal Medicine
Gastroenterology
Internal Medicine

## 2024-12-24 NOTE — PROGRESS NOTE ADULT - ASSESSMENT
· Assessment	  Ms Jha is a pleasant 54 year old female with history of UC, admitted for abdominal pain and influenza B, found to have multiple omental carcinomatosis, right adnexal mass and sigmoid colon thickening    1. Omental carcinomatosis  MRI showed a large rectosigmoid colon mass  probably colon ca  there are fluids around pelvis and ovary  for omental biopsy today and colonoscopy tomorrow  she is known to have UC for years.  last colonoscopy 2022    2. Anemia  ·	Hb improved from 5.3 to 9.1 from PRBC transfusions.  ·	possibly associated with GI bleed from the UC and sigmoid colon thickening  ·	follow GI recs, colonoscopy due today 12/24  ·	daily cbc  ·	transfusion for hb<7  ·	ferritin 8, pls give pt venofer 200mg daily until d/c, can cont in office    Patient to follow closely as outpatient to go over results and to discuss the next steps in management.

## 2024-12-24 NOTE — PROGRESS NOTE ADULT - NECK
supple/symmetric/no tracheal deviation
supple/symmetric
supple/symmetric/no tracheal deviation

## 2025-01-02 LAB — SURGICAL PATHOLOGY STUDY: SIGNIFICANT CHANGE UP

## (undated) DEVICE — DRSG 2X2

## (undated) DEVICE — SOLIDIFIER ISOLYZER 2000CC

## (undated) DEVICE — ELCTR GROUNDING PAD ADULT COVIDIEN

## (undated) DEVICE — Device

## (undated) DEVICE — SALIVA EJECTOR (BLUE)

## (undated) DEVICE — LUBRICATING JELLY HR ONE SHOT 3G

## (undated) DEVICE — ELCTR ECG CONDUCTIVE ADHESIVE

## (undated) DEVICE — CONTAINER FORMALIN 10% 20ML

## (undated) DEVICE — MASK O2 MICROSTREAM SAMPLE LINE MED/LRG 10FT

## (undated) DEVICE — TUBING SUCTION NONCONDUCTIVE 6MM X 12FT

## (undated) DEVICE — BASIN EMESIS 10IN GRADUATED MAUVE

## (undated) DEVICE — BIOPSY FORCEP COLD DISP

## (undated) DEVICE — DRSG BANDAID 0.75X3"

## (undated) DEVICE — CATH IV SAFE BC 22G X 1" (BLUE)

## (undated) DEVICE — RADIAL JAW 4 LG CAPACITY WITH NDL

## (undated) DEVICE — VALVE ENDO SURESEAL II 0-5FR

## (undated) DEVICE — TUBING IV SET GRAVITY 3Y 100" MACRO

## (undated) DEVICE — FORMALIN CUPS 10% BUFFERED

## (undated) DEVICE — DRSG CURITY GAUZE SPONGE 4 X 4" 12-PLY NON-STERILE

## (undated) DEVICE — TUBING MEDI-VAC W MAXIGRIP CONNECTORS 1/4"X6'

## (undated) DEVICE — BIOPSY FORCEP RADIAL JAW 4 STANDARD WITH NEEDLE

## (undated) DEVICE — PACK IV START WITH CHG

## (undated) DEVICE — GOWN LG